# Patient Record
Sex: FEMALE | Race: WHITE | NOT HISPANIC OR LATINO | Employment: FULL TIME | URBAN - NONMETROPOLITAN AREA
[De-identification: names, ages, dates, MRNs, and addresses within clinical notes are randomized per-mention and may not be internally consistent; named-entity substitution may affect disease eponyms.]

---
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
## 2019-11-21 ENCOUNTER — APPOINTMENT (OUTPATIENT)
Dept: URGENT CARE | Age: 19
End: 2019-11-21

## 2019-11-21 ENCOUNTER — APPOINTMENT (OUTPATIENT)
Dept: LAB | Age: 19
End: 2019-11-21

## 2019-11-21 ENCOUNTER — TRANSCRIBE ORDERS (OUTPATIENT)
Dept: URGENT CARE | Age: 19
End: 2019-11-21

## 2019-11-21 DIAGNOSIS — Z02.1 PRE-EMPLOYMENT EXAMINATION: ICD-10-CM

## 2019-11-21 DIAGNOSIS — Z02.1 PRE-EMPLOYMENT EXAMINATION: Primary | ICD-10-CM

## 2019-11-21 LAB — RUBV IGG SERPL IA-ACNC: >175 IU/ML

## 2019-11-21 PROCEDURE — 86762 RUBELLA ANTIBODY: CPT

## 2019-11-21 PROCEDURE — 86765 RUBEOLA ANTIBODY: CPT

## 2019-11-21 PROCEDURE — 86480 TB TEST CELL IMMUN MEASURE: CPT

## 2019-11-21 PROCEDURE — 86787 VARICELLA-ZOSTER ANTIBODY: CPT

## 2019-11-21 PROCEDURE — 36415 COLL VENOUS BLD VENIPUNCTURE: CPT

## 2019-11-21 PROCEDURE — 86735 MUMPS ANTIBODY: CPT

## 2019-11-22 LAB
GAMMA INTERFERON BACKGROUND BLD IA-ACNC: 0.02 IU/ML
M TB IFN-G BLD-IMP: NEGATIVE
M TB IFN-G CD4+ BCKGRND COR BLD-ACNC: 0 IU/ML
M TB IFN-G CD4+ BCKGRND COR BLD-ACNC: 0 IU/ML
MITOGEN IGNF BCKGRD COR BLD-ACNC: >10 IU/ML

## 2019-11-25 LAB — VZV IGG SER IA-ACNC: NORMAL

## 2019-11-26 LAB
MEV IGG SER QL: NORMAL
MUV IGG SER QL: NORMAL

## 2020-02-18 ENCOUNTER — TELEPHONE (OUTPATIENT)
Dept: OTHER | Facility: OTHER | Age: 20
End: 2020-02-18

## 2020-03-04 ENCOUNTER — OFFICE VISIT (OUTPATIENT)
Dept: URGENT CARE | Facility: CLINIC | Age: 20
End: 2020-03-04
Payer: COMMERCIAL

## 2020-03-04 VITALS
TEMPERATURE: 98.6 F | OXYGEN SATURATION: 97 % | RESPIRATION RATE: 18 BRPM | SYSTOLIC BLOOD PRESSURE: 110 MMHG | HEART RATE: 84 BPM | DIASTOLIC BLOOD PRESSURE: 64 MMHG

## 2020-03-04 DIAGNOSIS — A09 GASTROENTERITIS, INFECTIOUS: Primary | ICD-10-CM

## 2020-03-04 PROCEDURE — G0382 LEV 3 HOSP TYPE B ED VISIT: HCPCS | Performed by: PHYSICIAN ASSISTANT

## 2020-03-04 RX ORDER — ONDANSETRON 4 MG/1
4 TABLET, ORALLY DISINTEGRATING ORAL EVERY 6 HOURS PRN
Qty: 20 TABLET | Refills: 0 | Status: SHIPPED | OUTPATIENT
Start: 2020-03-04 | End: 2020-04-18 | Stop reason: ALTCHOICE

## 2020-03-04 NOTE — LETTER
March 4, 2020     Patient: Linda Edmondson   YOB: 2000   Date of Visit: 3/4/2020       To Whom It May Concern:    Above patient seen in office today for acute medical ailment  May  attempt return to work in next 1-3 days as tolerated       Sincerely,        Lin St PA-C    CC: No Recipients

## 2020-03-04 NOTE — PATIENT INSTRUCTIONS
1  Clear liquids for 12-24 hours     2  Then may advance to bland diet (ie  BRAT - bananas, applesauce, toast) as tolerated  3  Recommend avoiding any milk products, spicy, greasy foods and citrus products over the next 1-2 weeks  Advance diet as tolerated  4  Transmission precautions advised  5  If symptoms are not resolving over the next 2-3 days, seek further evaluation by your PCP  6  If you symptoms worsen and you are unable to keep any food or liquid down or develop significant abdominal pain, proceed to ER for further evaluation and treatment

## 2020-03-04 NOTE — PROGRESS NOTES
[facility]    NAME: Ladi Huang is a 22 y o  female  : 2000    MRN: [id number]  DATE: 2020  TIME: 1:53 PM    Assessment and Plan   Gastroenteritis, infectious [A09]  1  Gastroenteritis, infectious  ondansetron (ZOFRAN-ODT) 4 mg disintegrating tablet     Work note given  Patient Instructions   Patient Instructions   1  Clear liquids for 12-24 hours     2  Then may advance to bland diet (ie  BRAT - bananas, applesauce, toast) as tolerated  3  Recommend avoiding any milk products, spicy, greasy foods and citrus products over the next 1-2 weeks  Advance diet as tolerated  4  Transmission precautions advised  5  If symptoms are not resolving over the next 2-3 days, seek further evaluation by your PCP  6  If you symptoms worsen and you are unable to keep any food or liquid down or develop significant abdominal pain, proceed to ER for further evaluation and treatment  Chief Complaint     Chief Complaint   Patient presents with    Vomiting     Pt states since  fever, n/v/d and body aches  History of Present Illness   Ladi Huang presents to the clinic c/o  47-year-old female here with vomiting  Started feeling tired and achy on Saturday night  Then on  morning started with the vomiting, diarrhea and nausea with increased belching and some epigastric discomfort  She has also felt feverish  Last emesis was early yesterday  She has been able to keep some liquids and food in  She says she is very sensitive to holders and does have decreased appetite  T-max 100 2°  No recent travel  Works at another 1 of the [employer] offices  No recent antibiotics  Tried an over-the-counter anti nausea medicine without much relief  She was sent home from work on  and was off on Monday  She tried to go back in yesterday and remained most of the day but did lead a few minutes earlier than normal due to acute illness    She does work tomorrow and Friday and thinks that she can go back  Review of Systems   Review of Systems   Constitutional: Positive for activity change, appetite change, fatigue and fever  Respiratory: Negative  Cardiovascular: Negative  Gastrointestinal: Positive for diarrhea, nausea and vomiting  Negative for abdominal distention, abdominal pain, anal bleeding, blood in stool, constipation and rectal pain  Gas, belching  Musculoskeletal: Positive for myalgias  Skin: Negative for rash  Hematological: Negative  Current Medications     Long-Term Medications   Medication Sig Dispense Refill    ondansetron (ZOFRAN-ODT) 4 mg disintegrating tablet Take 1 tablet (4 mg total) by mouth every 6 (six) hours as needed for nausea or vomiting 20 tablet 0       Current Allergies     Allergies as of 03/04/2020 - Reviewed 03/04/2020   Allergen Reaction Noted    Benadryl [diphenhydramine] Hives 04/19/2016          The following portions of the patient's history were reviewed and updated as appropriate: allergies, current medications, past family history, past medical history, past social history, past surgical history and problem list   Past Medical History:   Diagnosis Date    Psychiatric disorder     depression     Past Surgical History:   Procedure Laterality Date    SKIN GRAFT      SKIN GRAFT      left elbow     No family history on file  Objective   /64   Pulse 84   Temp 98 6 °F (37 °C) (Tympanic Core)   Resp 18   LMP 03/01/2020   SpO2 97%   Patient's last menstrual period was 03/01/2020  Physical Exam     Physical Exam   Constitutional: She is oriented to person, place, and time  She appears well-developed and well-nourished  No distress  HENT:   Head: Normocephalic and atraumatic  Mouth/Throat: Oropharynx is clear and moist  No oropharyngeal exudate  Eyes: Pupils are equal, round, and reactive to light  Conjunctivae and EOM are normal  No scleral icterus     Neck: Normal range of motion  Neck supple  Cardiovascular: Normal rate, regular rhythm and normal heart sounds  Exam reveals no gallop and no friction rub  No murmur heard  Pulmonary/Chest: Effort normal and breath sounds normal  No stridor  No respiratory distress  She has no wheezes  She has no rales  Abdominal: Soft  She exhibits no distension and no mass  There is no tenderness  There is no guarding  Neurological: She is alert and oriented to person, place, and time  Skin: Skin is warm and dry  No rash noted  She is not diaphoretic  Good turgor   Psychiatric: She has a normal mood and affect  Nursing note and vitals reviewed

## 2020-03-09 ENCOUNTER — OFFICE VISIT (OUTPATIENT)
Dept: OBGYN CLINIC | Facility: MEDICAL CENTER | Age: 20
End: 2020-03-09
Payer: COMMERCIAL

## 2020-03-09 VITALS
HEIGHT: 66 IN | DIASTOLIC BLOOD PRESSURE: 70 MMHG | BODY MASS INDEX: 26.84 KG/M2 | SYSTOLIC BLOOD PRESSURE: 110 MMHG | WEIGHT: 167 LBS

## 2020-03-09 DIAGNOSIS — Z30.09 ENCOUNTER FOR COUNSELING REGARDING CONTRACEPTION: Primary | ICD-10-CM

## 2020-03-09 PROCEDURE — 99203 OFFICE O/P NEW LOW 30 MIN: CPT | Performed by: OBSTETRICS & GYNECOLOGY

## 2020-03-10 ENCOUNTER — TELEPHONE (OUTPATIENT)
Dept: OBGYN CLINIC | Facility: MEDICAL CENTER | Age: 20
End: 2020-03-10

## 2020-03-10 NOTE — TELEPHONE ENCOUNTER
Per Helga Sky at [employer] Information is active  Nexplanon insertion, removal and device will be covered at 100%  No precert required  No patient responsibility  Reference # [id number]  Pt aware and appt scheduled  ----- Message from Dotty Dinh MD sent at 3/9/2020 11:12 AM EDT -----  Regarding: schedule nexplanon  Please check patient's insurance and schedule with me for Nexplanon placement

## 2020-03-21 ENCOUNTER — AMB VIDEO VISIT (OUTPATIENT)
Dept: URGENT CARE | Facility: CLINIC | Age: 20
End: 2020-03-21

## 2020-03-21 ENCOUNTER — OFFICE VISIT (OUTPATIENT)
Dept: URGENT CARE | Facility: CLINIC | Age: 20
End: 2020-03-21
Payer: COMMERCIAL

## 2020-03-21 VITALS — OXYGEN SATURATION: 95 % | TEMPERATURE: 99.7 F | RESPIRATION RATE: 16 BRPM | HEART RATE: 70 BPM

## 2020-03-21 DIAGNOSIS — R05.9 COUGH: Primary | ICD-10-CM

## 2020-03-21 DIAGNOSIS — R50.81 FEVER IN OTHER DISEASES: Primary | ICD-10-CM

## 2020-03-21 PROCEDURE — G0382 LEV 3 HOSP TYPE B ED VISIT: HCPCS | Performed by: NURSE PRACTITIONER

## 2020-03-21 PROCEDURE — EVISIT: Performed by: PHYSICIAN ASSISTANT

## 2020-03-21 NOTE — PATIENT INSTRUCTIONS
[address]    Your healthcare provider and/or public health staff have evaluated you and have determined that you do not need to remain in the hospital at this time  At this time you can be isolated at home where you will be monitored by staff from your local or state health department  You should carefully follow the prevention and isolation steps below until a healthcare provider or local or state health department says that you can return to your normal activities  Stay home except to get medical care    People who are mildly ill with COVID-19 are able to isolate at home during their illness  You should restrict activities outside your home, except for getting medical care  Do not go to work, school, or public areas  Avoid using public transportation, ride-sharing, or taxis  Separate yourself from other people and animals in your home    People: As much as possible, you should stay in a specific room and away from other people in your home  Also, you should use a separate bathroom, if available  Animals: You should restrict contact with pets and other animals while you are sick with COVID-19, just like you would around other people  Although there have not been reports of pets or other animals becoming sick with COVID-19, it is still recommended that people sick with COVID-19 limit contact with animals until more information is known about the virus  When possible, have another member of your household care for your animals while you are sick  If you are sick with COVID-19, avoid contact with your pet, including petting, snuggling, being kissed or licked, and sharing food  If you must care for your pet or be around animals while you are sick, wash your hands before and after you interact with pets and wear a facemask  See COVID-19 and Animals for more information      Call ahead before visiting your doctor    If you have a medical appointment, call the healthcare provider and tell them that you have or may have COVID-19  This will help the healthcare providers office take steps to keep other people from getting infected or exposed  Wear a facemask    You should wear a facemask when you are around other people (e g , sharing a room or vehicle) or pets and before you enter a healthcare providers office  If you are not able to wear a facemask (for example, because it causes trouble breathing), then people who live with you should not stay in the same room with you, or they should wear a facemask if they enter your room  Cover your coughs and sneezes    Cover your mouth and nose with a tissue when you cough or sneeze  Throw used tissues in a lined trash can  Immediately wash your hands with soap and water for at least 20 seconds or, if soap and water are not available, clean your hands with an alcohol-based hand  that contains at least 60% alcohol  Clean your hands often    Wash your hands often with soap and water for at least 20 seconds, especially after blowing your nose, coughing, or sneezing; going to the bathroom; and before eating or preparing food  If soap and water are not readily available, use an alcohol-based hand  with at least 60% alcohol, covering all surfaces of your hands and rubbing them together until they feel dry  Soap and water are the best option if hands are visibly dirty  Avoid touching your eyes, nose, and mouth with unwashed hands  Avoid sharing personal household items    You should not share dishes, drinking glasses, cups, eating utensils, towels, or bedding with other people or pets in your home  After using these items, they should be washed thoroughly with soap and water  Clean all high-touch surfaces everyday    High touch surfaces include counters, tabletops, doorknobs, bathroom fixtures, toilets, phones, keyboards, tablets, and bedside tables  Also, clean any surfaces that may have blood, stool, or body fluids on them   Use a household cleaning spray or wipe, according to the label instructions  Labels contain instructions for safe and effective use of the cleaning product including precautions you should take when applying the product, such as wearing gloves and making sure you have good ventilation during use of the product  Monitor your symptoms    Seek prompt medical attention if your illness is worsening (e g , difficulty breathing)  Before seeking care, call your healthcare provider and tell them that you have, or are being evaluated for, COVID-19  Put on a facemask before you enter the facility  These steps will help the healthcare providers office to keep other people in the office or waiting room from getting infected or exposed  Ask your healthcare provider to call the local or state health department  Persons who are placed under active monitoring or facilitated self-monitoring should follow instructions provided by their local health department or occupational health professionals, as appropriate  If you have a medical emergency and need to call 911, notify the dispatch personnel that you have, or are being evaluated for COVID-19  If possible, put on a facemask before emergency medical services arrive  Discontinuing home isolation    Patients with confirmed COVID-19 should remain under home isolation precautions until the risk of secondary transmission to others is thought to be low  The decision to discontinue home isolation precautions should be made on a case-by-case basis, in consultation with healthcare providers and state and local health departments      Source: [employer]

## 2020-03-21 NOTE — CARE ANYWHERE EVISITS
Visit Summary for Whitney Cogan - Gender: Female - Date of Birth: [birth year]  Date: [id number] - Duration: 5 minutes  Patient: Whitney Cogan  Provider: Osmar Locke PA-C    Patient Contact Information  Address  [address] [place] [address]; [address] [place] [address]  [phone]    Visit Topics  cough + fever: other [Added By: Self - 2020-03-21]    Triage Questions   Have you had any international travel in the last 14 days? Answer [no]  Have you had any exposure to a known or expected Covid-19 patient in the last 14 days? Answer [yes]  Do you have any immune system compromise or chronic lung disease? Answer [no]  Do you have any vulnerable family members in the home (infant, pregnant, cancer, elderly)? Answer Albino Morales, 9 month old son]     Conversation Transcripts  [0A][0A] [Notification] You are connected with Osmar Locke PA-C, Urgent Care Specialist [0A][Notification] Whitney Cogan is located in [place]  [0A][Notification] Whitney Cogan has shared health history  Quincy Mayen  [0A]    Diagnosis    Procedures  Value: [id number] Code: CPT-4 UNLISTED E&M SERVICE    Medications Prescribed    No prescriptions ordered    Electronically signed by: Sunny Pierce(NPI [id number])

## 2020-03-21 NOTE — PROGRESS NOTES
Video Visit - Cruzito Artis 22 y o  female MRN: [id number]    REQUIRED DOCUMENTATION:       There were no vitals filed for this visit  1  This service was provided via [facility]  2  Provider located at [place] [address]  [address] [place] [address] [address] [facility]  [phone]  3  [facility] provider: Ana M Rod PA-C   4  Identify all parties in room with patient during [facility] visit:  [facility] office door was closed  5  After connecting through [employer], patient was identified by name and date of birth  Patient was then informed that this was a Telemedicine visit and that the exam was being conducted confidentially over secure lines  No one else was in the room  Patient acknowledged consent and understanding of privacy and security of the Telemedicine visit  I informed the patient that I have reviewed their record in Epic and presented the opportunity for them to ask any questions regarding the visit today  The patient agreed to participate  68-year-old female use the video visit system to complain of cough chest tightness shortness of breath fever 100 1 for last 2 days  She works in healthcare  No known exposures or travel  No nausea vomiting  She is taking Tylenol cold and flu over-the-counter  She denies a history of asthma or medical problems  She is not diabetic  No kidney disease  Physical Exam   Constitutional: She is oriented to person, place, and time  She appears well-developed and well-nourished  No distress  Pulmonary/Chest: Effort normal      Dry cough during the visit   Neurological: She is alert and oriented to person, place, and time  Diagnoses and all orders for this visit:    Cough      Patient Instructions    Due to her cough fever shortness of breath and recent exposure I recommend follow-up with the care now for evaluation and possible coated testing  I called and spoke with the provider in the care now setting    Because the patient is also an employee they will contact in [employer]  Follow up with PCP if not improved, if symptoms are worse, go to the ER

## 2020-03-21 NOTE — PROGRESS NOTES
[facility] Now        NAME: Maria Eugenia Phelps is a 22 y o  female  : 2000    MRN: [id number]  DATE: 2020  TIME: 1:32 PM    Assessment and Plan   Fever in other diseases [R50 81]  1  Fever in other diseases  MISC COVID-19 TEST- Office Collection     Covid/rp2 testing completed  Self quarantine instructions given until results back - further information to be provided at that time  Pt in agreement with plan of care    Patient Instructions     Follow up with PCP in 3-5 days  Proceed to  ER if symptoms worsen  Chief Complaint     Chief Complaint   Patient presents with    Cough         History of Present Illness   Maria Eugenia Phelps presents to the clinic c/o    Pt did evisit - was told to present to office for COVID/flu testing  H/o low grade temp, body aches, cough  Works in urgent care  Unknown any direct contact/exposures   Has a small child at home  Did have flu vaccine  Review of Systems   Review of Systems   All other systems reviewed and are negative  Current Medications     Long-Term Medications   Medication Sig Dispense Refill    ondansetron (ZOFRAN-ODT) 4 mg disintegrating tablet Take 1 tablet (4 mg total) by mouth every 6 (six) hours as needed for nausea or vomiting (Patient not taking: Reported on 3/9/2020) 20 tablet 0       Current Allergies     Allergies as of 2020 - Reviewed 2020   Allergen Reaction Noted    Benadryl [diphenhydramine] Hives 2016            The following portions of the patient's history were reviewed and updated as appropriate: allergies, current medications, past family history, past medical history, past social history, past surgical history and problem list     Objective   Pulse 70   Temp 99 7 °F (37 6 °C)   Resp 16   LMP 2020   SpO2 95%        Physical Exam     Physical Exam   Constitutional: She is oriented to person, place, and time  Vital signs are normal  She appears well-developed and well-nourished  She is active and cooperative  She appears ill  HENT:   Head: Normocephalic and atraumatic  Eyes: Conjunctivae and lids are normal    Cardiovascular: Normal rate, regular rhythm, S1 normal, S2 normal and normal heart sounds  Pulmonary/Chest: Effort normal and breath sounds normal    Neurological: She is alert and oriented to person, place, and time  Skin: Skin is warm and dry  Psychiatric: She has a normal mood and affect  Her speech is normal and behavior is normal  Judgment and thought content normal  Cognition and memory are normal    Nursing note and vitals reviewed

## 2020-03-21 NOTE — PATIENT INSTRUCTIONS
Due to her cough fever shortness of breath and recent exposure I recommend follow-up with the care now for evaluation and possible coated testing  I called and spoke with the provider in the care now setting  Because the patient is also an employee they will contact in [employer]

## 2020-03-22 ENCOUNTER — TELEPHONE (OUTPATIENT)
Dept: URGENT CARE | Facility: CLINIC | Age: 20
End: 2020-03-22

## 2020-03-22 ENCOUNTER — DOCUMENTATION (OUTPATIENT)
Dept: URGENT CARE | Facility: CLINIC | Age: 20
End: 2020-03-22

## 2020-03-22 DIAGNOSIS — R68.89 FLU-LIKE SYMPTOMS: Primary | ICD-10-CM

## 2020-03-22 PROCEDURE — 87631 RESP VIRUS 3-5 TARGETS: CPT | Performed by: NURSE PRACTITIONER

## 2020-03-22 NOTE — TELEPHONE ENCOUNTER
It came into providers inbox [facility] CR) that specimen order and resulting provider were changed after being received at the lab  Lab was notified, states the specimen was already sent so order cannot be modified but resulting provider have been changed back to [facility]

## 2020-03-22 NOTE — PROGRESS NOTES
Curbside flu test completed only as Covid collected yesterday had order changed by lab omitting the flu test   Lab unable to add flu test - collection repeated    Pt remains clinically stable with no signs of distress

## 2020-03-22 NOTE — TELEPHONE ENCOUNTER
Spoke to pt - feeling same as yesterday  Reviewed with pt at length instructions on self quarantine  Son has appt tomorrow - pt is aware not to go with him to visit  Also noted - COVID lab order entered as Covid 19 and Rp2 test   Lab clinician cancelled and reordered only covid and under a different ordering provider  See phone note to lab on further details  Pt to rtn to office today for collection of flu test  Instructions given for her to call when she arrives

## 2020-03-23 ENCOUNTER — TRANSCRIBE ORDERS (OUTPATIENT)
Dept: LAB | Facility: HOSPITAL | Age: 20
End: 2020-03-23

## 2020-03-23 LAB
FLUAV RNA NPH QL NAA+PROBE: NORMAL
FLUBV RNA NPH QL NAA+PROBE: NORMAL
RSV RNA NPH QL NAA+PROBE: NORMAL

## 2020-03-28 ENCOUNTER — TELEPHONE (OUTPATIENT)
Dept: URGENT CARE | Age: 20
End: 2020-03-28

## 2020-03-28 LAB — SARS-COV-2 RNA SPEC QL NAA+PROBE: NOT DETECTED

## 2020-03-28 NOTE — LETTER
March 28, 2020     Patient: Batsheva Triana   YOB: 2000   Date of Visit: 3/28/2020       To Whom it May Concern:    Viv Garcia was seen in my clinic on 3/28/2020  She may return to work on 03/28/2020  If you have any questions or concerns, please don't hesitate to call  Sincerely,          Irina Alaniz PA-C        CC: No Recipients

## 2020-03-28 NOTE — TELEPHONE ENCOUNTER
Spoke with patient about all negative corona virus test results  Patient understood  Denies any fevers or chills  Feeling much better  Told patient did not have to quarantine any further  Continue to practice social distancing good hand hygiene and self-monitoring    Follow-up with PCP as needed

## 2020-03-29 NOTE — PROGRESS NOTES
Order(s) created erroneously   Erroneous order ID: [id number]   Order moved by: David Lou   Order move date/time: 03/29/2020 10:09 AM   Source Patient: [id number]   Source Contact: 03/21/2020   Destination Patient: [id number]   Destination Contact: 03/21/2020

## 2020-04-23 ENCOUNTER — PROCEDURE VISIT (OUTPATIENT)
Dept: OBGYN CLINIC | Facility: MEDICAL CENTER | Age: 20
End: 2020-04-23
Payer: COMMERCIAL

## 2020-04-23 VITALS — WEIGHT: 163.7 LBS | BODY MASS INDEX: 26.42 KG/M2 | DIASTOLIC BLOOD PRESSURE: 70 MMHG | SYSTOLIC BLOOD PRESSURE: 120 MMHG

## 2020-04-23 DIAGNOSIS — Z30.017 INSERTION OF NEXPLANON: Primary | ICD-10-CM

## 2020-04-23 LAB — SL AMB POCT URINE HCG: NEGATIVE

## 2020-04-23 PROCEDURE — 81025 URINE PREGNANCY TEST: CPT | Performed by: OBSTETRICS & GYNECOLOGY

## 2020-04-23 PROCEDURE — 11981 INSERTION DRUG DLVR IMPLANT: CPT | Performed by: OBSTETRICS & GYNECOLOGY

## 2020-06-02 ENCOUNTER — TELEPHONE (OUTPATIENT)
Dept: OBGYN CLINIC | Facility: MEDICAL CENTER | Age: 20
End: 2020-06-02

## 2020-06-03 ENCOUNTER — TELEPHONE (OUTPATIENT)
Dept: OBGYN CLINIC | Facility: MEDICAL CENTER | Age: 20
End: 2020-06-03

## 2020-06-30 ENCOUNTER — OFFICE VISIT (OUTPATIENT)
Dept: OBGYN CLINIC | Facility: MEDICAL CENTER | Age: 20
End: 2020-06-30
Payer: COMMERCIAL

## 2020-06-30 VITALS
HEIGHT: 66 IN | DIASTOLIC BLOOD PRESSURE: 80 MMHG | BODY MASS INDEX: 25.23 KG/M2 | WEIGHT: 157 LBS | SYSTOLIC BLOOD PRESSURE: 120 MMHG

## 2020-06-30 DIAGNOSIS — Z97.5 NEXPLANON IN PLACE: ICD-10-CM

## 2020-06-30 DIAGNOSIS — M79.602 PAIN OF LEFT UPPER EXTREMITY: Primary | ICD-10-CM

## 2020-06-30 PROCEDURE — 3008F BODY MASS INDEX DOCD: CPT | Performed by: OBSTETRICS & GYNECOLOGY

## 2020-06-30 PROCEDURE — 99214 OFFICE O/P EST MOD 30 MIN: CPT | Performed by: OBSTETRICS & GYNECOLOGY

## 2020-06-30 PROCEDURE — 1036F TOBACCO NON-USER: CPT | Performed by: OBSTETRICS & GYNECOLOGY

## 2020-06-30 RX ORDER — LIDOCAINE AND PRILOCAINE 25; 25 MG/G; MG/G
CREAM TOPICAL AS NEEDED
Qty: 30 G | Refills: 0 | Status: SHIPPED | OUTPATIENT
Start: 2020-06-30 | End: 2021-01-04 | Stop reason: ALTCHOICE

## 2020-07-27 ENCOUNTER — TELEPHONE (OUTPATIENT)
Dept: FAMILY MEDICINE CLINIC | Facility: CLINIC | Age: 20
End: 2020-07-27

## 2020-10-28 ENCOUNTER — HOSPITAL ENCOUNTER (EMERGENCY)
Facility: HOSPITAL | Age: 20
Discharge: HOME | End: 2020-10-28
Attending: EMERGENCY MEDICINE | Admitting: EMERGENCY MEDICINE
Payer: COMMERCIAL

## 2020-10-28 VITALS
SYSTOLIC BLOOD PRESSURE: 142 MMHG | RESPIRATION RATE: 18 BRPM | WEIGHT: 157.63 LBS | DIASTOLIC BLOOD PRESSURE: 70 MMHG | BODY MASS INDEX: 25.44 KG/M2 | OXYGEN SATURATION: 100 % | TEMPERATURE: 98.5 F | HEART RATE: 84 BPM

## 2020-10-28 DIAGNOSIS — R07.89 CHEST WALL PAIN: ICD-10-CM

## 2020-10-28 DIAGNOSIS — K29.70 GASTRITIS: Primary | ICD-10-CM

## 2020-10-28 LAB
EXT PREG TEST URINE: NEGATIVE
EXT. CONTROL ED NAV: NORMAL

## 2020-10-28 PROCEDURE — 81025 URINE PREGNANCY TEST: CPT | Performed by: NURSE PRACTITIONER

## 2020-10-28 PROCEDURE — 93005 ELECTROCARDIOGRAM TRACING: CPT

## 2020-10-28 PROCEDURE — 99285 EMERGENCY DEPT VISIT HI MDM: CPT | Performed by: EMERGENCY MEDICINE

## 2020-10-28 PROCEDURE — 99285 EMERGENCY DEPT VISIT HI MDM: CPT

## 2020-10-28 RX ORDER — LIDOCAINE HYDROCHLORIDE 20 MG/ML
15 SOLUTION OROPHARYNGEAL ONCE
Status: COMPLETED | OUTPATIENT
Start: 2020-10-28 | End: 2020-10-28

## 2020-10-28 RX ORDER — FAMOTIDINE 20 MG/1
20 TABLET, FILM COATED ORAL ONCE
Status: COMPLETED | OUTPATIENT
Start: 2020-10-28 | End: 2020-10-28

## 2020-10-28 RX ORDER — MAGNESIUM HYDROXIDE/ALUMINUM HYDROXICE/SIMETHICONE 120; 1200; 1200 MG/30ML; MG/30ML; MG/30ML
30 SUSPENSION ORAL ONCE
Status: COMPLETED | OUTPATIENT
Start: 2020-10-28 | End: 2020-10-28

## 2020-10-28 RX ORDER — FAMOTIDINE 20 MG/1
20 TABLET, FILM COATED ORAL 2 TIMES DAILY
Qty: 60 TABLET | Refills: 0 | Status: SHIPPED | OUTPATIENT
Start: 2020-10-28 | End: 2022-01-14

## 2020-10-28 RX ADMIN — ALUMINUM HYDROXIDE, MAGNESIUM HYDROXIDE, AND SIMETHICONE 30 ML: 200; 200; 20 SUSPENSION ORAL at 20:20

## 2020-10-28 RX ADMIN — FAMOTIDINE 20 MG: 20 TABLET, FILM COATED ORAL at 21:06

## 2020-10-28 RX ADMIN — LIDOCAINE HYDROCHLORIDE 15 ML: 20 SOLUTION ORAL; TOPICAL at 20:20

## 2020-10-29 LAB
ATRIAL RATE: 67 BPM
P AXIS: 45 DEGREES
PR INTERVAL: 122 MS
QRS AXIS: 90 DEGREES
QRSD INTERVAL: 96 MS
QT INTERVAL: 392 MS
QTC INTERVAL: 414 MS
T WAVE AXIS: 62 DEGREES
VENTRICULAR RATE: 67 BPM

## 2020-10-29 PROCEDURE — 93010 ELECTROCARDIOGRAM REPORT: CPT | Performed by: INTERNAL MEDICINE

## 2021-01-04 ENCOUNTER — OFFICE VISIT (OUTPATIENT)
Dept: INTERNAL MEDICINE CLINIC | Facility: CLINIC | Age: 21
End: 2021-01-04
Payer: COMMERCIAL

## 2021-01-04 VITALS
DIASTOLIC BLOOD PRESSURE: 62 MMHG | WEIGHT: 156.4 LBS | SYSTOLIC BLOOD PRESSURE: 132 MMHG | BODY MASS INDEX: 25.13 KG/M2 | TEMPERATURE: 97.7 F | HEIGHT: 66 IN | RESPIRATION RATE: 18 BRPM | OXYGEN SATURATION: 99 % | HEART RATE: 92 BPM

## 2021-01-04 DIAGNOSIS — Z00.00 PHYSICAL EXAM, ANNUAL: Primary | ICD-10-CM

## 2021-01-04 PROCEDURE — 99385 PREV VISIT NEW AGE 18-39: CPT | Performed by: INTERNAL MEDICINE

## 2021-01-04 NOTE — PROGRESS NOTES
Assessment/Plan     Healthy female exam      1  Occasional acid reflux, okay to continue Pepcid as needed  Screening blood work was recommended given family history of diabetes  Advised to continue healthy  Eating habits and exercise  2  Patient Counseling:  --Nutrition: Stressed importance of moderation in sodium/caffeine intake, saturated fat and cholesterol, caloric balance, sufficient intake of fresh fruits, vegetables, fiber, calcium, iron, and 1 mg of folate supplement per day (for females capable of pregnancy)  --Exercise: Stressed the importance of regular exercise  --Immunizations reviewed and updated  Up-to-date on tetanus and influenza vaccination  Advised to check if she completed HPV  Her records indicate only 1 vaccine given  If she has not gotten 2 doses before the age of 11 then she should get total of 3 doses  --Metabolic screening was reviewed and updated  --Cancer screening was reviewed and updated    3  Discussed the patient's BMI with her  The BMI is above average; BMI management plan is completed  4  Follow up in one year  Terese Snowden is a 23 y o  female and is here for a comprehensive physical exam  The patient reports no problems  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    The following portions of the patient's history were reviewed and updated as appropriate: current medications, past medical history, past social history and past surgical history  Review of Systems  Review of Systems   Constitutional: Negative for fatigue, fever and unexpected weight change  HENT: Negative for ear pain, hearing loss and sore throat  Eyes: Negative for pain and discharge  Respiratory: Negative for cough, chest tightness and shortness of breath  Cardiovascular: Negative for chest pain and palpitations  Gastrointestinal: Negative for abdominal pain, blood in stool, constipation, diarrhea and nausea  Genitourinary: Negative for dysuria, frequency and hematuria  Musculoskeletal: Negative for arthralgias and joint swelling  Skin: Negative for rash  Allergic/Immunologic: Negative for immunocompromised state  Neurological: Negative for dizziness and headaches  Hematological: Negative for adenopathy  Psychiatric/Behavioral: Negative for confusion and sleep disturbance  /62 (BP Location: Right arm, Patient Position: Sitting, Cuff Size: Standard)   Pulse 92   Temp 97 7 °F (36 5 °C)   Resp 18   Ht 5' 6" (1 676 m)   Wt 70 9 kg (156 lb 6 4 oz)   SpO2 99%   BMI 25 24 kg/m²      Physical Exam  Vitals signs and nursing note reviewed  Constitutional:       Appearance: Normal appearance  She is well-developed  HENT:      Head: Normocephalic and atraumatic  Right Ear: Tympanic membrane normal       Left Ear: Tympanic membrane normal       Nose: Nose normal    Eyes:      General:         Right eye: No discharge  Left eye: No discharge  Conjunctiva/sclera: Conjunctivae normal       Pupils: Pupils are equal, round, and reactive to light  Neck:      Musculoskeletal: Normal range of motion and neck supple  Thyroid: No thyromegaly  Cardiovascular:      Rate and Rhythm: Normal rate and regular rhythm  Chest Wall: PMI is not displaced  Heart sounds: Normal heart sounds, S1 normal and S2 normal  No murmur  Pulmonary:      Effort: Pulmonary effort is normal  No accessory muscle usage or respiratory distress  Breath sounds: Normal breath sounds  No rhonchi or rales  Abdominal:      General: Bowel sounds are normal       Palpations: Abdomen is soft  There is no shifting dullness  Tenderness: There is no abdominal tenderness  There is no rebound  Musculoskeletal: Normal range of motion  General: No tenderness  Lymphadenopathy:      Cervical: No cervical adenopathy  Skin:     General: Skin is warm  Findings: No rash  Neurological:      Mental Status: She is alert     Psychiatric:         Speech: Speech normal          BMI Counseling: Body mass index is 25 24 kg/m²  The BMI is above normal  Nutrition recommendations include encouraging healthy choices of fruits and vegetables  Exercise recommendations include exercising 3-5 times per week

## 2021-01-07 ENCOUNTER — OFFICE VISIT (OUTPATIENT)
Dept: URGENT CARE | Facility: MEDICAL CENTER | Age: 21
End: 2021-01-07
Payer: COMMERCIAL

## 2021-01-07 VITALS
RESPIRATION RATE: 18 BRPM | BODY MASS INDEX: 23.49 KG/M2 | WEIGHT: 155 LBS | HEIGHT: 68 IN | HEART RATE: 74 BPM | SYSTOLIC BLOOD PRESSURE: 125 MMHG | TEMPERATURE: 98.9 F | DIASTOLIC BLOOD PRESSURE: 72 MMHG | OXYGEN SATURATION: 99 %

## 2021-01-07 DIAGNOSIS — S83.412A SPRAIN OF MEDIAL COLLATERAL LIGAMENT OF LEFT KNEE, INITIAL ENCOUNTER: Primary | ICD-10-CM

## 2021-01-07 PROCEDURE — G0382 LEV 3 HOSP TYPE B ED VISIT: HCPCS | Performed by: PHYSICIAN ASSISTANT

## 2021-01-07 RX ORDER — IBUPROFEN 600 MG/1
600 TABLET ORAL EVERY 6 HOURS PRN
Qty: 30 TABLET | Refills: 0 | Status: SHIPPED | OUTPATIENT
Start: 2021-01-07 | End: 2021-02-05 | Stop reason: ALTCHOICE

## 2021-01-07 NOTE — PROGRESS NOTES
[facility] Now        NAME: Ladi Huang is a 23 y o  female  : 2000    MRN: [id number]  DATE: 2021  TIME: 2:00 PM    /72   Pulse 74   Temp 98 9 °F (37 2 °C) (Tympanic)   Resp 18   Ht 5' 8" (1 727 m)   Wt 70 3 kg (155 lb)   LMP 2020 (Approximate)   SpO2 99%   BMI 23 57 kg/m²     Assessment and Plan   Sprain of medial collateral ligament of left knee, initial encounter [S83 412A]  1  Sprain of medial collateral ligament of left knee, initial encounter  XR knee 4+ vw left injury    Compression bandages    Ambulatory referral to Orthopedic Surgery    ibuprofen (MOTRIN) 600 mg tablet         Patient Instructions       Follow up with PCP in 3-5 days  Proceed to  ER if symptoms worsen  Chief Complaint     Chief Complaint   Patient presents with    Knee Pain     Pt c/o medial left knee pain for the past few days, pain started worsening last night  No injury reported  Pt had previous injury 4yrs ago  pt took Advil for symptoms  History of Present Illness       Pt with left knee pain yesterday onset while walking       Review of Systems   Review of Systems   Constitutional: Negative  HENT: Negative  Eyes: Negative  Respiratory: Negative  Cardiovascular: Negative  Gastrointestinal: Negative  Endocrine: Negative  Genitourinary: Negative  Musculoskeletal: Negative  Skin: Negative  Allergic/Immunologic: Negative  Neurological: Negative  Hematological: Negative  Psychiatric/Behavioral: Negative  All other systems reviewed and are negative          Current Medications       Current Outpatient Medications:     etonogestrel (NEXPLANON) subdermal implant, 68 mg by Subdermal route once, Disp: , Rfl:     famotidine (PEPCID) 20 mg tablet, Take 1 tablet (20 mg total) by mouth 2 (two) times a day, Disp: 60 tablet, Rfl: 0    ibuprofen (MOTRIN) 600 mg tablet, Take 1 tablet (600 mg total) by mouth every 6 (six) hours as needed for mild pain, Disp: 30 tablet, Rfl: 0    Current Allergies     Allergies as of 01/07/2021 - Reviewed 01/07/2021   Allergen Reaction Noted    Benadryl [diphenhydramine] Hives 04/19/2016            The following portions of the patient's history were reviewed and updated as appropriate: allergies, current medications, past family history, past medical history, past social history, past surgical history and problem list      Past Medical History:   Diagnosis Date    Psychiatric disorder     depression       Past Surgical History:   Procedure Laterality Date    SKIN GRAFT      SKIN GRAFT      left elbow       Family History   Problem Relation Age of Onset    Hypothyroidism Mother     Bipolar disorder Mother     Mental illness Mother     No Known Problems Father     Cancer Maternal Grandmother     Heart disease Maternal Grandmother     Breast cancer Maternal Grandmother     Lung cancer Maternal Grandfather          Medications have been verified  Objective   /72   Pulse 74   Temp 98 9 °F (37 2 °C) (Tympanic)   Resp 18   Ht 5' 8" (1 727 m)   Wt 70 3 kg (155 lb)   LMP 12/25/2020 (Approximate)   SpO2 99%   BMI 23 57 kg/m²        Physical Exam     Physical Exam  Vitals signs and nursing note reviewed  Constitutional:       Appearance: Normal appearance  She is normal weight  Comments: Pt declines crutches    HENT:      Head: Normocephalic and atraumatic  Right Ear: Tympanic membrane, ear canal and external ear normal       Left Ear: Tympanic membrane, ear canal and external ear normal       Nose: Nose normal       Mouth/Throat:      Mouth: Mucous membranes are moist       Pharynx: Oropharynx is clear  Eyes:      Extraocular Movements: Extraocular movements intact  Conjunctiva/sclera: Conjunctivae normal       Pupils: Pupils are equal, round, and reactive to light  Neck:      Musculoskeletal: Normal range of motion and neck supple     Cardiovascular:      Rate and Rhythm: Normal rate and regular rhythm  Pulses: Normal pulses  Heart sounds: Normal heart sounds  Pulmonary:      Effort: Pulmonary effort is normal       Breath sounds: Normal breath sounds  Abdominal:      General: Abdomen is flat  Bowel sounds are normal       Palpations: Abdomen is soft  Musculoskeletal: Normal range of motion  Comments: Left knee from  No crepitace no ballotment  No popliteal tenderness  no ant/post drawer minor valgus and varus tenderness no laxity no inverted horseshoe pain  Medial meniscal tenderness  Pain with wt bearing and with full ext of lower leg     Skin:     General: Skin is warm  Capillary Refill: Capillary refill takes less than 2 seconds  Neurological:      General: No focal deficit present  Mental Status: She is alert and oriented to person, place, and time     Psychiatric:         Mood and Affect: Mood normal

## 2021-01-07 NOTE — PATIENT INSTRUCTIONS
Knee Sprain   WHAT YOU NEED TO KNOW:   A knee sprain is a stretched or torn ligament in your knee  Ligaments support the knee and keep the joint and bones in the correct position  A knee sprain may involve one or more ligaments  DISCHARGE INSTRUCTIONS:   Return to the emergency department if:   · Any part of your leg feels cold, numb, or looks pale  Call your doctor if:   · You have new or increased swelling, bruising, or pain in your knee  · Your symptoms do not improve within 6 weeks, even with treatment  · You have questions or concerns about your condition or care  Medicines:   · NSAIDs , such as ibuprofen, help decrease swelling, pain, and fever  This medicine is available with or without a doctor's order  NSAIDs can cause stomach bleeding or kidney problems in certain people  If you take blood thinner medicine, always ask your healthcare provider if NSAIDs are safe for you  Always read the medicine label and follow directions  · Acetaminophen  decreases pain and fever  It is available without a doctor's order  Ask how much to take and how often to take it  Follow directions  Read the labels of all other medicines you are using to see if they also contain acetaminophen, or ask your doctor or pharmacist  Acetaminophen can cause liver damage if not taken correctly  Do not use more than 4 grams (4,000 milligrams) total of acetaminophen in one day  · Prescription pain medicine  may be given  Ask your healthcare provider how to take this medicine safely  Some prescription pain medicines contain acetaminophen  Do not take other medicines that contain acetaminophen without talking to your healthcare provider  Too much acetaminophen may cause liver damage  Prescription pain medicine may cause constipation  Ask your healthcare provider how to prevent or treat constipation  · Take your medicine as directed    Contact your healthcare provider if you think your medicine is not helping or if you have side effects  Tell him or her if you are allergic to any medicine  Keep a list of the medicines, vitamins, and herbs you take  Include the amounts, and when and why you take them  Bring the list or the pill bottles to follow-up visits  Carry your medicine list with you in case of an emergency  A support device  such as a splint or brace may be needed  These devices limit movement and protect the joint while it heals  You may be given crutches to use until you can stand on your injured leg without pain  Physical therapy  may be needed  A physical therapist teaches you exercises to help improve movement and strength, and to decrease pain  Manage a knee sprain:   · Rest  your knee and do not exercise  Do not walk on your injured leg if you are told to keep weight off your knee  Rest helps decrease swelling and allows the injury to heal  You can do gentle range of motion exercises as directed to prevent stiffness  · Apply ice  on your knee for 15 to 20 minutes every hour or as directed  Use an ice pack, or put crushed ice in a plastic bag  Cover the bag with a towel before you apply it  Ice helps prevent tissue damage and decreases swelling and pain  · Apply compression  to your knee as directed  You may need to wear an elastic bandage  This helps keep your injured knee from moving too much while it heals  It should be tight enough to give support but so tight that it causes your toes to feel numb or tingly  Take the bandage off and rewrap it at least 1 time each day  · Elevate your knee  above the level of your heart as often as you can  This will help decrease swelling and pain  Prop your leg on pillows or blankets to keep it elevated comfortably  Do not put pillows directly behind your knee  Prevent another knee sprain:  Exercise your legs to keep your muscles strong  Strong leg muscles help protect your knee and prevent strain   The following may also prevent a knee sprain:  · Slowly start your exercise or training program   Slowly increase the time, distance, and intensity of your exercise  Sudden increases in training may cause another knee sprain  · Wear protective braces and equipment as directed  Braces may prevent your knee from moving the wrong way and causing another sprain  Protective equipment may support your bones and ligaments to prevent injury  · Warm up and stretch before exercise  Warm up by walking or using an exercise bike before starting your regular exercise  Do gentle stretches after warming up  This helps to loosen your muscles and decrease stress on your knee  Cool down and stretch after you exercise  · Wear shoes that fit correctly and support your feet  Replace your running or exercise shoes before the padding or shock absorption is worn out  Ask your healthcare provider which exercise shoes are best for you  Ask if you should wear shoe inserts  Shoe inserts can help support your heels and arches or keep your foot lined up correctly in your shoes  Exercise on flat surfaces  Follow up with your doctor as directed:  Write down your questions so you remember to ask them during your visits  © Copyright [address] Information is for End User's use only and may not be sold, redistributed or otherwise used for commercial purposes  All illustrations and images included in CareNotes® are the copyrighted property of [employer] D [employer]  or [facility] [employer] Salmeron   The above information is an  only  It is not intended as medical advice for individual conditions or treatments  Talk to your doctor, nurse or pharmacist before following any medical regimen to see if it is safe and effective for you

## 2021-01-08 ENCOUNTER — IMMUNIZATIONS (OUTPATIENT)
Dept: FAMILY MEDICINE CLINIC | Facility: HOSPITAL | Age: 21
End: 2021-01-08

## 2021-01-08 DIAGNOSIS — Z23 ENCOUNTER FOR IMMUNIZATION: ICD-10-CM

## 2021-01-08 DIAGNOSIS — B34.9 VIRAL INFECTION: Primary | ICD-10-CM

## 2021-01-08 PROCEDURE — 0001A SARS-COV-2 / COVID-19 MRNA VACCINE (PFIZER-BIONTECH) 30 MCG: CPT

## 2021-01-08 PROCEDURE — 91300 SARS-COV-2 / COVID-19 MRNA VACCINE (PFIZER-BIONTECH) 30 MCG: CPT

## 2021-01-09 DIAGNOSIS — B34.9 VIRAL INFECTION: ICD-10-CM

## 2021-01-09 PROCEDURE — U0003 INFECTIOUS AGENT DETECTION BY NUCLEIC ACID (DNA OR RNA); SEVERE ACUTE RESPIRATORY SYNDROME CORONAVIRUS 2 (SARS-COV-2) (CORONAVIRUS DISEASE [COVID-19]), AMPLIFIED PROBE TECHNIQUE, MAKING USE OF HIGH THROUGHPUT TECHNOLOGIES AS DESCRIBED BY CMS-2020-01-R: HCPCS | Performed by: PHYSICIAN ASSISTANT

## 2021-01-12 LAB — SARS-COV-2 RNA SPEC QL NAA+PROBE: NOT DETECTED

## 2021-01-22 ENCOUNTER — TELEPHONE (OUTPATIENT)
Dept: OBGYN CLINIC | Facility: MEDICAL CENTER | Age: 21
End: 2021-01-22

## 2021-01-22 NOTE — TELEPHONE ENCOUNTER
----- Message from Bahman Huddleston sent at 1/22/2021 11:35 AM EST -----  I contacted pt's insurance  Effective 1/1/20 and active  Pt is covered for removal at 100%  No PA needed    [place] Ref# [id number]   ----- Message -----  From: Vonnie Wiseman  Sent: 1/20/2021  11:24 AM EST  To: Bahman Huddleston    Pt wants nexplanon removal appt scheduled for 2/1 please call for prior auth

## 2021-01-30 ENCOUNTER — IMMUNIZATIONS (OUTPATIENT)
Dept: FAMILY MEDICINE CLINIC | Facility: HOSPITAL | Age: 21
End: 2021-01-30

## 2021-01-30 DIAGNOSIS — Z23 ENCOUNTER FOR IMMUNIZATION: Primary | ICD-10-CM

## 2021-01-30 PROCEDURE — 0002A SARS-COV-2 / COVID-19 MRNA VACCINE (PFIZER-BIONTECH) 30 MCG: CPT

## 2021-01-30 PROCEDURE — 91300 SARS-COV-2 / COVID-19 MRNA VACCINE (PFIZER-BIONTECH) 30 MCG: CPT

## 2021-02-05 ENCOUNTER — PROCEDURE VISIT (OUTPATIENT)
Dept: OBGYN CLINIC | Facility: MEDICAL CENTER | Age: 21
End: 2021-02-05
Payer: COMMERCIAL

## 2021-02-05 VITALS
BODY MASS INDEX: 23.79 KG/M2 | HEIGHT: 68 IN | WEIGHT: 157 LBS | SYSTOLIC BLOOD PRESSURE: 112 MMHG | DIASTOLIC BLOOD PRESSURE: 72 MMHG

## 2021-02-05 DIAGNOSIS — Z30.011 ENCOUNTER FOR INITIAL PRESCRIPTION OF CONTRACEPTIVE PILLS: ICD-10-CM

## 2021-02-05 DIAGNOSIS — Z30.46 NEXPLANON REMOVAL: Primary | ICD-10-CM

## 2021-02-05 PROCEDURE — 11982 REMOVE DRUG IMPLANT DEVICE: CPT | Performed by: OBSTETRICS & GYNECOLOGY

## 2021-02-05 RX ORDER — NORETHINDRONE ACETATE AND ETHINYL ESTRADIOL 1MG-20(21)
1 KIT ORAL DAILY
Qty: 28 TABLET | Refills: 3 | Status: SHIPPED | OUTPATIENT
Start: 2021-02-05 | End: 2021-05-24

## 2021-02-05 NOTE — PROGRESS NOTES
Universal Protocol:  Consent: Verbal consent obtained  Written consent obtained  Risks and benefits: risks, benefits and alternatives were discussed  Consent given by: patient  Patient understanding: patient states understanding of the procedure being performed  Patient consent: the patient's understanding of the procedure matches consent given  Procedure consent: procedure consent matches procedure scheduled  Required items: required blood products, implants, devices, and special equipment available    Remove and insert drug implant    Date/Time: 2/5/2021 9:46 AM  Performed by: Dotty Dinh MD  Authorized by: Dotty Dinh MD     Indication:     Indication: Presence of non-biodegradable drug delivery implant      Indication comment:  Menstrual irregularity, wants to TTC soon  Pre-procedure:     Prepped with: povidone-iodine      Local anesthetic:  Lidocaine with epinephrine    The site was cleaned and prepped in a sterile fashion: yes    Procedure:     Procedure:  Removal    Small stab incision was made in arm: yes (device immediately visible at incision  grasped with hemostat and removed with gentle traction)      Left/right:  Left    Site was closed with steri-strips and pressure bandage applied: yes    Comments:      Patient requesting to start OCPs for now to regulate cycles prior to TTC  Used OCPs prior without difficulty, cannot remember brand  Advised on cyclic vs immediate start, but given frequent menses (q2wks) may still have BTB for first few cycles       F/u 3mo for yearly with pill check

## 2021-05-21 DIAGNOSIS — Z30.011 ENCOUNTER FOR INITIAL PRESCRIPTION OF CONTRACEPTIVE PILLS: ICD-10-CM

## 2021-05-24 RX ORDER — NORETHINDRONE ACETATE AND ETHINYL ESTRADIOL AND FERROUS FUMARATE 1MG-20(21)
KIT ORAL
Qty: 28 TABLET | Refills: 3 | Status: SHIPPED | OUTPATIENT
Start: 2021-05-24 | End: 2022-01-14

## 2021-08-18 ENCOUNTER — APPOINTMENT (OUTPATIENT)
Dept: LAB | Facility: CLINIC | Age: 21
End: 2021-08-18
Payer: COMMERCIAL

## 2021-08-18 DIAGNOSIS — Z00.8 HEALTH EXAMINATION IN POPULATION SURVEYS: ICD-10-CM

## 2021-08-18 LAB
ALBUMIN SERPL BCP-MCNC: 3.9 G/DL (ref 3.5–5)
ALP SERPL-CCNC: 44 U/L (ref 46–116)
ALT SERPL W P-5'-P-CCNC: 22 U/L (ref 12–78)
ANION GAP SERPL CALCULATED.3IONS-SCNC: 4 MMOL/L (ref 4–13)
AST SERPL W P-5'-P-CCNC: 14 U/L (ref 5–45)
BASOPHILS # BLD AUTO: 0.03 THOUSANDS/ΜL (ref 0–0.1)
BASOPHILS NFR BLD AUTO: 1 % (ref 0–1)
BILIRUB SERPL-MCNC: 0.57 MG/DL (ref 0.2–1)
BUN SERPL-MCNC: 11 MG/DL (ref 5–25)
CALCIUM SERPL-MCNC: 8.9 MG/DL (ref 8.3–10.1)
CHLORIDE SERPL-SCNC: 107 MMOL/L (ref 100–108)
CHOLEST SERPL-MCNC: 181 MG/DL (ref 50–200)
CO2 SERPL-SCNC: 26 MMOL/L (ref 21–32)
CREAT SERPL-MCNC: 0.65 MG/DL (ref 0.6–1.3)
EOSINOPHIL # BLD AUTO: 0.04 THOUSAND/ΜL (ref 0–0.61)
EOSINOPHIL NFR BLD AUTO: 1 % (ref 0–6)
ERYTHROCYTE [DISTWIDTH] IN BLOOD BY AUTOMATED COUNT: 12.6 % (ref 11.6–15.1)
EST. AVERAGE GLUCOSE BLD GHB EST-MCNC: 97 MG/DL
GFR SERPL CREATININE-BSD FRML MDRD: 126 ML/MIN/1.73SQ M
GLUCOSE P FAST SERPL-MCNC: 76 MG/DL (ref 65–99)
HBA1C MFR BLD: 5 %
HCT VFR BLD AUTO: 38.6 % (ref 34.8–46.1)
HDLC SERPL-MCNC: 67 MG/DL
HGB BLD-MCNC: 13 G/DL (ref 11.5–15.4)
IMM GRANULOCYTES # BLD AUTO: 0.01 THOUSAND/UL (ref 0–0.2)
IMM GRANULOCYTES NFR BLD AUTO: 0 % (ref 0–2)
LDLC SERPL CALC-MCNC: 103 MG/DL (ref 0–100)
LYMPHOCYTES # BLD AUTO: 1.81 THOUSANDS/ΜL (ref 0.6–4.47)
LYMPHOCYTES NFR BLD AUTO: 39 % (ref 14–44)
MCH RBC QN AUTO: 31 PG (ref 26.8–34.3)
MCHC RBC AUTO-ENTMCNC: 33.7 G/DL (ref 31.4–37.4)
MCV RBC AUTO: 92 FL (ref 82–98)
MONOCYTES # BLD AUTO: 0.37 THOUSAND/ΜL (ref 0.17–1.22)
MONOCYTES NFR BLD AUTO: 8 % (ref 4–12)
NEUTROPHILS # BLD AUTO: 2.44 THOUSANDS/ΜL (ref 1.85–7.62)
NEUTS SEG NFR BLD AUTO: 51 % (ref 43–75)
NRBC BLD AUTO-RTO: 0 /100 WBCS
PLATELET # BLD AUTO: 259 THOUSANDS/UL (ref 149–390)
PMV BLD AUTO: 10 FL (ref 8.9–12.7)
POTASSIUM SERPL-SCNC: 3.6 MMOL/L (ref 3.5–5.3)
PROT SERPL-MCNC: 7.4 G/DL (ref 6.4–8.2)
RBC # BLD AUTO: 4.19 MILLION/UL (ref 3.81–5.12)
SODIUM SERPL-SCNC: 137 MMOL/L (ref 136–145)
TRIGL SERPL-MCNC: 54 MG/DL
TSH SERPL DL<=0.05 MIU/L-ACNC: 1.17 UIU/ML (ref 0.36–3.74)
WBC # BLD AUTO: 4.7 THOUSAND/UL (ref 4.31–10.16)

## 2021-08-18 PROCEDURE — 85025 COMPLETE CBC W/AUTO DIFF WBC: CPT | Performed by: INTERNAL MEDICINE

## 2021-08-18 PROCEDURE — 83036 HEMOGLOBIN GLYCOSYLATED A1C: CPT

## 2021-08-18 PROCEDURE — 80053 COMPREHEN METABOLIC PANEL: CPT | Performed by: INTERNAL MEDICINE

## 2021-08-18 PROCEDURE — 84443 ASSAY THYROID STIM HORMONE: CPT | Performed by: INTERNAL MEDICINE

## 2021-08-18 PROCEDURE — 80061 LIPID PANEL: CPT | Performed by: INTERNAL MEDICINE

## 2021-08-18 PROCEDURE — 36415 COLL VENOUS BLD VENIPUNCTURE: CPT | Performed by: INTERNAL MEDICINE

## 2021-08-20 ENCOUNTER — TELEPHONE (OUTPATIENT)
Dept: INTERNAL MEDICINE CLINIC | Facility: CLINIC | Age: 21
End: 2021-08-20

## 2021-08-20 ENCOUNTER — OFFICE VISIT (OUTPATIENT)
Dept: URGENT CARE | Facility: CLINIC | Age: 21
End: 2021-08-20
Payer: COMMERCIAL

## 2021-08-20 VITALS
HEART RATE: 77 BPM | SYSTOLIC BLOOD PRESSURE: 108 MMHG | BODY MASS INDEX: 23.19 KG/M2 | TEMPERATURE: 98.2 F | WEIGHT: 153 LBS | RESPIRATION RATE: 16 BRPM | OXYGEN SATURATION: 99 % | DIASTOLIC BLOOD PRESSURE: 64 MMHG | HEIGHT: 68 IN

## 2021-08-20 DIAGNOSIS — J02.9 PHARYNGITIS, UNSPECIFIED ETIOLOGY: Primary | ICD-10-CM

## 2021-08-20 DIAGNOSIS — B34.9 ACUTE VIRAL SYNDROME: ICD-10-CM

## 2021-08-20 LAB — S PYO AG THROAT QL: NEGATIVE

## 2021-08-20 PROCEDURE — U0005 INFEC AGEN DETEC AMPLI PROBE: HCPCS | Performed by: PHYSICIAN ASSISTANT

## 2021-08-20 PROCEDURE — U0003 INFECTIOUS AGENT DETECTION BY NUCLEIC ACID (DNA OR RNA); SEVERE ACUTE RESPIRATORY SYNDROME CORONAVIRUS 2 (SARS-COV-2) (CORONAVIRUS DISEASE [COVID-19]), AMPLIFIED PROBE TECHNIQUE, MAKING USE OF HIGH THROUGHPUT TECHNOLOGIES AS DESCRIBED BY CMS-2020-01-R: HCPCS | Performed by: PHYSICIAN ASSISTANT

## 2021-08-20 PROCEDURE — G0381 LEV 2 HOSP TYPE B ED VISIT: HCPCS | Performed by: PHYSICIAN ASSISTANT

## 2021-08-20 PROCEDURE — 87880 STREP A ASSAY W/OPTIC: CPT | Performed by: PHYSICIAN ASSISTANT

## 2021-08-20 NOTE — PATIENT INSTRUCTIONS
COVID testing initiated  Results typically take 2-4 days to return, however may take up to 10 days with extenuating circumstances  Most results are now back by 1-2 days  The easy and quickest way to get your tests results back is to sign up for a   [employer] My Chart account  Directions are listed on 1st page of this after visit summary  Using your My Chart account will also be the easiest way to get documentation of your test results if so needed  If you are having symptoms, please self isolate until you get your test results back  If your results come back as COVID not detected (negative) and you are still feeling poorly, recommend follow-up with your primary care to see if repeat testing is indicated  If you are having significant shortness of breath, chest pain, profound weakness call 911 or proceed to emergency room for emergency evaluation  If your results are positive, please continue self isolation, read through all of the information listed below and contact your primary care provider as soon as possible for further evaluation / discussion  If you do not have a primary care provider, you may call the [address] [place] [address] for questions  [phone]  If COVID test was done for screening, please self quarantine until you get your results back to lessen the likelihood of possible infection between testing and obtaining results  If COVID was done because you had exposure and you are not having symptoms, it is recommended that you quarantine for 14 days after your last contact with COVID positive person  Even if your COVID test is negative, quarantine for 14 days is standard recommendation as symptoms could develop after your initial test   If you want to break quarantine before the 14 days, please contact your primary care provider to see other possible options               COVID-19 (Coronavirus Disease 2019)   WHAT YOU NEED TO KNOW:   What do I need to know about coronavirus disease 2019 (COVID-19)? COVID-19 is the disease caused by the novel (new) coronavirus first discovered in December 2019  Coronaviruses generally cause upper respiratory (nose, throat, and lung) infections, such as a cold  The new virus can also cause serious lower respiratory conditions, such as pneumonia or acute respiratory distress syndrome (ARDS)  Anyone can develop serious problems from the new virus, but your risk is higher if you are 65 or older  A weak immune system, diabetes, or a heart or lung condition can also increase your risk  What are the signs and symptoms of COVID-19? You may not develop any signs or symptoms  Signs and symptoms that do develop usually start about 5 days after infection but can take 2 to 14 days  Signs and symptoms range from mild to severe  You may feel like you have the flu or a bad cold  Information on COVID-19 is still being learned  Tell your healthcare provider if you think you were infected but develop signs or symptoms not listed below:  · A cough    · Shortness of breath or trouble breathing that may become severe    · A fever of at least 100 4°F, or 38°C (may be lower in adults 65 or older)    · Chills that might include shaking    · Muscle pain, body aches, or a headache    · A sore throat    · Suddenly not being able to taste or smell anything    · Feeling mentally and physically tired (fatigue)    · Congestion (stuffy head and nose), or a runny nose    · Diarrhea, nausea, or vomiting    How is COVID-19 diagnosed? If you think you have COVID-19, call your healthcare provider  In some areas, testing is only done if a person has severe symptoms or is hospitalized  Testing is done more widely in other places  Your provider will tell you what to do based on your symptoms and the rules in your area  In general, the following may be used:  · A viral test  shows if you have a current infection  Samples are taken from your nose and throat, usually with swabs  You may need to wait several days to get the test results  Your healthcare provider will tell you how to get your results  You will need to quarantine (stay physically away from others) until you get your results  If results show you have COVID-19, you will need to quarantine until you are well  Your provider or other health official may give you more directions  You will also need to prevent another infection until it is known if you can get COVID-19 again  · An antibody test  shows if you had a past infection  Blood samples are used for this test  Antibodies are made by your immune system to attack the virus that causes COVID-19  Antibodies will form 1 to 3 weeks after you are infected  It is not known if antibodies prevent a second infection, or for how long a person might be protected  If you have antibodies, you will still need to be careful around others until more is known  · CT scans or x-rays  may be used to check for signs of pneumonia  The 2019 coronavirus causes a specific kind of pneumonia, usually in both lungs  How is COVID-19 treated? No medicine or specific treatment is currently approved for COVID-19  The following may be used to manage your symptoms or treat the effects of COVID-19:  · Mild symptoms  may get better on their own  If you do not need to be treated in a hospital, you will be given instructions to use at home  Your condition will be closely monitored  You will need to watch for worsening symptoms and seek immediate care if needed  Talk to your healthcare provider about the following:    ? Relieve your symptoms  To soothe a sore throat, gargle with warm salt water, or use throat lozenges or a throat spray  Your healthcare provider may recommend a cough medicine  Drink more liquids to thin and loosen mucus and to prevent dehydration  Use decongestants or saline drops as directed for nasal congestion      ? NSAIDs or acetaminophen  can help lower a fever and relieve body aches or a headache  Follow directions  If not taken correctly, NSAIDs can cause kidney damage and acetaminophen can cause liver damage  · Severe or life-threatening symptoms  are treated in the hospital  You may need a combination of the following:    ? Medicines  may be given to reduce inflammation or to fight the virus  You may also need blood thinners to prevent or treat blood clots  If you have a deep vein thrombosis (DVT) or pulmonary embolism (PE), you may need to keep using blood thinners for 3 months  ? Extra oxygen  may be given if you have respiratory failure  This means your lungs cannot get enough oxygen into your blood and out to your organs  Extra oxygen can help prevent organ failure  ? A ventilator  may be used to help you breathe  ? Convalescent plasma (part of blood)  from a patient who has recovered from COVID-19 may be used  The plasma contains antibodies that can help your body fight the infection  Convalescent plasma is only given to patients who have severe signs and symptoms  How does the 2019 coronavirus spread? The virus spreads quickly and easily  You can become infected if you are in contact with a large amount of the virus, even for a short time  You can also become infected by being around a small amount of virus for a long time  The following are ways the virus is thought to spread, but more information may be coming:  · Droplets are the most common way all coronaviruses spread  The virus can travel in droplets that form when a person talks, coughs, or sneezes  Anyone who breathes in the droplets or gets them in his or her eyes can become infected with the virus  Close personal contact with an infected person is thought to be the main way the virus spreads  Close personal contact means you are within 6 feet (2 meters) of the person  · Person-to-person contact can spread the virus    For example, a person with the virus on his or her hands can spread it by shaking hands with someone  At this time, it does not appear that the virus can be passed to a baby during pregnancy or delivery  The baby can be infected after he or she is born through person-to-person contact  The virus also does not appear to spread in breast milk  If you are pregnant or breastfeeding, talk to your healthcare provider or obstetrician about any concerns you have  · The virus can stay on objects and surfaces  A person can get the virus on his or her hands by touching the object or surface  Infection happens if the person then touches his or her eyes or mouth with unwashed hands  It is not yet known how long the virus can stay on an object or surface  That is why it is important to clean all surfaces that are used regularly  · An infected animal may be able to infect a person who touches it  This may happen at live markets or on a farm  How can everyone lower the risk for COVID-19? The best way to prevent infection is to avoid anyone who is infected, but this can be hard to do  An infected person can spread the virus before signs or symptoms begin, or even if signs or symptoms never develop  The following can help lower the risk for infection:      · Wash your hands often throughout the day  Use soap and water  Rub your soapy hands together, lacing your fingers  Wash the front and back of each hand, and in between your fingers  Use the fingers of one hand to scrub under the fingernails of the other hand  Wash for at least 20 seconds  Rinse with warm, running water for several seconds  Then dry your hands with a clean towel or paper towel  Use hand  that contains alcohol if soap and water are not available  Do not touch your eyes, nose, or mouth without washing your hands first  Teach children how to wash their hands and use hand   · Cover a sneeze or cough  This prevents droplets from traveling from you to others   Turn your face away and cover your mouth and nose with a tissue  Throw the tissue away  Use the bend of your arm if a tissue is not available  Then wash your hands well with soap and water or use hand   Turn and cover your face if you are around someone who is sneezing or coughing  Teach children how to cover a cough or sneeze  · Follow worldwide, national, and local social distancing guidelines  Social distancing means people avoid close physical contact so the virus cannot spread from one person to another  Keep at least 6 feet (2 meters) between you and others  Also keep this distance from anyone who comes to your home, such as someone making a delivery  · Make a habit of not touching your face  It is not known how long the virus can stay on objects and surfaces  If you get the virus on your hands, you can transfer it to your eyes, nose, or mouth and become infected  You can also transfer it to objects, surfaces, or people  Be aware of what you touch when you go out  Examples include handrails and elevator buttons  Try not to touch anything with bare hands unless it is necessary  Wash your hands before you leave your home and when you return  · Clean and disinfect high-touch surfaces and objects often  Use a disinfecting solution or wipes  You can make a solution by diluting 4 teaspoons of bleach in 1 quart (4 cups) of water  Clean and disinfect even if you think no one living in or coming to your home is infected with the virus  You can wipe items with a disinfecting cloth before you bring them into your home  Wash your hands after you handle anything you bring into your home  · Make your immune system as healthy as possible  A weakened immune system makes you more vulnerable to the new coronavirus  No COVID-19 vaccine is available yet  Vaccines such as the flu and pneumonia vaccines can help your immune system  Your healthcare provider can tell you which vaccines to get, and when to get them  Keep your immune system as strong as possible   Do not smoke  Eat healthy foods, exercise regularly, and try to manage stress  Go to bed and wake up at the same times each day  How do I follow social distancing guidelines to help lower the risk for COVID-19? National and local social distancing rules vary  Rules may change over time as restrictions are lifted  Restrictions may return if an outbreak happens where you live  It is important to know and follow all current social distancing rules in your area  The following are general guidelines:  · Limit trips out of your home  You may be able to have food, medicines, and other supplies delivered  If possible, have delivered items left at your door or other area  Try not to have someone hand you an item  You will be so close to the person that the virus can spread between you  · Do not have close physical contact with anyone who does not live in your home  Do not shake hands with, hug, or kiss a person as a greeting  Stand or walk as far from others as possible  If you must use public transportation (such as a bus or subway), try to sit or stand away from others  You can stay safely connected with others through phone calls, e-mail messages, social media websites, and video chats  Check in on anyone who may be having a hard time socially distancing, or who lives alone  Ask administrators at nursing homes or long-term care facilities how you can safely communicate with someone living there  · Wear a cloth face covering around others who do not live in your home  Face coverings help prevent the virus from spreading to others in droplets  You can use a clear face covering if someone needs to read your lips  This is a cloth covering that has plastic over the mouth area so your lips can be seen  Do not use coverings that have breathing valves or vents  The virus can travel out of the valve or vent and be spread to others  Do not take your covering off to talk, cough, or sneeze   Do not use coverings on children younger than 2 years or on anyone who has breathing problems or cannot remove it  · Only allow medical or other necessary professionals into your home  Wear your face covering, and remind professionals to wear a face covering  Remind them to wash their hands when they arrive and before they leave  Do not  let anyone who does not live in your home in, even if the person is not sick  A person can pass the virus to others before symptoms of COVID-19 begin  Some people never even develop symptoms  Children commonly have mild symptoms or no symptoms  It may be hard to tell a child not to hug or kiss you  Explain that this is how he or she can help you stay healthy  · Do not go to someone else's home unless it is necessary  Do not go over to visit, even if the person is lonely  Only go if you need to help him or her  Make sure you both wear face coverings while you are there  · Avoid large gatherings and crowds  Gatherings or crowds of 10 or more individuals can cause the virus to spread  Examples of gatherings include parties, sporting events, [employer] services, and conferences  Crowds may form at beaches, love, and tourist attractions  Protect yourself by staying away from large gatherings and crowds  · Ask your healthcare provider for other ways to have appointments  You may be able to have appointments without having to go into the provider's office  Some providers offer phone, video, or other types of appointments  You may also be able to get prescriptions for a few months of your medicines at a time  · Stay safe if you must go out to work  You may have a job that can only be done outside your home  Keep physical distance between you and other workers as much as possible  Follow your employer's rules so everyone stays safe  What should I do if I have COVID-19 and am recovering at home? Healthcare providers will give you specific instructions to follow   The following are general guidelines to remind you how to keep others safe until you are well:  · Wash your hands often  Use soap and water as much as possible  You can use hand  that contains alcohol if soap and water are not available  Do not share towels with anyone  If you use paper towels, throw them away in a lined trash can kept in your room or area  Use a covered trash can, if possible  · Do not go out of your home unless it is necessary  You may have to go to your healthcare provider's office for check-ups or to get prescription refills  Do not arrive at the provider's office without an appointment  Providers have to make their offices safe for staff and other patients  · Do not have close physical contact with anyone unless it is necessary  Only have close physical contact with a person giving direct care, or a baby or child you must care for  Family members and friends should not visit you  If possible, stay in a separate area or room of your home if you live with others  No one should go into the area or room except to give you care  You can visit with others by phone, video chat, e-mail, or similar systems  It is important to stay connected with others in your life while you recover  · Wear a face covering while others are near you  This can help prevent droplets from spreading the virus when you talk, sneeze, or cough  Put the covering on before anyone comes into your room or area  Remind the person to cover his or her nose and mouth before going in to provide care for you  · Do not share items  Do not share dishes, towels, or other items with anyone  Items need to be washed after you use them  · Protect your baby  Wash your hands with soap and water often throughout the day  Wear a clean face covering while you breastfeed, or while you express or pump breast milk  If possible, ask someone who is well to care for your baby  You can put breast milk in bottles for the person to use, if needed   Talk to your healthcare provider if you have any questions or concerns about caring for or bonding with your baby  He or she will tell you when to bring your baby in for check-ups and vaccines  He or she will also tell you what to do if you think your baby was infected with the new virus  · Do not handle live animals  Until more is known, it is best not to touch, play with, or handle live animals  Some animals, including pets, have been infected with the new coronavirus  Do not handle or care for animals until you are well  Care includes feeding, petting, and cuddling your pet  Do not let your pet lick you or share your food  Ask someone who is not infected to take care of your pet, if possible  If you must care for a pet, wear a face covering  Wash your hands before and after you give care  · Follow directions from your healthcare provider for being around others after you recover  You will need to wait at least 10 days after symptoms first appeared  Then you will need to have no fever for 24 hours without fever medicine, and no other symptoms  A loss of taste or smell may continue for several months  It is considered okay to be around others if this is your only symptom  It is not known for sure if or for how long a recovered person can pass the virus to others  Your provider may give you instructions, such as continuing social distancing or wearing a face covering around others  How should I take care of someone who has COVID-19? If the person lives in another home, arrange for a time to give care  Remember to bring a few pairs of disposable gloves and a cloth face covering  The following are general guidelines to help you safely care for anyone who has COVID-19:  · Wash your hands often  Wash before and after you go into the person's home, area, or room  Throw paper towels away in a lined trash can that has a lid, if possible  · Do not allow others to go near the person    No one should come into the person's home unless it is necessary  If possible, the person should be in a separate area or room if he or she lives with others  Keep the room's door shut unless you need to go in or out  Have others call, video chat, or e-mail the person if he or she is feeling well enough  The person may feel lonely if he or she is kept separate for a long period of time  Safe communication can help him or her stay connected to family and friends  · Make sure the person's room has good air flow  You may be able to open the window if the weather allows  An air conditioner can also be turned on to help air move  · Contact the person before you go in to give care  Make sure the person is wearing a face covering  Remind him or her to wash his or her hands with soap and water  He or she can use hand  that contains alcohol if soap and water are not available  Put on a face covering before you go in to give care  · Wear gloves while you give care and clean  Clean items the person uses often  Clean countertops, cooking surfaces, and the fronts and insides of the microwave and refrigerator  Clean the shower, toilet, the area around the toilet, the sink, the area around the sink, and faucets  Gather used laundry or bedding  Wash and dry items on the warmest settings the fabric allows  Wash dishes and silverware in hot, soapy water or in a   · Anything you throw away needs to go into a lined trash can  When you need to empty the trash, close the open end of the lining and tie it closed  This helps prevent items the virus is on from spilling out of the trash  Remove your gloves and throw them away  Wash your hands  Where can I find more information? · Centers for Disease Control and Prevention  [address] , [address]  Phone: 8- 510 [phone]  Web Address: [employer] com     What should I do if I think I or someone I know may be infected?   Do the following to protect others:  · If emergency care is needed,  tell the  about the possible infection, or call ahead and tell the emergency department  · Call a healthcare provider  for instructions if symptoms are mild  Anyone who may be infected should not  arrive without calling first  The provider will need to protect staff members and other patients  · The person who may be infected needs to wear a face covering  while getting medical care  This will help lower the risk of infecting others  Coverings are not used for anyone who is younger than 2 years, has breathing problems, or cannot remove it  The provider can give you instructions for anyone who cannot wear a covering  Call your local emergency number (911 in the [address]) or go to local emergency department if:   · You have trouble breathing or shortness of breath at rest     · You have chest pain or pressure that lasts longer than 5 minutes  · You become confused or hard to wake  · Your lips or face are blue  · You have a fever of 104°F (40°C) or higher  When should I call my doctor? · You do not  have symptoms of COVID-19 but had close physical contact within 14 days with someone who tested positive  · You have questions or concerns about your condition or care  CARE AGREEMENT:   You have the right to help plan your care  Learn about your health condition and how it may be treated  Discuss treatment options with your healthcare providers to decide what care you want to receive  You always have the right to refuse treatment  The above information is an  only  It is not intended as medical advice for individual conditions or treatments  Talk to your doctor, nurse or pharmacist before following any medical regimen to see if it is safe and effective for you  © Copyright [address] Information is for End User's use only and may not be sold, redistributed or otherwise used for commercial purposes   All illustrations and images included in [facility] are the copyrighted property of [employer]  or [employer]

## 2021-08-20 NOTE — TELEPHONE ENCOUNTER
----- [facility] sent at 8/20/2021  2:07 PM EDT -----   Labs okay    Mildly elevated LDL

## 2021-08-20 NOTE — PROGRESS NOTES
[facility]    NAME: Tony Gonzalez is a 23 y o  female  : 2000    MRN: [id number]  DATE: 2021  TIME: 11:33 AM    Assessment and Plan   Pharyngitis, unspecified etiology [J02 9]  1  Pharyngitis, unspecified etiology  Novel Coronavirus (Covid-19),PCR Reading [facility] - Office Collection    POCT rapid strepA   2  Acute viral syndrome         Patient Instructions   Patient Instructions     COVID testing initiated  Results typically take 2-4 days to return, however may take up to 10 days with extenuating circumstances  Most results are now back by 1-2 days  The easy and quickest way to get your tests results back is to sign up for a [facility] My Chart account  Directions are listed on 1st page of this after visit summary  Using your My Chart account will also be the easiest way to get documentation of your test results if so needed  If you are having symptoms, please self isolate until you get your test results back  If your results come back as COVID not detected (negative) and you are still feeling poorly, recommend follow-up with your primary care to see if repeat testing is indicated  If you are having significant shortness of breath, chest pain, profound weakness call 911 or proceed to emergency room for emergency evaluation  If your results are positive, please continue self isolation, read through all of the information listed below and contact your primary care provider as soon as possible for further evaluation / discussion  If you do not have a primary care provider, you may call the [address] [place] [address] for questions  [phone]  If COVID test was done for screening, please self quarantine until you get your results back to lessen the likelihood of possible infection between testing and obtaining results        If COVID was done because you had exposure and you are not having symptoms, it is recommended that you quarantine for 14 days after your last contact with COVID positive person  Even if your COVID test is negative, quarantine for 14 days is standard recommendation as symptoms could develop after your initial test   If you want to break quarantine before the 14 days, please contact your primary care provider to see other possible options  COVID-19 (Coronavirus Disease 2019)   WHAT YOU NEED TO KNOW:   What do I need to know about coronavirus disease 2019 (COVID-19)? COVID-19 is the disease caused by the novel (new) coronavirus first discovered in December 2019  Coronaviruses generally cause upper respiratory (nose, throat, and lung) infections, such as a cold  The new virus can also cause serious lower respiratory conditions, such as pneumonia or acute respiratory distress syndrome (ARDS)  Anyone can develop serious problems from the new virus, but your risk is higher if you are 65 or older  A weak immune system, diabetes, or a heart or lung condition can also increase your risk  What are the signs and symptoms of COVID-19? You may not develop any signs or symptoms  Signs and symptoms that do develop usually start about 5 days after infection but can take 2 to 14 days  Signs and symptoms range from mild to severe  You may feel like you have the flu or a bad cold  Information on COVID-19 is still being learned  Tell your healthcare provider if you think you were infected but develop signs or symptoms not listed below:  · A cough    · Shortness of breath or trouble breathing that may become severe    · A fever of at least 100 4°F, or 38°C (may be lower in adults 65 or older)    · Chills that might include shaking    · Muscle pain, body aches, or a headache    · A sore throat    · Suddenly not being able to taste or smell anything    · Feeling mentally and physically tired (fatigue)    · Congestion (stuffy head and nose), or a runny nose    · Diarrhea, nausea, or vomiting    How is COVID-19 diagnosed?   If you think you have COVID-19, call your healthcare provider  In some areas, testing is only done if a person has severe symptoms or is hospitalized  Testing is done more widely in other places  Your provider will tell you what to do based on your symptoms and the rules in your area  In general, the following may be used:  · A viral test  shows if you have a current infection  Samples are taken from your nose and throat, usually with swabs  You may need to wait several days to get the test results  Your healthcare provider will tell you how to get your results  You will need to quarantine (stay physically away from others) until you get your results  If results show you have COVID-19, you will need to quarantine until you are well  Your provider or other health official may give you more directions  You will also need to prevent another infection until it is known if you can get COVID-19 again  · An antibody test  shows if you had a past infection  Blood samples are used for this test  Antibodies are made by your immune system to attack the virus that causes COVID-19  Antibodies will form 1 to 3 weeks after you are infected  It is not known if antibodies prevent a second infection, or for how long a person might be protected  If you have antibodies, you will still need to be careful around others until more is known  · CT scans or x-rays  may be used to check for signs of pneumonia  The 2019 coronavirus causes a specific kind of pneumonia, usually in both lungs  How is COVID-19 treated? No medicine or specific treatment is currently approved for COVID-19  The following may be used to manage your symptoms or treat the effects of COVID-19:  · Mild symptoms  may get better on their own  If you do not need to be treated in a hospital, you will be given instructions to use at home  Your condition will be closely monitored  You will need to watch for worsening symptoms and seek immediate care if needed   Talk to your healthcare provider about the following:    ? Relieve your symptoms  To soothe a sore throat, gargle with warm salt water, or use throat lozenges or a throat spray  Your healthcare provider may recommend a cough medicine  Drink more liquids to thin and loosen mucus and to prevent dehydration  Use decongestants or saline drops as directed for nasal congestion  ? NSAIDs or acetaminophen  can help lower a fever and relieve body aches or a headache  Follow directions  If not taken correctly, NSAIDs can cause kidney damage and acetaminophen can cause liver damage  · Severe or life-threatening symptoms  are treated in the hospital  You may need a combination of the following:    ? Medicines  may be given to reduce inflammation or to fight the virus  You may also need blood thinners to prevent or treat blood clots  If you have a deep vein thrombosis (DVT) or pulmonary embolism (PE), you may need to keep using blood thinners for 3 months  ? Extra oxygen  may be given if you have respiratory failure  This means your lungs cannot get enough oxygen into your blood and out to your organs  Extra oxygen can help prevent organ failure  ? A ventilator  may be used to help you breathe  ? Convalescent plasma (part of blood)  from a patient who has recovered from COVID-19 may be used  The plasma contains antibodies that can help your body fight the infection  Convalescent plasma is only given to patients who have severe signs and symptoms  How does the 2019 coronavirus spread? The virus spreads quickly and easily  You can become infected if you are in contact with a large amount of the virus, even for a short time  You can also become infected by being around a small amount of virus for a long time  The following are ways the virus is thought to spread, but more information may be coming:  · Droplets are the most common way all coronaviruses spread  The virus can travel in droplets that form when a person talks, coughs, or sneezes   Anyone who breathes in the droplets or gets them in his or her eyes can become infected with the virus  Close personal contact with an infected person is thought to be the main way the virus spreads  Close personal contact means you are within 6 feet (2 meters) of the person  · Person-to-person contact can spread the virus  For example, a person with the virus on his or her hands can spread it by shaking hands with someone  At this time, it does not appear that the virus can be passed to a baby during pregnancy or delivery  The baby can be infected after he or she is born through person-to-person contact  The virus also does not appear to spread in breast milk  If you are pregnant or breastfeeding, talk to your healthcare provider or obstetrician about any concerns you have  · The virus can stay on objects and surfaces  A person can get the virus on his or her hands by touching the object or surface  Infection happens if the person then touches his or her eyes or mouth with unwashed hands  It is not yet known how long the virus can stay on an object or surface  That is why it is important to clean all surfaces that are used regularly  · An infected animal may be able to infect a person who touches it  This may happen at live markets or on a farm  How can everyone lower the risk for COVID-19? The best way to prevent infection is to avoid anyone who is infected, but this can be hard to do  An infected person can spread the virus before signs or symptoms begin, or even if signs or symptoms never develop  The following can help lower the risk for infection:      · Wash your hands often throughout the day  Use soap and water  Rub your soapy hands together, lacing your fingers  Wash the front and back of each hand, and in between your fingers  Use the fingers of one hand to scrub under the fingernails of the other hand  Wash for at least 20 seconds  Rinse with warm, running water for several seconds   Then dry your hands with a clean towel or paper towel  Use hand  that contains alcohol if soap and water are not available  Do not touch your eyes, nose, or mouth without washing your hands first  Teach children how to wash their hands and use hand   · Cover a sneeze or cough  This prevents droplets from traveling from you to others  Turn your face away and cover your mouth and nose with a tissue  Throw the tissue away  Use the bend of your arm if a tissue is not available  Then wash your hands well with soap and water or use hand   Turn and cover your face if you are around someone who is sneezing or coughing  Teach children how to cover a cough or sneeze  · Follow worldwide, national, and local social distancing guidelines  Social distancing means people avoid close physical contact so the virus cannot spread from one person to another  Keep at least 6 feet (2 meters) between you and others  Also keep this distance from anyone who comes to your home, such as someone making a delivery  · Make a habit of not touching your face  It is not known how long the virus can stay on objects and surfaces  If you get the virus on your hands, you can transfer it to your eyes, nose, or mouth and become infected  You can also transfer it to objects, surfaces, or people  Be aware of what you touch when you go out  Examples include handrails and elevator buttons  Try not to touch anything with bare hands unless it is necessary  Wash your hands before you leave your home and when you return  · Clean and disinfect high-touch surfaces and objects often  Use a disinfecting solution or wipes  You can make a solution by diluting 4 teaspoons of bleach in 1 quart (4 cups) of water  Clean and disinfect even if you think no one living in or coming to your home is infected with the virus  You can wipe items with a disinfecting cloth before you bring them into your home   Wash your hands after you handle anything you bring into your home  · Make your immune system as healthy as possible  A weakened immune system makes you more vulnerable to the new coronavirus  No COVID-19 vaccine is available yet  Vaccines such as the flu and pneumonia vaccines can help your immune system  Your healthcare provider can tell you which vaccines to get, and when to get them  Keep your immune system as strong as possible  Do not smoke  Eat healthy foods, exercise regularly, and try to manage stress  Go to bed and wake up at the same times each day  How do I follow social distancing guidelines to help lower the risk for COVID-19? National and local social distancing rules vary  Rules may change over time as restrictions are lifted  Restrictions may return if an outbreak happens where you live  It is important to know and follow all current social distancing rules in your area  The following are general guidelines:  · Limit trips out of your home  You may be able to have food, medicines, and other supplies delivered  If possible, have delivered items left at your door or other area  Try not to have someone hand you an item  You will be so close to the person that the virus can spread between you  · Do not have close physical contact with anyone who does not live in your home  Do not shake hands with, hug, or kiss a person as a greeting  Stand or walk as far from others as possible  If you must use public transportation (such as a bus or subway), try to sit or stand away from others  You can stay safely connected with others through phone calls, e-mail messages, social media websites, and video chats  Check in on anyone who may be having a hard time socially distancing, or who lives alone  Ask administrators at nursing homes or long-term care facilities how you can safely communicate with someone living there  · Wear a cloth face covering around others who do not live in your home    Face coverings help prevent the virus from spreading to others in droplets  You can use a clear face covering if someone needs to read your lips  This is a cloth covering that has plastic over the mouth area so your lips can be seen  Do not use coverings that have breathing valves or vents  The virus can travel out of the valve or vent and be spread to others  Do not take your covering off to talk, cough, or sneeze  Do not use coverings on children younger than 2 years or on anyone who has breathing problems or cannot remove it  · Only allow medical or other necessary professionals into your home  Wear your face covering, and remind professionals to wear a face covering  Remind them to wash their hands when they arrive and before they leave  Do not  let anyone who does not live in your home in, even if the person is not sick  A person can pass the virus to others before symptoms of COVID-19 begin  Some people never even develop symptoms  Children commonly have mild symptoms or no symptoms  It may be hard to tell a child not to hug or kiss you  Explain that this is how he or she can help you stay healthy  · Do not go to someone else's home unless it is necessary  Do not go over to visit, even if the person is lonely  Only go if you need to help him or her  Make sure you both wear face coverings while you are there  · Avoid large gatherings and crowds  Gatherings or crowds of 10 or more individuals can cause the virus to spread  Examples of gatherings include parties, sporting events, Sabianist services, and conferences  Crowds may form at beaches, love, and tourist attractions  Protect yourself by staying away from large gatherings and crowds  · Ask your healthcare provider for other ways to have appointments  You may be able to have appointments without having to go into the provider's office  Some providers offer phone, video, or other types of appointments   You may also be able to get prescriptions for a few months of your medicines at a time  · Stay safe if you must go out to work  You may have a job that can only be done outside your home  Keep physical distance between you and other workers as much as possible  Follow your employer's rules so everyone stays safe  What should I do if I have COVID-19 and am recovering at home? Healthcare providers will give you specific instructions to follow  The following are general guidelines to remind you how to keep others safe until you are well:  · Wash your hands often  Use soap and water as much as possible  You can use hand  that contains alcohol if soap and water are not available  Do not share towels with anyone  If you use paper towels, throw them away in a lined trash can kept in your room or area  Use a covered trash can, if possible  · Do not go out of your home unless it is necessary  You may have to go to your healthcare provider's office for check-ups or to get prescription refills  Do not arrive at the provider's office without an appointment  Providers have to make their offices safe for staff and other patients  · Do not have close physical contact with anyone unless it is necessary  Only have close physical contact with a person giving direct care, or a baby or child you must care for  Family members and friends should not visit you  If possible, stay in a separate area or room of your home if you live with others  No one should go into the area or room except to give you care  You can visit with others by phone, video chat, e-mail, or similar systems  It is important to stay connected with others in your life while you recover  · Wear a face covering while others are near you  This can help prevent droplets from spreading the virus when you talk, sneeze, or cough  Put the covering on before anyone comes into your room or area  Remind the person to cover his or her nose and mouth before going in to provide care for you  · Do not share items    Do not share dishes, towels, or other items with anyone  Items need to be washed after you use them  · Protect your baby  Wash your hands with soap and water often throughout the day  Wear a clean face covering while you breastfeed, or while you express or pump breast milk  If possible, ask someone who is well to care for your baby  You can put breast milk in bottles for the person to use, if needed  Talk to your healthcare provider if you have any questions or concerns about caring for or bonding with your baby  He or she will tell you when to bring your baby in for check-ups and vaccines  He or she will also tell you what to do if you think your baby was infected with the new virus  · Do not handle live animals  Until more is known, it is best not to touch, play with, or handle live animals  Some animals, including pets, have been infected with the new coronavirus  Do not handle or care for animals until you are well  Care includes feeding, petting, and cuddling your pet  Do not let your pet lick you or share your food  Ask someone who is not infected to take care of your pet, if possible  If you must care for a pet, wear a face covering  Wash your hands before and after you give care  · Follow directions from your healthcare provider for being around others after you recover  You will need to wait at least 10 days after symptoms first appeared  Then you will need to have no fever for 24 hours without fever medicine, and no other symptoms  A loss of taste or smell may continue for several months  It is considered okay to be around others if this is your only symptom  It is not known for sure if or for how long a recovered person can pass the virus to others  Your provider may give you instructions, such as continuing social distancing or wearing a face covering around others  How should I take care of someone who has COVID-19? If the person lives in another home, arrange for a time to give care   Remember to bring a few pairs of disposable gloves and a cloth face covering  The following are general guidelines to help you safely care for anyone who has COVID-19:  · Wash your hands often  Wash before and after you go into the person's home, area, or room  Throw paper towels away in a lined trash can that has a lid, if possible  · Do not allow others to go near the person  No one should come into the person's home unless it is necessary  If possible, the person should be in a separate area or room if he or she lives with others  Keep the room's door shut unless you need to go in or out  Have others call, video chat, or e-mail the person if he or she is feeling well enough  The person may feel lonely if he or she is kept separate for a long period of time  Safe communication can help him or her stay connected to family and friends  · Make sure the person's room has good air flow  You may be able to open the window if the weather allows  An air conditioner can also be turned on to help air move  · Contact the person before you go in to give care  Make sure the person is wearing a face covering  Remind him or her to wash his or her hands with soap and water  He or she can use hand  that contains alcohol if soap and water are not available  Put on a face covering before you go in to give care  · Wear gloves while you give care and clean  Clean items the person uses often  Clean countertops, cooking surfaces, and the fronts and insides of the microwave and refrigerator  Clean the shower, toilet, the area around the toilet, the sink, the area around the sink, and faucets  Gather used laundry or bedding  Wash and dry items on the warmest settings the fabric allows  Wash dishes and silverware in hot, soapy water or in a   · Anything you throw away needs to go into a lined trash can  When you need to empty the trash, close the open end of the lining and tie it closed   This helps prevent items the virus is on from spilling out of the trash  Remove your gloves and throw them away  Wash your hands  Where can I find more information? · Centers for Disease Control and Prevention  [address] , [address]  Phone: 1- 084 - 500-9878  Web Address: [employer] com     What should I do if I think I or someone I know may be infected? Do the following to protect others:  · If emergency care is needed,  tell the  about the possible infection, or call ahead and tell the emergency department  · Call a healthcare provider  for instructions if symptoms are mild  Anyone who may be infected should not  arrive without calling first  The provider will need to protect staff members and other patients  · The person who may be infected needs to wear a face covering  while getting medical care  This will help lower the risk of infecting others  Coverings are not used for anyone who is younger than 2 years, has breathing problems, or cannot remove it  The provider can give you instructions for anyone who cannot wear a covering  Call your local emergency number (911 in the [address] [place] [address],[address]) or go to local emergency department if:   · You have trouble breathing or shortness of breath at rest     · You have chest pain or pressure that lasts longer than 5 minutes  · You become confused or hard to wake  · Your lips or face are blue  · You have a fever of 104°F (40°C) or higher  When should I call my doctor? · You do not  have symptoms of COVID-19 but had close physical contact within 14 days with someone who tested positive  · You have questions or concerns about your condition or care  CARE AGREEMENT:   You have the right to help plan your care  Learn about your health condition and how it may be treated  Discuss treatment options with your healthcare providers to decide what care you want to receive  You always have the right to refuse treatment   The above information is an  only  It is not intended as medical advice for individual conditions or treatments  Talk to your doctor, nurse or pharmacist before following any medical regimen to see if it is safe and effective for you  © Copyright [address] Information is for End User's use only and may not be sold, redistributed or otherwise used for commercial purposes  All illustrations and images included in CareNotes® are the copyrighted property of [employer]  or [address] [place] [address]      Chief Complaint     Chief Complaint   Patient presents with    Sore Throat     Pt c/o sore throat, post-nasal drip as of yesterday  Denies fever  Pt is vaccinated for COVID  History of Present Illness   Abhay Martin presents to the clinic c/o    49-year-old female comes in with sore throat that started yesterday  Also postnasal drip  Feels tired and achy  No fever or chills at this time  Had strep test earlier that was negative  Potential exposure to COVI D  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  Has been vaccinated  Sore Throat   Associated symptoms include trouble swallowing  Pertinent negatives include no congestion  Review of Systems   Review of Systems   Constitutional: Positive for activity change, appetite change and fatigue  Negative for chills, diaphoresis and fever  HENT: Positive for postnasal drip, sore throat and trouble swallowing  Negative for congestion and rhinorrhea  Eyes: Negative  Respiratory: Negative  Cardiovascular: Negative  Hematological: Negative for adenopathy         Current Medications     Long-Term Medications   Medication Sig Dispense Refill    famotidine (PEPCID) 20 mg tablet Take 1 tablet (20 mg total) by mouth 2 (two) times a day 60 tablet 0    Junel FE 1/20 1-20 MG-MCG per tablet TAKE 1 TABLET BY MOUTH EVERY DAY 28 tablet 3       Current Allergies     Allergies as of 08/20/2021 - Reviewed 08/20/2021   Allergen Reaction Noted    Benadryl [diphenhydramine] Hives 04/19/2016          The following portions of the patient's history were reviewed and updated as appropriate: allergies, current medications, past family history, past medical history, past social history, past surgical history and problem list   Past Medical History:   Diagnosis Date    Depression 2011    Psychiatric disorder     depression     Past Surgical History:   Procedure Laterality Date    SKIN GRAFT      SKIN GRAFT      left elbow     Family History   Problem Relation Age of Onset    Hypothyroidism Mother     Bipolar disorder Mother     Mental illness Mother     Thyroid disease Mother         Hypothyroidism    No Known Problems Father     Cancer Maternal Grandmother         Breast cancer    Heart disease Maternal Grandmother     Breast cancer Maternal Grandmother     Lung cancer Maternal Grandfather     Cancer Maternal Grandfather         Lung cancer       Objective   /64   Pulse 77   Temp 98 2 °F (36 8 °C) (Tympanic)   Resp 16   Ht 5' 8" (1 727 m)   Wt 69 4 kg (153 lb)   LMP 08/18/2021 (Exact Date)   SpO2 99%   BMI 23 26 kg/m²   Patient's last menstrual period was 08/18/2021 (exact date)  Physical Exam     Physical Exam  Vitals and nursing note reviewed  Constitutional:       General: She is not in acute distress  Appearance: Normal appearance  She is well-developed  She is ill-appearing  She is not toxic-appearing or diaphoretic  Comments: Model appearing   HENT:      Head: Normocephalic and atraumatic  Right Ear: Tympanic membrane, ear canal and external ear normal       Left Ear: Tympanic membrane, ear canal and external ear normal       Nose: No congestion or rhinorrhea  Mouth/Throat:      Mouth: Mucous membranes are moist       Pharynx: No oropharyngeal exudate, posterior oropharyngeal erythema or uvula swelling  Tonsils: No tonsillar exudate or tonsillar abscesses        Comments: Cobblestoning posterior pharynx   Eyes:      General: Right eye: No discharge  Left eye: No discharge  Extraocular Movements: Extraocular movements intact  Conjunctiva/sclera: Conjunctivae normal       Pupils: Pupils are equal, round, and reactive to light  Cardiovascular:      Rate and Rhythm: Normal rate and regular rhythm  Pulmonary:      Effort: Pulmonary effort is normal  No respiratory distress  Breath sounds: Normal breath sounds  No stridor  No wheezing, rhonchi or rales  Musculoskeletal:      Cervical back: Normal range of motion and neck supple  No rigidity or tenderness  No muscular tenderness  Lymphadenopathy:      Cervical: No cervical adenopathy  Skin:     General: Skin is warm and dry  Findings: No rash  Neurological:      Mental Status: She is alert and oriented to person, place, and time     Psychiatric:         Mood and Affect: Mood normal          Behavior: Behavior normal

## 2021-08-21 LAB — SARS-COV-2 RNA RESP QL NAA+PROBE: NEGATIVE

## 2021-11-03 ENCOUNTER — IMMUNIZATIONS (OUTPATIENT)
Dept: FAMILY MEDICINE CLINIC | Facility: MEDICAL CENTER | Age: 21
End: 2021-11-03

## 2021-11-03 DIAGNOSIS — Z23 ENCOUNTER FOR IMMUNIZATION: Primary | ICD-10-CM

## 2021-11-03 PROCEDURE — 91300 SARSCOV2 VAC 30MCG/0.3ML IM: CPT

## 2021-11-05 ENCOUNTER — OFFICE VISIT (OUTPATIENT)
Dept: INTERNAL MEDICINE CLINIC | Facility: CLINIC | Age: 21
End: 2021-11-05
Payer: COMMERCIAL

## 2021-11-05 VITALS
TEMPERATURE: 97.6 F | RESPIRATION RATE: 16 BRPM | BODY MASS INDEX: 22.43 KG/M2 | WEIGHT: 148 LBS | OXYGEN SATURATION: 100 % | HEIGHT: 68 IN | DIASTOLIC BLOOD PRESSURE: 80 MMHG | HEART RATE: 73 BPM | SYSTOLIC BLOOD PRESSURE: 112 MMHG

## 2021-11-05 DIAGNOSIS — D22.9 CHANGE IN MULTIPLE NEVI: Primary | ICD-10-CM

## 2021-11-05 PROCEDURE — 99214 OFFICE O/P EST MOD 30 MIN: CPT | Performed by: INTERNAL MEDICINE

## 2021-11-09 ENCOUNTER — TELEPHONE (OUTPATIENT)
Dept: OBGYN CLINIC | Facility: MEDICAL CENTER | Age: 21
End: 2021-11-09

## 2021-11-09 DIAGNOSIS — Z32.01 POSITIVE PREGNANCY TEST: Primary | ICD-10-CM

## 2021-11-10 ENCOUNTER — TELEPHONE (OUTPATIENT)
Dept: OBGYN CLINIC | Facility: MEDICAL CENTER | Age: 21
End: 2021-11-10

## 2021-11-23 ENCOUNTER — LAB (OUTPATIENT)
Dept: LAB | Facility: HOSPITAL | Age: 21
End: 2021-11-23
Attending: OBSTETRICS & GYNECOLOGY
Payer: COMMERCIAL

## 2021-11-23 DIAGNOSIS — Z32.01 POSITIVE PREGNANCY TEST: ICD-10-CM

## 2021-11-23 LAB
B-HCG SERPL-ACNC: ABNORMAL MIU/ML
PROGEST SERPL-MCNC: 15.1 NG/ML

## 2021-11-23 PROCEDURE — 84144 ASSAY OF PROGESTERONE: CPT

## 2021-11-23 PROCEDURE — 84702 CHORIONIC GONADOTROPIN TEST: CPT

## 2021-11-23 PROCEDURE — 36415 COLL VENOUS BLD VENIPUNCTURE: CPT

## 2021-11-29 DIAGNOSIS — Z34.91 FIRST TRIMESTER PREGNANCY: ICD-10-CM

## 2021-11-29 DIAGNOSIS — R42 DIZZINESS: Primary | ICD-10-CM

## 2021-11-30 ENCOUNTER — APPOINTMENT (OUTPATIENT)
Dept: LAB | Facility: HOSPITAL | Age: 21
End: 2021-11-30
Attending: OBSTETRICS & GYNECOLOGY
Payer: COMMERCIAL

## 2021-11-30 DIAGNOSIS — Z34.91 FIRST TRIMESTER PREGNANCY: ICD-10-CM

## 2021-11-30 DIAGNOSIS — R42 DIZZINESS: ICD-10-CM

## 2021-11-30 LAB
ABO GROUP BLD: NORMAL
ANION GAP SERPL CALCULATED.3IONS-SCNC: 13 MMOL/L (ref 4–13)
BASOPHILS # BLD AUTO: 0.04 THOUSANDS/ΜL (ref 0–0.1)
BASOPHILS NFR BLD AUTO: 0 % (ref 0–1)
BILIRUB UR QL STRIP: NEGATIVE
BLD GP AB SCN SERPL QL: NEGATIVE
BUN SERPL-MCNC: 5 MG/DL (ref 5–25)
CALCIUM SERPL-MCNC: 9.2 MG/DL (ref 8.3–10.1)
CHLORIDE SERPL-SCNC: 102 MMOL/L (ref 100–108)
CLARITY UR: CLEAR
CO2 SERPL-SCNC: 25 MMOL/L (ref 21–32)
COLOR UR: YELLOW
CREAT SERPL-MCNC: 0.59 MG/DL (ref 0.6–1.3)
EOSINOPHIL # BLD AUTO: 0.06 THOUSAND/ΜL (ref 0–0.61)
EOSINOPHIL NFR BLD AUTO: 1 % (ref 0–6)
ERYTHROCYTE [DISTWIDTH] IN BLOOD BY AUTOMATED COUNT: 13.1 % (ref 11.6–15.1)
FERRITIN SERPL-MCNC: 40 NG/ML (ref 8–388)
GFR SERPL CREATININE-BSD FRML MDRD: 130 ML/MIN/1.73SQ M
GLUCOSE SERPL-MCNC: 89 MG/DL (ref 65–140)
GLUCOSE UR STRIP-MCNC: NEGATIVE MG/DL
HBV SURFACE AG SER QL: NORMAL
HCT VFR BLD AUTO: 37.6 % (ref 34.8–46.1)
HGB BLD-MCNC: 12.5 G/DL (ref 11.5–15.4)
HGB UR QL STRIP.AUTO: NEGATIVE
IMM GRANULOCYTES # BLD AUTO: 0.03 THOUSAND/UL (ref 0–0.2)
IMM GRANULOCYTES NFR BLD AUTO: 0 % (ref 0–2)
KETONES UR STRIP-MCNC: NEGATIVE MG/DL
LEUKOCYTE ESTERASE UR QL STRIP: NEGATIVE
LYMPHOCYTES # BLD AUTO: 1.88 THOUSANDS/ΜL (ref 0.6–4.47)
LYMPHOCYTES NFR BLD AUTO: 21 % (ref 14–44)
MCH RBC QN AUTO: 30.7 PG (ref 26.8–34.3)
MCHC RBC AUTO-ENTMCNC: 33.2 G/DL (ref 31.4–37.4)
MCV RBC AUTO: 92 FL (ref 82–98)
MONOCYTES # BLD AUTO: 0.77 THOUSAND/ΜL (ref 0.17–1.22)
MONOCYTES NFR BLD AUTO: 9 % (ref 4–12)
NEUTROPHILS # BLD AUTO: 6.33 THOUSANDS/ΜL (ref 1.85–7.62)
NEUTS SEG NFR BLD AUTO: 69 % (ref 43–75)
NITRITE UR QL STRIP: NEGATIVE
NRBC BLD AUTO-RTO: 0 /100 WBCS
PH UR STRIP.AUTO: 6 [PH]
PLATELET # BLD AUTO: 292 THOUSANDS/UL (ref 149–390)
PMV BLD AUTO: 9.5 FL (ref 8.9–12.7)
POTASSIUM SERPL-SCNC: 3.3 MMOL/L (ref 3.5–5.3)
PROT UR STRIP-MCNC: NEGATIVE MG/DL
RBC # BLD AUTO: 4.07 MILLION/UL (ref 3.81–5.12)
RH BLD: POSITIVE
RUBV IGG SERPL IA-ACNC: 170.5 IU/ML
SODIUM SERPL-SCNC: 140 MMOL/L (ref 136–145)
SP GR UR STRIP.AUTO: 1.01 (ref 1–1.03)
SPECIMEN EXPIRATION DATE: NORMAL
UROBILINOGEN UR QL STRIP.AUTO: 0.2 E.U./DL
WBC # BLD AUTO: 9.11 THOUSAND/UL (ref 4.31–10.16)

## 2021-11-30 PROCEDURE — 87086 URINE CULTURE/COLONY COUNT: CPT

## 2021-11-30 PROCEDURE — 81003 URINALYSIS AUTO W/O SCOPE: CPT

## 2021-11-30 PROCEDURE — 82728 ASSAY OF FERRITIN: CPT

## 2021-11-30 PROCEDURE — 36415 COLL VENOUS BLD VENIPUNCTURE: CPT

## 2021-11-30 PROCEDURE — 80081 OBSTETRIC PANEL INC HIV TSTG: CPT

## 2021-11-30 PROCEDURE — 80048 BASIC METABOLIC PNL TOTAL CA: CPT

## 2021-12-01 LAB
BACTERIA UR CULT: NORMAL
HIV 1+2 AB+HIV1 P24 AG SERPL QL IA: NORMAL
RPR SER QL: NORMAL

## 2021-12-06 ENCOUNTER — TELEPHONE (OUTPATIENT)
Dept: OBGYN CLINIC | Facility: MEDICAL CENTER | Age: 21
End: 2021-12-06

## 2021-12-07 ENCOUNTER — OFFICE VISIT (OUTPATIENT)
Dept: URGENT CARE | Facility: CLINIC | Age: 21
End: 2021-12-07
Payer: COMMERCIAL

## 2021-12-07 VITALS — TEMPERATURE: 97.6 F | RESPIRATION RATE: 18 BRPM | OXYGEN SATURATION: 100 % | HEART RATE: 107 BPM

## 2021-12-07 DIAGNOSIS — J02.9 PHARYNGITIS, UNSPECIFIED ETIOLOGY: ICD-10-CM

## 2021-12-07 DIAGNOSIS — J06.9 UPPER RESPIRATORY TRACT INFECTION, UNSPECIFIED TYPE: Primary | ICD-10-CM

## 2021-12-07 PROCEDURE — G0382 LEV 3 HOSP TYPE B ED VISIT: HCPCS | Performed by: PHYSICIAN ASSISTANT

## 2021-12-07 PROCEDURE — 0241U HB NFCT DS VIR RESP RNA 4 TRGT: CPT | Performed by: PHYSICIAN ASSISTANT

## 2021-12-07 RX ORDER — AMOXICILLIN 500 MG/1
500 TABLET, FILM COATED ORAL 3 TIMES DAILY
Qty: 21 TABLET | Refills: 0 | Status: SHIPPED | OUTPATIENT
Start: 2021-12-07 | End: 2021-12-14

## 2021-12-08 LAB
FLUAV RNA RESP QL NAA+PROBE: NEGATIVE
FLUBV RNA RESP QL NAA+PROBE: NEGATIVE
RSV RNA RESP QL NAA+PROBE: NEGATIVE
SARS-COV-2 RNA RESP QL NAA+PROBE: NEGATIVE

## 2021-12-13 ENCOUNTER — TELEPHONE (OUTPATIENT)
Dept: OBGYN CLINIC | Facility: MEDICAL CENTER | Age: 21
End: 2021-12-13

## 2021-12-13 DIAGNOSIS — Z32.01 POSITIVE PREGNANCY TEST: Primary | ICD-10-CM

## 2021-12-14 ENCOUNTER — HOSPITAL ENCOUNTER (OUTPATIENT)
Dept: ULTRASOUND IMAGING | Facility: HOSPITAL | Age: 21
Discharge: HOME | End: 2021-12-14
Attending: STUDENT IN AN ORGANIZED HEALTH CARE EDUCATION/TRAINING PROGRAM
Payer: COMMERCIAL

## 2021-12-14 DIAGNOSIS — Z32.01 POSITIVE PREGNANCY TEST: ICD-10-CM

## 2021-12-14 PROCEDURE — 76801 OB US < 14 WKS SINGLE FETUS: CPT

## 2021-12-20 ENCOUNTER — PATIENT MESSAGE (OUTPATIENT)
Dept: INTERNAL MEDICINE CLINIC | Facility: CLINIC | Age: 21
End: 2021-12-20

## 2021-12-20 DIAGNOSIS — J06.9 URTI (ACUTE UPPER RESPIRATORY INFECTION): Primary | ICD-10-CM

## 2021-12-21 PROCEDURE — U0005 INFEC AGEN DETEC AMPLI PROBE: HCPCS | Performed by: INTERNAL MEDICINE

## 2021-12-21 PROCEDURE — U0003 INFECTIOUS AGENT DETECTION BY NUCLEIC ACID (DNA OR RNA); SEVERE ACUTE RESPIRATORY SYNDROME CORONAVIRUS 2 (SARS-COV-2) (CORONAVIRUS DISEASE [COVID-19]), AMPLIFIED PROBE TECHNIQUE, MAKING USE OF HIGH THROUGHPUT TECHNOLOGIES AS DESCRIBED BY CMS-2020-01-R: HCPCS | Performed by: INTERNAL MEDICINE

## 2021-12-23 ENCOUNTER — TELEMEDICINE (OUTPATIENT)
Dept: INTERNAL MEDICINE CLINIC | Facility: CLINIC | Age: 21
End: 2021-12-23
Payer: COMMERCIAL

## 2021-12-23 VITALS — TEMPERATURE: 100.5 F | OXYGEN SATURATION: 98 % | HEART RATE: 120 BPM

## 2021-12-23 DIAGNOSIS — U07.1 COVID-19: Primary | ICD-10-CM

## 2021-12-23 PROCEDURE — 99214 OFFICE O/P EST MOD 30 MIN: CPT | Performed by: INTERNAL MEDICINE

## 2022-01-04 ENCOUNTER — TELEMEDICINE (OUTPATIENT)
Dept: INTERNAL MEDICINE CLINIC | Facility: CLINIC | Age: 22
End: 2022-01-04
Payer: COMMERCIAL

## 2022-01-04 VITALS — OXYGEN SATURATION: 99 % | HEART RATE: 102 BPM | TEMPERATURE: 98.9 F

## 2022-01-04 DIAGNOSIS — U07.1 COVID-19: Primary | ICD-10-CM

## 2022-01-04 DIAGNOSIS — R50.9 FEVER, UNSPECIFIED FEVER CAUSE: ICD-10-CM

## 2022-01-04 PROCEDURE — 99214 OFFICE O/P EST MOD 30 MIN: CPT | Performed by: INTERNAL MEDICINE

## 2022-01-04 NOTE — PROGRESS NOTES
COVID-19 Outpatient Progress Note    Assessment/Plan:    The cause of her fever is unclear  Possibly residual from COVID infection? No urinary symptoms, GI symptoms, gyn symptoms, or new respiratory symptoms  Could potentially check urinalysis to look for occult infection if fevers persist  PE would be in the differential but low suspicion for this would not pursue workup at this time  Tension versus migraine type headaches  I advised her to continue with Tylenol for now and to let me know if she develops any new symptoms suggestive of infection  Get plenty of rest, hydration, she is taking a leave of absence from work which I think is a good idea  -continue follow-up with obstetrician this Friday as scheduled      Problem List Items Addressed This Visit     None      Visit Diagnoses     COVID-19    -  Primary    Fever, unspecified fever cause             Nic Lee is seen today for COVID follow-up visit  Was previously evaluated via telemedicine visit on 12/23  Onset of symptoms around 12/18 - congestion, sore throat, coughing, sneezing, fevers, myalgias    She improved from a COVID standpoint and yesterday had her 1st day back to work  Around 10:00 p m  She developed a bad headache  Described as a throbbing sensation  Worse with movement, some phono sensitivity  No photosensitivity, vomiting  She did have a low-grade fever of 100 4 on 2 occasions  At this point, her congestion and coughing are gone  She states she has had some mild shortness of breath since 4 weeks of her pregnancy but this has not been progressive  She does get occasional sharp chest pressure which occurs mostly in the setting of getting worked up  Her father in law unfortunately recently passed away from [facility]    She further denies dysuria, vaginal bleeding, spotting, abdominal pain, vision changes, muscle weakness, rash, ulcer, diarrhea    She has her 1st appointment with her obstetrician upcoming this Friday        COVID-19 Plan Encounter provider Sarath Chen DO    Provider located at [facility] [id number]    Recent Visits  No visits were found meeting these conditions  Showing recent visits within past 7 days and meeting all other requirements  Today's Visits  Date Type Provider Dept   01/04/22 Telemedicine Sarath Chen DO [facility] [place] [facility]""   Showing today's visits and meeting all other requirements  Future Appointments  No visits were found meeting these conditions  Showing future appointments within next 150 days and meeting all other requirements     COVID-19 HPI  Lab Results   Component Value Date    SARSCOV2 Positive (A) 12/21/2021    SARSCOV2 Not Detected 01/09/2021     Past Medical History:   Diagnosis Date    Depression 2011    Psychiatric disorder     depression     Past Surgical History:   Procedure Laterality Date    SKIN GRAFT      SKIN GRAFT      left elbow     Current Outpatient Medications   Medication Sig Dispense Refill    Prenatal Vit-Fe Fumarate-FA (PRENATAL VITAMIN PO)       BIOTIN PO  (Patient not taking: Reported on 12/7/2021 )      famotidine (PEPCID) 20 mg tablet Take 1 tablet (20 mg total) by mouth 2 (two) times a day (Patient not taking: Reported on 1/4/2022 ) 60 tablet 0    Junel FE 1/20 1-20 MG-MCG per tablet TAKE 1 TABLET BY MOUTH EVERY DAY (Patient not taking: Reported on 1/4/2022) 28 tablet 3     No current facility-administered medications for this visit  Allergies   Allergen Reactions    Benadryl [Diphenhydramine] Hives       Review of Systems  Objective:    Vitals:    01/04/22 0825   Pulse: 102   Temp: 98 9 °F (37 2 °C)   SpO2: 99%       Physical Exam    VIRTUAL VISIT DISCLAIMER    Yevgeniy Cooney verbally agrees to participate in [employer]   Pt is aware that [employer] could be limited without vital signs or the ability to perform a full hands-on physical Frank Bhatia understands she or the provider may request at any time to terminate the video visit and request the patient to seek care or treatment in person

## 2022-01-13 PROBLEM — O98.511 COVID-19 AFFECTING PREGNANCY IN FIRST TRIMESTER: Status: ACTIVE | Noted: 2022-01-13

## 2022-01-13 PROBLEM — U07.1 COVID-19 AFFECTING PREGNANCY IN FIRST TRIMESTER: Status: ACTIVE | Noted: 2022-01-13

## 2022-01-13 NOTE — PATIENT INSTRUCTIONS
Maternal fetal medicine - [phone]- call for visit         Pregnancy at 15 to [address]:   What changes are happening in my body? Now that you are in your second trimester, you have more energy  You may also feel hungrier than usual  You may start to experience other symptoms, such as heartburn or dizziness  You may be gaining about ½ to 1 pound a week, and your pregnancy is beginning to show  You may need to start wearing maternity clothes  How do I care for myself at this stage of my pregnancy? · Manage heartburn  by eating 4 or 5 small meals each day instead of large meals  Avoid spicy foods  Avoid eating right before bedtime  · Manage nausea and vomiting  Avoid fatty and spicy foods  Eat small meals throughout the day instead of large meals  Irene may help to decrease nausea  Ask your healthcare provider about other ways of decreasing nausea and vomiting  · Eat a variety of healthy foods  Healthy foods include fruits, vegetables, whole-grain breads, low-fat dairy foods, beans, lean meats, and fish  Drink liquids as directed  Ask how much liquid to drink each day and which liquids are best for you  Limit caffeine to less than 200 milligrams each day  Limit your intake of fish to 2 servings each week  Choose fish low in mercury such as canned light tuna, shrimp, salmon, cod, or tilapia  Do not  eat fish high in mercury such as swordfish, tilefish, licha mackerel, and shark  · Take prenatal vitamins as directed  Your need for certain vitamins and minerals, such as folic acid, increases during pregnancy  Prenatal vitamins provide some of the extra vitamins and minerals you need  Prenatal vitamins may also help to decrease the risk of certain birth defects  · Do not smoke  Smoking increases your risk of a miscarriage and other health problems during your pregnancy  Smoking can cause your baby to be born too early or weigh less at birth   Ask your healthcare provider for information if you need help quitting  · Do not drink alcohol  Alcohol passes from your body to your baby through the placenta  It can affect your baby's brain development and cause fetal alcohol syndrome (FAS)  FAS is a group of conditions that causes mental, behavior, and growth problems  · Talk to your healthcare provider before you take any medicines  Many medicines may harm your baby if you take them when you are pregnant  Do not take any medicines, vitamins, herbs, or supplements without first talking to your healthcare provider  Never use illegal or street drugs (such as marijuana or cocaine) while you are pregnant  What are some safety tips during pregnancy? · Avoid hot tubs and saunas  Do not use a hot tub or sauna while you are pregnant, especially during your first trimester  Hot tubs and saunas may raise your baby's temperature and increase the risk of birth defects  · Avoid toxoplasmosis  This is an infection caused by eating raw meat or being around infected cat feces  It can cause birth defects, miscarriages, and other problems  Wash your hands after you touch raw meat  Make sure any meat is well-cooked before you eat it  Avoid raw eggs and unpasteurized milk  Use gloves or ask someone else to clean your cat's litter box while you are pregnant  What changes are happening with my baby? By 18 weeks, your baby may be about 6 inches long from the top of the head to the rump (baby's bottom)  Your baby may weigh about 11 ounces  You may be able to feel your baby's movement at about 18 weeks or later  The first movements may not be that noticeable  They may feel like a fluttering sensation  Your baby also makes sucking movements and can hear certain sounds  What do I need to know about prenatal care? During the first 28 weeks of your pregnancy, you will see your healthcare provider once a month   Your healthcare provider will check your blood pressure and weight  You may also need any of the following:  · A urine test  may also be done to check for sugar and protein  These can be signs of gestational diabetes or infection  · A blood test  may be done to check for anemia (low iron level)  · Fundal height check  is a measurement of your uterus to check your baby's growth  This number is usually the same as the number of weeks that you have been pregnant  · An ultrasound  may be done to check your baby's development  Your healthcare provider may be able to tell you what your baby's gender is during the ultrasound  · Your baby's heart rate  will be checked  When should I seek immediate care? · You have pain or cramping in your abdomen or low back  · You have heavy vaginal bleeding or clotting  · You pass material that looks like tissue or large clots  Collect the material and bring it with you  When should I call my doctor or obstetrician? · You cannot keep food or drinks down, and you are losing weight  · You have light bleeding  · You have chills or a fever  · You have vaginal itching, burning, or pain  · You have yellow, green, white, or foul-smelling vaginal discharge  · You have pain or burning when you urinate, less urine than usual, or pink or bloody urine  · You have questions or concerns about your condition or care  CARE AGREEMENT:   You have the right to help plan your care  Learn about your health condition and how it may be treated  Discuss treatment options with your healthcare providers to decide what care you want to receive  You always have the right to refuse treatment  The above information is an  only  It is not intended as medical advice for individual conditions or treatments  Talk to your doctor, nurse or pharmacist before following any medical regimen to see if it is safe and effective for you    © Copyright [employer] 2021 Information is for End User's use only and may not be sold, redistributed or otherwise used for commercial purposes  All illustrations and images included in CareNotes® are the copyrighted property of A D A M , Inc  or [address]  Pregnancy at [address] to [address]:   What changes are happening in my body? You are now at the end of your first trimester and entering your second trimester  Morning sickness usually goes away by this time  You may have other symptoms such as fatigue, frequent urination, and headaches  You may have gained 2 to 4 pounds by now  How do I care for myself at this stage of my pregnancy? · Get plenty of rest   You may feel more tired than normal  You may need to take naps or go to bed earlier  · Manage nausea and vomiting  Avoid fatty and spicy foods  Eat small meals throughout the day instead of large meals  Irene may help to decrease nausea  Ask your healthcare provider about other ways of decreasing nausea and vomiting  · Eat a variety of healthy foods  Healthy foods include fruits, vegetables, whole-grain breads, low-fat dairy foods, beans, lean meats, and fish  Drink liquids as directed  Ask how much liquid to drink each day and which liquids are best for you  Limit caffeine to less than 200 milligrams each day  Limit your intake of fish to 2 servings each week  Choose fish low in mercury such as canned light tuna, shrimp, salmon, cod, or tilapia  Do not  eat fish high in mercury such as swordfish, tilefish, licha mackerel, and shark  · Take prenatal vitamins as directed  Your need for certain vitamins and minerals, such as folic acid, increases during pregnancy  Prenatal vitamins provide some of the extra vitamins and minerals you need  Prenatal vitamins may also help to decrease the risk of certain birth defects  · Do not smoke  Smoking increases your risk of a miscarriage and other health problems during your pregnancy   Smoking can cause your baby to be born too early or weigh less at birth  Ask your healthcare provider for information if you need help quitting  · Do not drink alcohol  Alcohol passes from your body to your baby through the placenta  It can affect your baby's brain development and cause fetal alcohol syndrome (FAS)  FAS is a group of conditions that causes mental, behavior, and growth problems  · Talk to your healthcare provider before you take any medicines  Many medicines may harm your baby if you take them when you are pregnant  Do not take any medicines, vitamins, herbs, or supplements without first talking to your healthcare provider  Never use illegal or street drugs (such as marijuana or cocaine) while you are pregnant  What are some safety tips during pregnancy? · Avoid hot tubs and saunas  Do not use a hot tub or sauna while you are pregnant, especially during your first trimester  Hot tubs and saunas may raise your baby's temperature and increase the risk of birth defects  · Avoid toxoplasmosis  This is an infection caused by eating raw meat or being around infected cat feces  It can cause birth defects, miscarriages, and other problems  Wash your hands after you touch raw meat  Make sure any meat is well-cooked before you eat it  Avoid raw eggs and unpasteurized milk  Use gloves or ask someone else to clean your cat's litter box while you are pregnant  What changes are happening with my baby? Your baby has fully formed fingernails and toenails  Your baby's heartbeat can now be heard  Ask your healthcare provider if you can listen to your baby's heartbeat  By week 14, your baby is over 4 inches long from the top of the head to the rump (baby's bottom)  Your baby weighs over 3 ounces  What do I need to know about prenatal care? Prenatal care is a series of visits with your healthcare provider throughout your pregnancy  During the first 28 weeks of your pregnancy, you will see your healthcare provider 1 time each month   Prenatal care can help prevent problems during pregnancy and childbirth  Your healthcare provider will check your blood pressure and weight  Your baby's heart rate will also be checked  You may also need the following at some visits:  · A pelvic exam  allows your healthcare provider to see your cervix (the bottom part of your uterus)  Your healthcare provider will use a speculum to open your vagina  He or she will check the size and shape of your uterus  · Blood tests  may be done to check for any of the following:    ? Gestational diabetes or anemia (low iron level)    ? Blood type or Rh factor, or certain birth defects    ? Immunity to certain diseases, such as chickenpox or rubella    ? An infection, such as a sexually transmitted infection, HIV, or hepatitis B    · Hepatitis B  may need to be prevented or treated  Hepatitis B is inflammation of the liver caused by the hepatitis B virus (HBV)  HBV can spread from a mother to her baby during delivery  You will be checked for HBV as early as possible in the first trimester of each pregnancy  You need the test even if you received the hepatitis B vaccine or were tested before  You may need to have an HBV infection treated before you give birth  · Urine tests  may also be done to check for sugar and protein  These can be signs of gestational diabetes or preeclampsia  Urine tests may also be done to check for signs of infection  · A fetal ultrasound  shows pictures of your baby inside your uterus  The pictures are used to check your baby's development, movement, and position  · Genetic disorder screening tests  may be offered to you  These tests check your baby's risk for genetic disorders such as Down syndrome  A screening test includes a blood test and ultrasound  When should I seek immediate care? · You have pain or cramping in your abdomen or low back  · You have heavy vaginal bleeding or clotting      · You pass material that looks like tissue or large clots  Collect the material and bring it with you  When should I call my doctor or obstetrician? · You cannot keep food or drinks down, and you are losing weight  · You have light bleeding  · You have chills or a fever  · You have vaginal itching, burning, or pain  · You have yellow, green, white, or foul-smelling vaginal discharge  · You have pain or burning when you urinate, less urine than usual, or pink or bloody urine  · You have questions or concerns about your condition or care  CARE AGREEMENT:   You have the right to help plan your care  Learn about your health condition and how it may be treated  Discuss treatment options with your healthcare providers to decide what care you want to receive  You always have the right to refuse treatment  The above information is an  only  It is not intended as medical advice for individual conditions or treatments  Talk to your doctor, nurse or pharmacist before following any medical regimen to see if it is safe and effective for you  © Copyright [employer] 2021 Information is for End User's use only and may not be sold, redistributed or otherwise used for commercial purposes  All illustrations and images included in CareNotes® are the copyrighted property of A D A M , Inc  or [employer]      Breastfeeding    Benefits of breastfeeding  o Are less likely to develop allergies  o Have increased protection against bacterial meningitis  o Have fewer middle ear infections  o Have fewer learning disabilities  o Have fewer behavioral difficulties  o Have higher IQ's  o Have lower rates of respiratory illness  o Have lower obesity  o Are less likely to develop juvenile diabetes and some childhood cancers  o Are less likely to die from Sudden Infant Death Syndrome  o A healthier baby means fewer visits to the doctor and the pharmacy  o Breastfeeding is environmentally friendly   There is nothing to throw away    o Breastfeeding is free; formula can cost over $1000 for the first year of life  o There is less vaginal bleeding immediately after delivery and a faster return to your pre-pregnant size  - Mothers who breastfeed a lifetime total of 2 years have:   A 40% decreased risk of breast cancer    A decreased risk of ovarian cancer   Lower rates of osteoporosis, diabetes and heart disease   Importance of exclusive breastfeeding  o By providing the ideal food for the healthy growth and development of infants, exclusive  infants receive only breast milk    o Exclusive breastfeeding for the first 6 months is very important to improve an infant's health and reduce childhood illness   Exclusive breastfeeding for the first 6 months  o The American Academy of Pediatrics recommends exclusive breastfeeding for the first six months and continued breastfeeding with supplementation of solids for the next 6 months   Early initiation of breastfeeding  o Women who feed their infants within the first hour of birth is referred to as Lucia Suazo initiation of breastfeeding and ensures that the  receives colostrum  o Colostrum is rich in antibodies and essential nutrients   Rooming-In  o May stabilize 's breathing and heart rate  o Reduce crying  o Improve ability for  to maintain temperature and blood sugar levels  o Early stimulation of baby's immune system  o Calming effects  o Easier and faster bonding   o Rooming-in promotes getting to know your    o Rooming-in makes breastfeeding easier  o Women who room-in with their newborns make more milk and produce a good milk supply earlier  o Becomes easier to recognize baby's feeding cues  o Infants have longer breastfeeding sessions  o Women who room-in with their newborns are more likely to exclusively breastfeed compared to women who are  from their newborns      Skin-to-Skin  o Skin to skin contact should happen regardless of a woman's feeding preference or the mode of delivery  o After the delivery of your baby, it's important that a healthy mother and her baby have uninterrupted skin-to-skin contact   o Skin to skin contact should occur immediately after birth for a least one-two hours and until after the first feeding for breastfeeding mothers  o Skin-to-skin contact means placing dried, unclothes newborns on their mother's bare chest, with warm blankets covering the baby's back  o All routine procedures such as maternal and  assessments can take place during skin-to-skin contact or can be delayed until after the sensitive period immediately after birth  1 Tapan Vidal would like to say Congratulations on your pregnancy! We are happy that you have chosen us to be your health care provider during your pregnancy  Your health, the health of your unborn child (children) and your family is important to us  Now, more than ever, your health will be most important to you  Your baby's growth and progress can be affected by how well you take care of yourself  It's a good idea to plan ahead  It is a known fact that women who receive care early in pregnancy and throughout their pregnancy have healthier babies  Visits will be scheduled for you throughout your pregnancy  It is your duty to keep your appointments and follow directions for your care  The number of visits will be decided by your doctor  Don't be afraid to ask questions  Talk with your nurses and providers about your plans for birth and any concerns you may have  Maternal Fetal Medicine (MFM) at [phone]   - 1 hour appointment, only 1 support person allowed, NO children     Blood work : Please complete prior to your H&P appointment  o You do NOT have to fast for any blood work unless instructed     - CBC, Blood type and antibody screen, Urinalysis, Random glucose level, HIV, Syphilis, Hepatitis B   History &Physical: H&P appointment   o Physical exam  o Pelvic exam: GC/CT testing  o If needed: Pap smear  - Routine prenatal   o Visits every 4 weeks until 28 weeks    Stay healthy during your pregnancy     Eat a variety of healthy foods  Healthy foods include fruits, vegetables, whole-grain breads, low-fat dairy foods, beans, lean meats, and fish  Drink liquids as directed  Ask how much liquid to drink each day and which liquids are best for you   Caffeine: It is not clear how caffeine affects pregnancy  Limit your intake of caffeine to avoid possible health problems  o Limit caffeine to less than 200 milligrams each day  - Caffeine may be found in coffee, tea, cola, sports drinks, and chocolate   Foods that contain mercury:  Mercury is naturally found in almost all types of fish and shellfish  Some types of fish absorb higher levels of mercury that can be harmful to an unborn baby  Eat only fish and shellfish that are low in mercury  o Limit your intake of fish to 2 servings each week  - Choose fish low in mercury such as canned light tuna, shrimp, crab, salmon, cod, or tilapia   Do not  eat fish high in mercury such as swordfish, tilefish, licha mackerel, and shark   - Each week, you may eat up to 12 ounces of fish or shellfish that have low levels of mercury  These include shrimp, canned light tuna, salmon, pollock, and catfish    - Eat only 6 ounces of albacore (white) tuna per week  Albacore tuna has more mercury than canned tuna   Take prenatal vitamins as directed  Your need for certain vitamins and minerals, such as folic acid, increases during pregnancy  Prenatal vitamins provide some of the extra vitamins and minerals you need  Prenatal vitamins may also help to decrease the risk of certain birth defects   Weight: Ask how much weight you should gain during your pregnancy  Too much or too little weight gain can be unhealthy for you and your baby      Exercise: Talk to your healthcare provider about exercise  Moderate exercise can help you stay fit  Your healthcare provider will help you plan an exercise program that is safe for you during pregnancy  o Drink plenty of fluids while exercising to stay hydrated  Be careful to avoid overheating  o AVOID exercises that can make you lose your balance    o Activities that can put your baby at risk i e  horseback riding, scuba diving, skiing or snowboarding  o After your first trimester, avoid exercises that require you to lay flat on your back  o AVOID exceeding a heart rate greater than 140 beats per minute  As long as you are able to hold a conversation while exercising your heart rate is likely acceptable   ALWAYS wear your seat belt   o The shoulder belt should lay across your chest (between your breasts) and away from your neck    o Secure the lap belt below your belly so that it fits snugly across your hips and pelvic bone   Perform Kegel exercise  o Imagine yourself stopping the flow of urine, hellen the muscles of your pelvic floor  Hold that contraction for 10 seconds and release  - They can be repeated 10-20 times per day   Do not smoke  If you smoke, it is never too late to quit  Smoking increases your risk of a miscarriage and other health problems during your pregnancy  Smoking can cause your baby to be born too early or weigh less at birth  Ask your healthcare provider for information if you need help quitting   Do not drink alcohol  Alcohol passes from your body to your baby through the placenta  It can affect your baby's brain development and cause fetal alcohol syndrome (FAS)  FAS is a group of conditions that causes mental, behavior, and growth problems  o Alcohol:  Do not drink alcohol during pregnancy  Alcohol can increase your risk of a miscarriage (losing your baby)   Never use illegal or street drugs (such as marijuana or cocaine) while you are pregnant  Safety tips:   Avoid hot tubs and saunas    Do not use a hot tub or sauna while you are pregnant, especially during your first trimester  Hot tubs and saunas may raise your baby's temperature and increase the risk of birth defects   Avoid toxoplasmosis  This is an infection caused by eating raw meat or being around infected cat feces  It can cause birth defects, miscarriages, and other problems  Wash your hands after you touch raw meat  Make sure any meat is well-cooked before you eat it  Avoid raw eggs and unpasteurized milk  Use gloves or ask someone else to clean your cat's litter box while you are pregnant   Ask your healthcare provider about travel  The most comfortable time to travel is during the second trimester  Ask your healthcare provider if you can travel after 36 weeks  You may not be able to travel in an airplane after 36 weeks  He may also recommend that you avoid long road trips  Pregnancy Diet  WHAT YOU NEED TO KNOW:   What is a healthy diet during pregnancy? A healthy diet during pregnancy is a meal plan that provides the amount of calories and nutrients you need during pregnancy  Your body needs extra calories and nutrients to support your growing baby  You need to gain the right amount of weight for a healthy baby and pregnancy  Babies born at a healthy weight have a lower risk of certain health problems at birth and later in life  A healthy diet may help you avoid gaining too much weight  Too much weight gain may cause problems for you during your pregnancy and delivery   What should I AVOID while I am pregnant?   o Raw and undercooked foods: You should not eat undercooked or raw meat, poultry, eggs, fish, or shellfish (shrimp, crab, lobster)  Cook leftover foods and ready-to-eat foods such as hot dogs until they are steaming hot    o Unpasteurized food:  Unpasteurized foods are foods that have not gone through the heating process (pasteurization) that destroys bacteria   You should not drink milk, juice, or cheese that has not been pasteurized  This includes Brie, feta, Camembert, blue, and [place] cheeses   Which foods can I eat while I am pregnant? Eat a variety of foods from each of the food groups listed below  Your dietitian will tell you how many servings you should have from each food group each day to get enough calories  The amount of calories you need depends on your daily activity, your weight before pregnancy, and current weight gain  Healthcare providers divide pregnancy into 3 periods of time called trimesters  In the first trimester, you usually do not need extra calories  In the second and third trimesters, most women should eat about 300 extra calories each day   What vitamin and mineral supplements may I need? Your healthcare provider will tell you if you need a supplement and the type you should take  Talk to your healthcare provider before you take any other kind of supplement, including herbal (natural) supplements  o Prenatal vitamins:  Eat a variety of healthy foods, even if you take a prenatal vitamin  If you forget to take your vitamin, do not take double the amount the next day    o Folic acid:  You need at least 189 mcg of folic acid each day before you get pregnant  Folic acid helps to form your baby's brain and spinal cord in early pregnancy  During pregnancy, your daily need for folic acid increases to about 600 mcg  Get folic acid each day by eating citrus fruits and juices, green leafy vegetables, liver, or dried beans  Folic acid is also added to foods such as breakfast cereals, bread products, flour, and pasta    o Iron:  Iron is a mineral the body needs to make hemoglobin, which is a part of red blood cells  Hemoglobin helps your blood carry oxygen from the lungs to the rest of your body  Foods that are good sources of iron are meat, poultry, fish, beans, spinach, and fortified cereals and breads   Your body will absorb iron better from non-meat sources if you have a source of vitamin C at the same time  Drink tea and coffee separately from iron-fortified foods and iron supplements  You need about 30 mg of iron each day during pregnancy  o Calcium and vitamin D:  Women who do not eat dairy products may need a calcium and vitamin D supplement  Talk to your healthcare provider about calcium supplements if you do not regularly eat good sources of calcium  The amount of calcium you need is about 1,300 mg if you are between 15and 25years old and 1,000 mg if you are 23to 48years old   What other healthy guidelines should I follow? o Vegetarians and vegans: If you are a vegetarian or vegan, get enough protein, vitamin B12, and iron during your pregnancy  Some non-meat sources of these nutrients are fortified cereals, nut butters, soy products (tofu and soymilk), nuts, grains, and legumes  These nutrients are also found in eggs and milk products  o Cravings: You may have cravings for certain foods during your pregnancy  Foods that are high in calories, fat, and sugar should not replace healthy food choices  Some women have cravings for unusual substances such as paz, dirt, laundry starch, ice, and chalk  This condition is called pica  These may lead to health problems such as anemia and cause other health problems  Nausea and Vomiting in Pregnancy  Nausea and vomiting in pregnancy  can happen any time of day  These symptoms usually start before the 9th week of pregnancy, and end by the 14th week (second trimester)  Some women can have nausea and vomiting for a longer time  These symptoms can affect some women throughout the entire pregnancy  Nausea and vomiting do not harm your baby  These symptoms can make it hard for you to do your daily activities   Seek care immediately if:   o You have signs of dehydration   Examples are dark yellow urine, dry mouth and lips, dry  skin, fast heartbeat, and urinating less than usual   o You have severe abdominal pain   o You feel too weak or dizzy to stand up   o You see blood in your vomit or bowel movements  o Contact your healthcare provider if:   o You vomit more than 4 times in 1 day   o You have not been able to keep liquids down for more than 1 day   o You lose more than 2 pounds   o You have a fever   o Your nausea and vomiting continue longer than 14 weeks    o You have questions or concerns about your condition or care  Treatment  for nausea and vomiting in pregnancy is usually not needed  Talk to your healthcare provider if your symptoms do not decrease with the changes suggested below  You may need vitamin B6 and medicine if these changes do not help, or your symptoms become severe  Nutrition changes you can make to manage nausea and vomiting:    Eat small meals throughout the day instead of 3 large meals  You may be more likely to have nausea and vomiting when your stomach is empty  Eat foods that are low in fat and high in protein  Examples are lean meat, beans, turkey, and chicken without the skin  Eat a small snack, such as crackers, dry cereal, or a small sandwich before you go to bed   Eat some crackers or dry toast before you get out of bed in the morning  Get out of bed slowly  Sudden movements could cause you to get dizzy and nauseated   Eat bland foods when you feel nauseated  Examples of bland foods include dry toast, dry cereal, plain pasta, white rice, and bread  Other bland foods include saltine crackers, bananas, gelatin, and pretzels  Avoid spicy, greasy, and fried foods  Avoid any other foods that make you feel nauseated   Drink liquids that contain ginger  Drink ginger ale made with real ginger or ginger tea made with fresh grated ginger  Ginger capsules or ginger candies may also help to decrease nausea and vomiting   Drink liquids between meals instead of with meals  Wait at least 30 minutes after you eat to drink liquids  Drink small amounts of liquids often throughout the day to prevent dehydration   Ask how much liquid you should drink each day   Other changes you can make to manage nausea and vomiting:    Avoid smells that bother you  Strong odors may cause nausea and vomiting to start, or make it worse  Take a short walk, turn on a fan, or try to sleep with the window open to get fresh air  When you are cooking, open windows to get rid of smells that may cause nausea   Do not brush your teeth right after you eat  if it makes you nauseated   Rest when you need to  Start activity slowly and work up to your usual routine as you start to feel better   Talk to your healthcare provider about your prenatal vitamins  Prenatal vitamins can cause nausea for some women  Try taking your prenatal vitamin at night or with a snack  If this change does not help, your healthcare provider may recommend a different type of vitamin   Do not use any medicines, vitamins, or supplements to manage your symptoms without asking your healthcare provider  Many medicines can harm an unborn baby   Light to moderate exercise  may help to decrease your symptoms  It may also help you to sleep better at night  Ask your healthcare provider about the best exercise plan for you  Breastfeeding    Benefits of breastfeeding  o Are less likely to develop allergies  o Have increased protection against bacterial meningitis  o Have fewer middle ear infections  o Have fewer learning disabilities  o Have fewer behavioral difficulties  o Have higher IQ's  o Have lower rates of respiratory illness  o Have lower obesity  o Are less likely to develop juvenile diabetes and some childhood cancers  o Are less likely to die from Sudden Infant Death Syndrome  o A healthier baby means fewer visits to the doctor and the pharmacy  o Breastfeeding is environmentally friendly  There is nothing to throw away    o Breastfeeding is free; formula can cost over $1000 for the first year of life     o There is less vaginal bleeding immediately after delivery and a faster return to your pre-pregnant size  - Mothers who breastfeed a lifetime total of 2 years have:   A 40% decreased risk of breast cancer    A decreased risk of ovarian cancer   Lower rates of osteoporosis, diabetes and heart disease   Importance of exclusive breastfeeding  o By providing the ideal food for the healthy growth and development of infants, exclusive  infants receive only breast milk    o Exclusive breastfeeding for the first 6 months is very important to improve an infant's health and reduce childhood illness   Exclusive breastfeeding for the first 6 months  o The American Academy of Pediatrics recommends exclusive breastfeeding for the first six months and continued breastfeeding with supplementation of solids for the next 6 months   Early initiation of breastfeeding  o Women who feed their infants within the first hour of birth is referred to as Nikolai Colby initiation of breastfeeding and ensures that the  receives colostrum  o Colostrum is rich in antibodies and essential nutrients   Rooming-In  o May stabilize 's breathing and heart rate  o Reduce crying  o Improve ability for  to maintain temperature and blood sugar levels  o Early stimulation of baby's immune system  o Calming effects  o Easier and faster bonding   o Rooming-in promotes getting to know your    o Rooming-in makes breastfeeding easier  o Women who room-in with their newborns make more milk and produce a good milk supply earlier  o Becomes easier to recognize baby's feeding cues  o Infants have longer breastfeeding sessions  o Women who room-in with their newborns are more likely to exclusively breastfeed compared to women who are  from their newborns   Skin-to-Skin  o Skin to skin contact should happen regardless of a woman's feeding preference or the mode of delivery    o After the delivery of your baby, it's important that a healthy mother and her baby have uninterrupted skin-to-skin contact   o Skin to skin contact should occur immediately after birth for a least one-two hours and until after the first feeding for breastfeeding mothers  o Skin-to-skin contact means placing dried, unclothes newborns on their mother's bare chest, with warm blankets covering the baby's back  o All routine procedures such as maternal and  assessments can take place during skin-to-skin contact or can be delayed until after the sensitive period immediately after birth  We are proud to offer extensive educational and support services to encourage mothers to breastfeed  This service offers information, education, and support for women who want to breast feed  Mothers can leave a message or breastfeeding question on the line anytime of the day  Nurses will respond within 72 hours  The Breast Feeding Answer Line is [phone] ([phone])  Please DO NOT leave urgent or emergent messages on this message line  Please DO contact your physician DIRECTLY if you have any urgent questions or concerns  In the event of a suspected emergency medical condition please CALL 911 or [address]      Attaching your baby at the Breast (English): [email or web address]    Attaching your baby at the Breast (Faroese):  [email or web address]        Coronavirus (COVID-19) pregnancy FAQs: Medical experts answer your questions  From [employer] cy  [email or web address]  bc (courtesy of [facility])  As of 3/18/20  By Ant Leigh 37  Medically reviewed by Society for Maternal-Fetal Medicine       We asked parents-to-be to send us their most pressing questions about coronavirus (COVID-19)   Among them: Is it still safe to deliver in a hospital? What if my ob-gyn has the virus? We sent those questions to the top docs at the Society for Maternal-Fetal Medicine  Here are their answers  The coronavirus (COVID-19) pandemic has been declared a national emergency in the [facility] by [employer]  Moms-to-be like you are concerned about everything from getting medicines to managing disruptions at work  But above and beyond any worry about lifestyle changes is a focus on your health and the impact of COVID-19 on your pregnancy  We asked obstetrics doctors who handle the most complicated pregnancy issues to answer your questions about the coronavirus  Here are their responses, provided by Dr Chucky Lozoya and her colleagues at the Society for [place] [employer]  Am I at more risk for COVID-19 if I'm pregnant? We don't know  Pregnancy does change your immune system in ways that might make you more susceptible to viral respiratory infections like COVID-19  If you become infected, you might also be at higher risk for more severe illness compared to the general population  Although this does not appear to be the case with COVID-19, based on limited data so far, a higher risk has been true for pregnant women with other coronavirus infections (SARS-CoV and MERS-CoV) and other viral respiratory infections, such as flu  It's important to take preventive actions to avoid infection, such as washing your hands often and avoiding people who are sick  How might coronavirus affect my pregnancy? Again, we don't know  Women with other coronavirus infections (such as SARS-CoV) did not have miscarriage or stillbirth at higher rates than the general population  We know that having other respiratory viral infections during pregnancy, such as flu, has been associated with problems like low birth weight and  birth  Also, having a high fever early in pregnancy may increase the risk of certain birth defects      Could I transmit coronavirus to my baby during pregnancy or delivery? We don't know whether you could transmit COVID-19 to your baby before or during delivery  However, among the few case studies of infants born to mothers with COVID-23 published in peer-reviewed literature, none of the infants tested positive for the virus  Also, there was no virus detected in samples of amniotic fluid or breast milk  There have been a few reports of newborns as young as a few days old with infection, suggesting that a mother can transmit the infection to her infant through close contact after delivery  There have been no reports of mother-to-baby transmission for other coronaviruses (MERS-CoV and SARS-CoV), although only limited information is available  Is it safe for me to deliver at a hospital where there have been COVID-19 cases? Yes  We know that COVID-19 is a very scary virus  The good news is that hospitals are taking great precautions to keep patients and healthcare providers safe  When a patient is even suspected to have COVID-19, they are placed in a negative pressure room  (Think of these rooms as vacuums that suck and filter the air so it's safe for the other people in the hospital)  This makes it possible for you to deliver at the hospital without putting you or your baby at risk  Hospitals are also implementing stricter visiting policies to keep patients safe  It's worth calling your hospital to check if there are any new regulations to be aware of  What plans should I make now in case the hospital system is overwhelmed when it's time for me to deliver? 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 What if I work in a school, the travel [address], or some other high-risk setting?   Healthcare facilities should take care to limit the exposure of pregnant employees to patients with confirmed or suspected COVID-19, just as they would with other infectious cases  If you continue working, be sure to follow the CDC's risk assessment and infection control guidelines  If you work in a school, travel [employer], or other high-risk setting, talk with your employer about what it's doing to protect employees and minimize infection risks  Wash your hands often  What if my OB gets COVID-19? If your doctor or midwife tests positive for COVID-19, they will need to be quarantined until they recover and are no longer at risk of transmitting the virus  In this case, you'll be assigned to another OB in your doctor's practice (or you may choose another practitioner yourself)  Ask your new OB or your doctor's office if you should self-quarantine or be tested for the virus  (It will depend on when you last saw your provider and when that person tested positive )    Should we hold off on trying to conceive because of COVID-19? At this time, there's no reason to hold off on trying to get pregnant, but the data we have is really limited  For example, we don't think the virus causes birth defects or increases your risk of miscarriage  But we don't know whether you could transmit COVID-19 to your baby before or during delivery  We also don't know if the virus lives in semen or can be sexually transmitted  We have a babymoon scheduled in the next few months - should we cancel? If you're planning to travel internationally or on a cruise, you should strongly consider canceling  At this time, the virus has reached more than 140 countries, and there are travel bans to [place], most of [place], and [place]  Places where large numbers of people gather are at highest risk, especially airports and cruise ships    If you're planning travel in the [place] , note that any travel setting increases your risk of exposure, and there may be travel bans even in [facility] by the time you're scheduled to go  Even if you're state is not blocked, you'll definitely want to avoid traveling to communities where the virus is spreading  To find out where these places are, check The [place] Times map based on CDC data  For the most current advice to help you avoid exposure, check the CDC's COVID-19 travel page  Will the hospital separate me from my  and keep the baby in quarantine? If you test positive for COVID-19 or have been exposed but have no symptoms, the CDC, [employer] of Obstetricians and Gynecologists, and the Society for [employer] all recommend that you be  from your baby to decrease the risk of transmission to the baby  This would last until you are no longer at risk of transmitting the virus  If you do not have COVID-19 and have not been exposed to the virus, the hospital will not separate you from your   My hospital is restricting visitors and only allowing one support person  If my support person leaves after the delivery, will they be allowed to come back? Every hospital has different policies  Contact your hospital or labor and delivery unit a week or so before delivery to get the most up-to-date restrictions  In general, if your support person needs to leave, they would be allowed back unless they knew they were exposed to COVID-19 after leaving your company  [facility] understands that the coronavirus (COVID-19) pandemic is an evolving story and that your questions will change over time  We'll continue asking moms and dads in our Community what they want to know, and we'll get the answers from experts to keep them - and you - informed and supported  My mom was planning to fly here to help me care for my new baby after delivery  Should I tell her not to come? Yes   If your mom is over 61 or has any serious chronic medical conditions (such as heart disease, lung disease, or diabetes), she is at higher risk of serious illness from COVID-19 and should avoid air travel  And remember that any travel setting increases a person's risk of exposure  So, it may be risky to have her around the baby after she has been traveling  For the most current advice on traveling, check the [facility]'s COVID-19 travel page  [facility] understands that the coronavirus pandemic is an evolving story and that your questions will change over time  We'll continue asking moms and dads in our Community what they want to know, and we'll get the answers from experts to keep them - and you - informed and supported  Warning signs during pregnancy  [phone]  Answering service during non-business hours     1  Vaginal bleeding  2  Sharp abdominal pain that does not go away  3  Fever (more than 100 4 and is not relieved by Tylenol)  4  Persistent vomiting lasting greater than 24 hours  5  Chest pain   6  Pain or burning when you urinate  7  Severe headache that doesn't resolve with Tylenol  8  Blurred vision or seeing spots in your vision  9  Sudden swelling of your face or hands  10  Redness, swelling or pain in a leg  11  A sudden weight gain in just a few days  12  Decrease in your baby's movement (after 28 weeks or the 6th month of pregnancy)  13  A loss of watery fluid from your vagina - can be a gush, a trickle or continuous wetness  14  After 20 weeks of pregnancy, rhythmic cramping (greater than 4 per hour) or menstrual like low/pelvic pain  15  You have cravings for substances such as paz, dirt, laundry starch, or ice  Medications and Pregnancy  The following list of over-the-counter medications is usually considered safe to take during pregnancy  Take care to not double up on products containing acetaminophen (Tylenol)     Colds/Sore Throat   Robitussin DM - Plain (guaifenesin)   Saline nasal spray   Warm salt water gargle   Cepacol throat lozenges or mouthwash (cetylpyridinium)   Sucrets (hexylresoricinol)  Allergy   AVOID the D - or DECONGESTANT   Claritin (loratadine)   Zrytec (cetirizine)   Allerga (fexofenadine)  Headaches / Aches and Pains   Tylenol (acetaminophen)  Do NOT exceed more than 3000 mg of Tylenol in a 24-hour period  Heartburn   Mylanta (aluminum hydroxide/simethicone, magnesium hydroxide)   Maalaox (aluminum magnesium hydroxide, magnesium hydroxide)   Tums (calcium carbonate)   Riopan (magaldrate)  Constipation   Colace (docusate sodium)   Surfak (docusate sodium)   MiraLAX   Glycerin suppositories   Fleets enema (sodium phosphate & sodium biphosphate)  Nausea/Vomiting   Vitamin B6 (pyridoxine) - May take 50 mg at bedtime, 25 mg in the morning, 25 mg in the afternoon   Unisom (doxylamine) - May use for nausea/vomiting - (cut a 25 mg tablet in half)  May cause drowsiness  Sleep   Benadryl (diphenhydramine) - Take 1-2 tablets as needed at bedtime   Unisom (doxylamine) 25 mg tablet - As needed at bedtime   Melatonin 5 mg tablet - As needed at bedtime    Generally the generic form of medicine is usually lower priced than the brand name form of the medicine  Talk to your healthcare provider before you take any medicines  Many medicines may harm your baby if you take them when you are pregnant  Do not take any medicines, vitamins, herbs, or supplements without first talking to your healthcare provider

## 2022-01-13 NOTE — PROGRESS NOTES
OB INTAKE INTERVIEW  Pt presents for OB intake  [id number]  OB History    Para Term  AB Living   4 1 1   2 1   SAB IAB Ectopic Multiple Live Births   2       1      # Outcome Date GA Lbr Karri/2nd Weight Sex Delivery Anes PTL Lv   4 Current            3 Term 19 40w4d  4080 g (8 lb 15 9 oz) M Vag-Spont EPI  GEETHA   2 SAB            1 SAB              Hx of  delivery prior to 36 weeks 6 days:  No   If yes, place a referral for cervical surveillance at 16 weeks  Last Menstrual Period:    Patient's last menstrual period was 10/11/2021  Ultrasound date: 21  9 weeks 0 days     Estimated Date of Delivery: None noted  22  by LMP        Last pap smear: due for pap smear     Current Issues:  Constipation :   Yes, using colace with improvement   Headaches :   Yes, using tylenol with good improvement   Cramping:  No  Spotting :   No  PICA cravings :  No  FOB Involved:   Yes  Planned pregnancy:  Yes    I have these concerns about this prenatal patient:     - COVID 19 infection in pregnancy ( 19)   - history of depression; history of PP depression saw therapist, tried medication & online support group  - history of 2 first trimester SABs      Interview education   [facility] Pregnancy Essentials reviewed and discussed    Baby and [address] Handout   [facility] MFM Handout   Discussed genetic testing   Prenatal lab work: Scripts printed and given to pt   Influenza vaccine- had    COVID vaccine and booster - had    Discussed Tdap vaccine      Items provided electronically via AVS      Immunizations:   Immunization History   Administered Date(s) Administered    COVID-19 PFIZER VACCINE 0 3 ML IM 2021, 2021, 2021    HPV Quadrivalent 2013    Hep B, adult 10/25/2018, 12/10/2018, 2019    INFLUENZA 2018, 2018, 2019, 2019, 2020, 10/05/2021    MMR 2019    Rabies 2017, 2017, 2017, 01/31/2017    Rabies, Intramuscular 01/17/2017, 01/20/2017, 01/24/2017, 01/31/2017    Tdap 11/22/2016, 01/29/2019     Depression Screening Follow-up Plan: Patient's depression screening was negative with an [place] score of  11  Continue regular follow-up with their psychologist/therapist/psychiatrist who is managing their mental health condition(s)   encouraged to call office with worsening symptoms, thoughts of self harm and harm to others    Nurse/Family Partnership- referral placed:  No   If yes, place referral for nurse family partnership       Tobacco Cessation Counseling: non-smoker      Infection Screening: Does the pt have a hx of MRSA? No  If yes- please follow MRSA protocol and obtain a nasal swab for MRSA culture  The patient was oriented to our practice and all questions were answered  Interviewed by: TARIQ Rios 01/14/22    Virtual Regular Visit    Verification of patient location:    Patient is located in the following state in which I hold an active license [place]      Assessment/Plan:    Problem List Items Addressed This Visit        Unprioritized    COVID-19 affecting pregnancy in first trimester - Primary      Other Visit Diagnoses     14 weeks gestation of pregnancy                   Reason for visit is   Chief Complaint   Patient presents with    Routine Prenatal Visit     OB Intake     Virtual Regular Visit        Encounter provider TARIQ Rios    Provider located at [address] [place] [address]  [facility] [address]  [address]  [address] [address]      Recent Visits  No visits were found meeting these conditions    Showing recent visits within past 7 days and meeting all other requirements  Today's Visits  Date Type Provider Dept   01/14/22 [address], [address] [facility] today's visits and meeting all other requirements  Future Appointments  No visits were found meeting these conditions  Showing future appointments within next 150 days and meeting all other requirements       The patient was identified by name and date of birth  Nancy Mejia was informed that this is a telemedicine visit and that the visit is being conducted through [address] [place] [address] [facility] [employer] and patient was informed that this is a secure, HIPAA-compliant platform  She agrees to proceed     My office door was closed  Other methods to assure confidentiality were taken  No one else was in the room  She acknowledged consent and understanding of privacy and security of the video platform  The patient has agreed to participate and understands they can discontinue the visit at any time  Patient is aware this is a billable service  James Jaquez is a 19 y o  female    HPI  OB intake     Past Medical History:   Diagnosis Date    Depression 2011    Psychiatric disorder     depression       Past Surgical History:   Procedure Laterality Date    SKIN GRAFT Left     left elbow    SKIN GRAFT      left elbow       Current Outpatient Medications   Medication Sig Dispense Refill    Prenatal Vit-Fe Fumarate-FA (PRENATAL VITAMIN PO)        No current facility-administered medications for this visit  Allergies   Allergen Reactions    Benadryl [Diphenhydramine] Hives       Review of Systems   Constitutional: Negative for chills, fatigue and fever  Respiratory: Negative  Cardiovascular: Negative  Genitourinary: Positive for menstrual problem  Video Exam    Vitals:    01/14/22 1357   Height: 5' 6 5" (1 689 m)       Physical Exam  Constitutional:       General: She is not in acute distress  Appearance: Normal appearance  She is not ill-appearing  Neurological:      Mental Status: She is alert and oriented to person, place, and time     Psychiatric:         Mood and Affect: Mood normal          Behavior: Behavior normal           I spent 18 minutes directly with the patient during this visit    VIRTUAL VISIT DISCLAIMER      Madhugabriela Theodore verbally agrees to participate in [employer]  Pt is aware that [employer] could be limited without vital signs or the ability to perform a full hands-on physical Nikky Wiggins understands she or the provider may request at any time to terminate the video visit and request the patient to seek care or treatment in person

## 2022-01-14 ENCOUNTER — TELEMEDICINE (OUTPATIENT)
Dept: OBGYN CLINIC | Facility: MEDICAL CENTER | Age: 22
End: 2022-01-14

## 2022-01-14 ENCOUNTER — OFFICE VISIT (OUTPATIENT)
Dept: INTERNAL MEDICINE CLINIC | Facility: CLINIC | Age: 22
End: 2022-01-14
Payer: COMMERCIAL

## 2022-01-14 VITALS
BODY MASS INDEX: 24.55 KG/M2 | WEIGHT: 156.4 LBS | SYSTOLIC BLOOD PRESSURE: 118 MMHG | HEIGHT: 67 IN | DIASTOLIC BLOOD PRESSURE: 80 MMHG | OXYGEN SATURATION: 100 % | TEMPERATURE: 97.6 F | HEART RATE: 119 BPM

## 2022-01-14 VITALS — HEIGHT: 67 IN | BODY MASS INDEX: 24.96 KG/M2 | WEIGHT: 159 LBS

## 2022-01-14 DIAGNOSIS — O98.511 COVID-19 AFFECTING PREGNANCY IN FIRST TRIMESTER: ICD-10-CM

## 2022-01-14 DIAGNOSIS — U07.1 COVID-19 AFFECTING PREGNANCY IN FIRST TRIMESTER: ICD-10-CM

## 2022-01-14 DIAGNOSIS — U07.1 COVID-19 AFFECTING PREGNANCY IN FIRST TRIMESTER: Primary | ICD-10-CM

## 2022-01-14 DIAGNOSIS — Z3A.14 14 WEEKS GESTATION OF PREGNANCY: Primary | ICD-10-CM

## 2022-01-14 DIAGNOSIS — Z00.00 PHYSICAL EXAM, ANNUAL: ICD-10-CM

## 2022-01-14 DIAGNOSIS — O98.511 COVID-19 AFFECTING PREGNANCY IN FIRST TRIMESTER: Primary | ICD-10-CM

## 2022-01-14 DIAGNOSIS — Z3A.14 14 WEEKS GESTATION OF PREGNANCY: ICD-10-CM

## 2022-01-14 DIAGNOSIS — D22.9 CHANGE IN MULTIPLE NEVI: ICD-10-CM

## 2022-01-14 PROBLEM — G47.00 INSOMNIA: Status: ACTIVE | Noted: 2017-07-25

## 2022-01-14 PROBLEM — F31.9 BIPOLAR DEPRESSION (HCC): Status: ACTIVE | Noted: 2019-05-29

## 2022-01-14 PROBLEM — F32.A DEPRESSIVE DISORDER: Status: ACTIVE | Noted: 2017-07-25

## 2022-01-14 PROCEDURE — 99395 PREV VISIT EST AGE 18-39: CPT | Performed by: INTERNAL MEDICINE

## 2022-01-14 PROCEDURE — PNV: Performed by: NURSE PRACTITIONER

## 2022-01-14 NOTE — PROGRESS NOTES
INTERNAL MEDICINE OFFICE VISIT  [facility] Internal Medicine- [place]    NAME: Noemi Melton  AGE: 19 y o  SEX: female    DATE OF ENCOUNTER: 1/14/2022    Assessment and Plan     1  14 weeks gestation of pregnancy  -she is almost 14 weeks pregnant at this point  She had initial telemedicine visit with Obstetrics and has a follow-up appointment at the end of the month  -Routine pregnancy care and lab work per Obstetrics  Will check TSH  History of hypothyroidism in her mother    - TSH, 3rd generation with Free T4 reflex; Future    2  COVID-19 affecting pregnancy in first trimester  -recent COVID diagnosis in December  Thankfully, symptoms are improved  She still has occasional headache and shortness of breath    3  Change in multiple nevi  -was previously addressed at appointment on 11/05  Has not followed up with Dermatology yet due to pregnancy, recent bout of COVID  -Can follow up with derm when able    4  Physical exam, annual            No orders of the defined types were placed in this encounter  Chief Complaint     Chief Complaint   Patient presents with    Annual Exam       History of Present Illness     [facility] comes in today for annual physical   No major concerns today  She is almost 14 weeks pregnant at this point    Prior symptoms from [place] are essentially resolved    The following portions of the patient's history were reviewed and updated as appropriate: allergies, current medications, past family history, past medical history, past social history, past surgical history and problem list     Review of Systems     10 point ROS negative except per HPI    Active Problem List     Patient Active Problem List   Diagnosis    COVID-19 affecting pregnancy in first trimester    Bipolar depression ([facility] [address] )    Depressive disorder    Insomnia       Objective     /80 (BP Location: Left arm, Patient Position: Sitting)   Pulse (!) 119   Temp 97 6 °F (36 4 °C)   Ht 5' 6 5" (1 689 m)   Wt 70 9 kg (156 lb 6 4 oz)   LMP 10/11/2021   SpO2 100%   BMI 24 87 kg/m²     Physical Exam  Constitutional:       Appearance: Normal appearance  She is not ill-appearing  HENT:      Head: Normocephalic and atraumatic  Eyes:      General: No scleral icterus  Right eye: No discharge  Left eye: No discharge  Cardiovascular:      Rate and Rhythm: Normal rate and regular rhythm  Heart sounds: No murmur heard  No friction rub  Pulmonary:      Effort: Pulmonary effort is normal       Breath sounds: Normal breath sounds  No wheezing or rales  Abdominal:      General: Abdomen is flat  There is no distension  Palpations: Abdomen is soft  Tenderness: There is no abdominal tenderness  Musculoskeletal:         General: No swelling or tenderness  Skin:     General: Skin is warm and dry  Findings: No erythema  Neurological:      Mental Status: She is alert  Mental status is at baseline  Motor: No weakness  Psychiatric:         Mood and Affect: Mood normal          Behavior: Behavior normal          Pertinent Laboratory/Diagnostic Studies:  US OB < 14 weeks with transvaginal    Result Date: 12/14/2021  Impression: Single live intrauterine gestation at 9 weeks 0 days (range +/- 6)  LUANNE of 7/19/2022   Workstation performed: [id number]       Images and diagnostics reviewed     Current Medications     Current Outpatient Medications:     Prenatal Vit-Fe Fumarate-FA (PRENATAL VITAMIN PO), , Disp: , Rfl:     Health Maintenance     Health Maintenance   Topic Date Due    HPV Vaccine (2 - 2-dose series) 07/01/2013    Chlamydia Screening  Never done    Cervical Cancer Screening  Never done    Annual Physical  01/04/2022    BMI: Adult  01/14/2023    DTaP,Tdap,and Td Vaccines (3 - Td or Tdap) 01/29/2029    HIV Screening  Completed    Hepatitis C Screening  Completed    Influenza Vaccine  Completed    COVID-19 Vaccine  Completed    Pneumococcal Vaccine: Pediatrics (0 to 5 Years) and At-Risk Patients (6 to 59 Years)  Aged Out    HIB Vaccine  Aged Out    Hepatitis B Vaccine  Aged Out    IPV Vaccine  Aged Out    Hepatitis A Vaccine  Aged Out    Meningococcal ACWY Vaccine  Aged Out     Immunization History   Administered Date(s) Administered    COVID-19 PFIZER VACCINE 0 3 ML IM 01/08/2021, 01/30/2021, 11/03/2021    HPV Quadrivalent 01/01/2013    Hep B, adult 10/25/2018, 12/10/2018, 04/19/2019    INFLUENZA 09/20/2018, 09/20/2018, 11/18/2019, 11/18/2019, 11/11/2020, 10/05/2021    MMR 08/26/2019    Rabies 01/17/2017, 01/20/2017, 01/24/2017, 01/31/2017    Rabies, Intramuscular 01/17/2017, 01/20/2017, 01/24/2017, 01/31/2017    Tdap 11/22/2016, 01/29/2019       LING Leonard    [facility] Internal Medicine - [place]  [address], [facility] [address]  [place], [address] [facility]  Office: [phone]

## 2022-01-14 NOTE — PROGRESS NOTES
ADULT ANNUAL [address] [place] [address],  [address] INTERNAL MEDICINE [place]    NAME: Ant Whaely  AGE: 19 y o  SEX: female  : 2000     DATE: 2022     Assessment and Plan:     Problem List Items Addressed This Visit     None          Immunizations and preventive care screenings were discussed with patient today  Appropriate education was printed on patient's after visit summary  Counseling:  · {Annual Physical; Counselin}         No follow-ups on file  Chief Complaint:     Chief Complaint   Patient presents with    Annual Exam      History of Present Illness:     Adult Annual Physical   Patient here for a comprehensive physical exam  The patient reports {problems:79228}  Diet and Physical Activity  · Diet/Nutrition: {annual physical; diet:}  · Exercise: {annual physical; exercise:2102}  Depression Screening  PHQ-2/9 Depression Screening         General Health  · Sleep: {annual physical; sleep:2102}  · Hearing: {annual physical; hearin}  · Vision: {annual physical; vision:}  · Dental: {annual physical; dental:}  /GYN Health  · Last menstrual period: ***  · Contraceptive method: {contraceptive options:}  · History of STDs?: {YES/NO:}       Review of Systems:     Review of Systems   Past Medical History:     Past Medical History:   Diagnosis Date    Depression 2011    History of transfusion     2014    Psychiatric disorder     depression    Varicella     vaccine      Past Surgical History:     Past Surgical History:   Procedure Laterality Date    SKIN GRAFT Left     left elbow    SKIN GRAFT      left elbow      Social History:     Social History     Socioeconomic History    Marital status: /Civil Union     Spouse name: Not on file    Number of children: Not on file    Years of education: Not on file    Highest education level: Not on file   Occupational History    Not on file   Tobacco Use    Smoking status: Never Smoker    Smokeless tobacco: Never Used   Vaping Use    Vaping Use: Never used   Substance and Sexual Activity    Alcohol use: Yes     Alcohol/week: 2 0 standard drinks     Types: 2 Glasses of wine per week    Drug use: No    Sexual activity: Yes     Partners: Male   Other Topics Concern    Not on file   Social History Narrative    Not on file     Social Determinants of Health     Financial Resource Strain: Not on file   Food Insecurity: Not on file   Transportation Needs: Not on file   Physical Activity: Not on file   Stress: Not on file   Social Connections: Not on file   Intimate Partner Violence: Not on file   Housing Stability: Not on file      Family History:     Family History   Problem Relation Age of Onset    Hypothyroidism Mother     Bipolar disorder Mother     Mental illness Mother     Thyroid disease Mother         Hypothyroidism    No Known Problems Father     Cancer Maternal Grandmother         Breast cancer    Heart disease Maternal Grandmother     Breast cancer Maternal Grandmother     Lung cancer Maternal Grandfather     Cancer Maternal Grandfather         Lung cancer    Emphysema Maternal Grandfather     Asperger's syndrome Brother     Addiction problem Brother     No Known Problems Son     No Known Problems Paternal Grandmother     No Known Problems Paternal Grandfather     Depression Half-Sister     No Known Problems Brother     Colon cancer Neg Hx     Ovarian cancer Neg Hx       Current Medications:     Current Outpatient Medications   Medication Sig Dispense Refill    Prenatal Vit-Fe Fumarate-FA (PRENATAL VITAMIN PO)        No current facility-administered medications for this visit  Allergies:      Allergies   Allergen Reactions    Benadryl [Diphenhydramine] Hives      Physical Exam:     Ht 5' 6 5" (1 689 m)   LMP 10/11/2021   BMI 25 28 kg/m²     Physical Exam     Richard Hugo Post   [facility] INTERNAL MEDICINE [place]

## 2022-01-26 NOTE — PATIENT INSTRUCTIONS
Thank you for visiting [facility]  You may be invited to complete a survey about you visit  Your responses will help us improve care we provide  A 10 means the care you received at your visit met your expectations  If you are unable to give a 10 please list reasons so we can work on improving your patient experience  First off, a study released 3/25/21 demonstrating positive vaccine-generated antibodies present in umbilical cord blood and breastmilk of vaccinated pregnant women  This is great news  The research group evaluated 131 reproductive-age women (84 pregnant, 31 lactating, and 16 non-pregnant) and evaluated several antibody types, measuring them in blood and breastmilk, at several time points (baseline, second vaccine dose, 2-6 weeks post second vaccine, and at delivery)  They also evaluated the cord blood of 10 babies  They compared these to women who had antibodies after coronavirus infection  Antibody levels were equivalent when pregnant and lactating women were compared to non-pregnant women, suggesting a good immune response in pregnancy and lactation  Antibody levels were higher after vaccination than after infection  Vaccine-generated antibodies were present in all cord blood and breastmilk samples  The link to the abstract can be found here:  [email or web address] [employer]/    Other resources are here:    1  The V-safe program   [employer]  V-safe is a smartphone-based tool that uses text messaging and web surveys to provide personalized health check-ins after you receive a COVID-19 vaccine  As of 4/5/21, a total of [id number],960 pregnant women have enrolled  2   The v-safe COVID-19 Vaccine Pregnancy Registry  [employer] au    This registry is for v-safe participants who report vaccination in the periconception period or during pregnancy  The registry activities are in addition to the v-safe after vaccination health check-ins that participants receive via text message  Pregnant participants in the registry will be contacted to answer questions about their pregnancy and medical history  Participants will also be asked for permission to contact their healthcare provider(s)  3   As an FYVIMAL, [employer]) is currently running a randomized placebo-controlled trial of pregnant women: https://clinicaltrials gov/ct2/show/CMH05451746        4   This study (COVID-PRICE): https://clinicaltrials gov/ct2/show/AXL38318233 is being conducted nearby ([facility])  It involves collection of maternal blood, cord blood, and breastmilk  We [facility] are not a study site, but if you are interested in participating, I can assist with connecting you with study staff  5   This study (C-VIPER) NotebookPreviews it is available though doesn't appear to have yet started recruiting  These are just studies listed through clinicaltrials gov  There are likely other studies available that may not be nationally registered  Lastly, here is a special video from two prominent [facility] physicians all about the vaccine in pregnancy: https://vimeo  [id number]        COVID-19 and Pregnancy   AMBULATORY CARE:   What you need to know about coronavirus disease 2019 (COVID-19) and pregnancy:  Pregnancy increases your risk for severe COVID-19 illness  COVID-19 can also lead to  delivery of your baby  Most babies who become infected with the new virus do not develop serious effects, but some do  It is important for you and your baby to stay safe during pregnancy and delivery  Signs and symptoms of COVID-19 in newborns: The following signs and symptoms may be from COVID-19, but they are also common in newborns   Your 's healthcare provider may recommend testing to confirm or rule out COVID-19  Your  may need a second test if the first is negative  · Fever    · Not moving arms or legs much, or being too sleepy to feed    · A runny nose or cough    · Fast breathing, or trouble breathing    · Vomiting, diarrhea, or not feeding well    If you think you, your baby, or someone in your home may be infected:  Do the following to protect others:  · If emergency care is needed,  tell the  about the possible infection, or call ahead and tell the emergency department  · Call a healthcare provider  for instructions if symptoms are mild  Anyone who may be infected should not  arrive without calling first  The provider will need to protect staff members and other patients  · The person who may be infected needs to wear a face covering  while getting medical care  This will help lower the risk of infecting others  Coverings are not used for anyone who is younger than 2 years, has breathing problems, or cannot remove it  The provider can give you instructions for anyone who cannot wear a covering  Call your local emergency number (911 in the [address] [place] [address],[address]) or go to the emergency department if:   · You have trouble breathing or shortness of breath at rest     · You have chest pain or pressure that lasts longer than 5 minutes  · You become confused or hard to wake  · Your lips or face are blue  · You have a fever of 104°F (40°C) or higher  Call your doctor if:   · You have signs or symptoms of COVID-19  Try to call within 24 hours of when you start to feel sick  · You do not  have symptoms of COVID-19 but had close physical contact within 14 days with someone who tested positive  · You have questions or concerns about your condition or care  How the 2019 coronavirus spreads: The virus spreads quickly and easily  The virus can be passed starting 2 days before symptoms begin or before a positive test if symptoms never begin   The following are ways the virus is thought to spread, but more information may be coming:  · Droplets are the main way all coronaviruses spread  The virus travels in droplets that form when a person talks, coughs, or sneezes  The droplets can also float in the air for minutes or hours  Infection happens when you breathe in the droplets or get them in your eyes or nose  Close personal contact with an infected person increases your risk for infection  This means being within 6 feet (2 meters) of the person for at least 15 minutes over 24 hours  · Person-to-person contact can spread the virus  For example, a person with the virus on his or her hands can spread it by shaking hands with someone  · The virus can stay on objects and surfaces for a short time  You may become infected by touching the object or surface and then touching your eyes or mouth  · An infected animal may be able to infect a person who touches it  This may happen at live markets or on a farm  Protect yourself and your baby while you are pregnant: If you have COVID-19 during your pregnancy, healthcare providers will monitor you and your baby closely  Work with your healthcare provider or obstetrician  If you do not have either, experts recommend you contact a local [facility] health center or health department  The best way to prevent infection is to avoid anyone who is infected, but this can be hard to do  An infected person can spread the virus before signs or symptoms develop, or even if signs or symptoms never develop  The following can help keep you and your baby safe:     · Wash your hands throughout the day  Use soap and water  Rub your soapy hands together, lacing your fingers  Wash the front and back of each hand, and in between your fingers  Use the fingers of one hand to scrub under the fingernails of the other hand  Wash for at least 20 seconds  Rinse with warm, running water for several seconds   Dry your hands with a clean towel or paper towel  Use hand  that contains alcohol if soap and water are not available  If you must go out, wash your hands before you leave your home and when you get home  Wash your hands after you put items away  Be careful about what you touch while you are out  · Protect yourself from sneezes and coughs  Turn your face away and cover your mouth and nose if you are around someone who is sneezing or coughing  This helps protect you from the person's droplets  Cover your mouth and nose with a tissue when you need to sneeze or cough  Use the bend of your arm if you do not have a tissue  Throw the tissue away  Then wash your hands or use hand   · Make a habit of not touching your face  If you get the virus on your hands, you can transfer it to your eyes, nose, or mouth and become infected  · Follow worldwide, national, and local social distancing guidelines  Social distancing means staying far enough away physically from others that the virus cannot spread from one person to another  If you must go out, avoid crowds and large gatherings  Gatherings or crowds of 10 or more individuals can cause the virus to spread  Avoid places such as love, beaches, sporting events, and tourist attractions  For events such as parties, holiday meals, Gnosticist services, and conferences, attend virtually (on a computer), if possible  · Wear a face covering (mask) around anyone who does not live in your home  A covering helps protect the person wearing it from being infected or passing the virus to others  Do not  wear a plastic face shield instead of a covering  You can use both together for extra protection  Use a disposable non-medical mask, or make a cloth covering with at least 2 layers  You can also create layers by putting a cloth covering over a disposable non-medical mask  Cover your mouth and your nose  Securely fasten it under your chin and on the sides of your face   A face covering is not a substitute for other safety measures  Continue social distancing and washing your hands often  Do not put a face shield or covering on your   These increase the risk for sudden infant death syndrome (SIDS)  · Stay at least 6 feet (2 meters) away from anyone who does not live in your home  Keep this distance every time you go out of your home and are around another person  Do not shake hands with, hug, or kiss a person as a greeting  Stand or walk as far from others as possible, especially around anyone who is sneezing or coughing  If you must use public transportation (such as a bus or subway), try to sit or stand away from others  Do not go to someone else's home unless it is necessary  Do not go over to visit, even if you are lonely, or the person is  Only go if you need to help him or her  · Stay safe if you must go out to work  Keep physical distance between you and other workers as much as possible  Follow your employer's rules so everyone stays safe  · Clean and disinfect high-touch surfaces and objects in your home often  Use disinfecting wipes or make a solution of 4 teaspoons of bleach in 1 quart (4 cups) of water  Clean surfaces and objects in the room where your baby will be sleeping, especially right before you give birth  Wash your hands after you clean and disinfect  Be careful with cleaning products  Read the labels to make sure they are safe to use during pregnancy  Open windows to make sure you have good ventilation  What you can do to have a healthy pregnancy during the COVID-19 outbreak:       · Keep all prenatal and  appointments  You may be able to have certain prenatal appointments without having to go into the provider's office  Some providers offer phone, video, or other types of appointments  You may also be able to get prescriptions for a few months at a time  This will help lower the number of trips you need to make to the pharmacy for refills   If you do need to go into your provider's office, take precautions  Put a face covering on before you go into the office  Do not stand or sit within 6 feet (2 meters) of anyone in the waiting room, if possible  Do not stand or sit near anyone who is not wearing a face covering  · Get recommended vaccines  Your healthcare provider can tell you if you need vaccines not listed below, and when to get them  ? COVID-19 vaccines are recommended for use during pregnancy  Talk to your obstetrician or doctor about the risks and benefits of getting a COVID-19 vaccine during pregnancy  The current vaccines are given as a shot in 1 or 2 doses  A third dose is recommended for adults with a weakened immune system who get a 2-dose vaccine  The third dose is given at least 28 days after the second  Even after you get the vaccine, continue wearing a face covering, handwashing, and social distancing  These are still the best ways to prevent infection  ? Get the influenza (flu) vaccine  Try to get the vaccine as soon as recommended, usually starting in September or October  ? Get the Tdap vaccine  The Tdap vaccine protects you from tetanus, diphtheria, and pertussis  If possible, get the vaccine during weeks 27 to 36 of your pregnancy  You should get a dose of Tdap with each pregnancy  · Take prenatal vitamins as directed  Your prenatal vitamins should contain folic acid  You need about 600 micrograms (mcg) of folic acid each day during pregnancy  Folic acid helps to form your baby's brain and spinal cord in early pregnancy  · Eat a variety of healthy foods  Healthy foods are important, even if you take a prenatal vitamin  Healthy foods contain nutrients that help keep your immune system strong  Examples of healthy foods include vegetables, fruits, whole-grain breads and cereals, lean meats and poultry, fish, low-fat dairy products, and cooked beans  Do not have raw, undercooked, or unpasteurized food or drinks  Unpasteurized foods are foods that have not gone through the heating process (pasteurization) that destroys bacteria  Your healthcare provider or a dietitian can help you create healthy meal plans  · Talk to your healthcare provider about exercise  Moderate exercise can help keep your immune system strong  Your healthcare provider can help you plan an exercise program that is safe for you during pregnancy  You may need to exercise at home if you cannot exercise outdoors, such as walking in a park  If you want to do pregnancy yoga or other group activities, be safe  Stay at least 6 feet (2 meters) away from others in the class, and the instructor  Wash your hands before you leave the building  Follow the facility's instructions for preventing infections  · Try to lower your stress  You may be feeling more stressed than usual because of the COVID-19 outbreak  You may also feel stress from not being able to share your pregnancy with others  For example, you may not be able to have someone with you during prenatal visits or ultrasounds  Talk to your healthcare providers about ways to manage stress during this time  Pick 1 or 2 times a day to watch the news  Constant news watching about COVID-19 can increase your stress levels  Set a sleep schedule to go to bed and wake up at the same times each day  · Do not smoke cigarettes, drink alcohol, or use drugs  Nicotine and other chemicals in cigarettes and cigars can harm your baby and your health  Alcohol can increase your risk for a miscarriage  Your baby may also be born too small or have other health problems  Certain drugs can be passed to your baby before he or she is born  Some can be passed through breast milk  It is best to quit cigarettes, alcohol, and drugs before you become pregnant and not start again after your baby is born  Ask your healthcare provider for information if you currently use any of these and need help to quit      Protect your  during delivery and while you are in the hospital:  It is not known for sure if an unborn baby can be infected with the virus that causes COVID-19  Some newborns have tested positive for the virus  The newborns may have been infected before, during, or after birth  The greatest risk is for a  to be in close contact with an infected person  Your baby may be tested for the virus soon after being born if you have COVID-23  He or she may be tested again before you leave the hospital  This depends on whether your baby has any signs or symptoms of COVID-19  You will be able to make choices for you and your baby during your hospital stay  Talk to healthcare providers about the following:  · Ask about temporary separation if you have COVID-19  Temporary separation means your  is moved to a different room from you  You will be able to make the decision if you want to do this  Separation will help lower your 's risk for being infected  You will still be able to give your  breast milk  You may need to pump the milk from your breasts  Someone who does not have COVID-19 will then feed the pumped milk to your   You may instead choose to have your baby brought to you when you want to breastfeed  Take precautions to keep your baby safe  Wash your hands and the skin around your nipples before you hold your baby  Wear a face covering while you breastfeed  · Be careful if you have COVID-19 and do not choose temporary separation  Healthcare providers will keep your  at least 6 feet (2 meters) away from you as much as possible  Your  may be placed in an incubator  The incubator will help protect your  from infection  Always wash your hands and put on a face covering when you hold, touch, or have close contact with your   · Ask about visitors  The facility may not be allowing any visitors to newborns during this time   If you are allowed visitors, you may need to limit how many you can have at a time  Do not allow anyone who has known or suspected COVID-19 to visit  Even without signs or symptoms, the person can infect your  or others in the room  All visitors need to wash their hands and put on clean face coverings before entering your room  The face covering needs to stay on during the whole visit  Do not let anyone take the face covering down to make faces at your baby, talk, sneeze, or cough  Do not let anyone kiss you or your baby  Protect your  at home:   · You can choose to continue temporary separation if you have COVID-19  You can do this if an adult who does not have COVID-19 can care for your   Your healthcare provider can give you instructions on how to do this safely at home  Only have close contact with your  when needed  Remember to wash your hands and put on a clean face covering first  You may need to continue pumping your breast milk  A healthy adult can feed the pumped breast milk to your   You may instead choose to have your baby brought to you when you want to breastfeed  Take precautions to keep your baby safe  Wash your hands and the skin around your nipples before you hold your baby  You will also need to wear a face covering while you breastfeed  · Use face coverings safely  Everyone who has COVID-19 needs to wear a clean face covering while being within 6 feet (2 meters) of your   This includes other children in your home who are 2 years or older  Do not put a face covering or plastic face shield on your   Any covering increases your 's risk for sudden infant death syndrome (SIDS)  Do not use coverings on children younger than 2 years or on anyone who has breathing problems or cannot remove it  · Be careful about visitors  Continue precautions you used in the hospital  Do not allow anyone who has known or suspected COVID-19 to come over to see your    Have visitors put on clean face coverings before they enter your home  Have them wash their hands as soon as they come in  The face covering needs to stay on during the whole visit  · Keep all checkup appointments  You may be able to have some appointments by phone or video meeting  Other appointments will need to be in person, such as for vaccines  Vaccines are normally given to babies at certain ages  Until COVID-19 is under control, your 's provider will give you a vaccine schedule  It is important for your  to get all recommended vaccines  What you need to know about breastfeeding:  Breastfeeding for the first 6 months decreases your baby's risk for respiratory (lung) infections, allergies, asthma, and stomach problems  Breast milk also helps your baby develop a strong immune system  Breast milk is considered safe, even if you have COVID-19  Experts currently believe the virus that causes COVID-19 does not spread in breast milk  Do the following to help protect your baby:  · Wash your hands before every breastfeeding or pumping session  Even if you do not have COVID-19, you can transfer the virus from your hands to your baby or the pump  Use soap and water to wash your hands whenever possible  Use hand  that contains alcohol if soap and water are not available  · Clean and sanitize your breast pump after each use  Follow the 's directions for cleaning and sanitizing the pump  It is important not to use it until it is clean and sanitized  · If you have COVID-19:      ? Wear a face covering while you breastfeed or pump  This will help prevent you from passing the virus through droplets when you talk, cough, sneeze, or laugh  The virus can stay on surfaces such as a breast pump for hours to days  ? Have someone who is not infected bottle feed your baby, if possible  Have the person wash his or her hands with soap and water before each feeding   The person can feed your  pumped breast milk or formula  Follow up with your doctor or obstetrician as directed:  Write down your questions so you remember to ask them during your visits  For more information:   · Centers for Disease Control and Prevention  [address] , [address]  Phone: 3- 920 - [phone]  Web Address: [employer]    © [facility] 2021 Information is for End User's use only and may not be sold, redistributed or otherwise used for commercial purposes  All illustrations and images included in CareNotes® are the copyrighted property of A D A M , Inc  or [facility] [employer]  The above information is an  only  It is not intended as medical advice for individual conditions or treatments  Talk to your doctor, nurse or pharmacist before following any medical regimen to see if it is safe and effective for you  Medications and Pregnancy  The following list of over-the-counter medications is usually considered safe to take during pregnancy  Take care to not double up on products containing acetaminophen (Tylenol)  Colds/Sore Throat   Robitussin DM - Plain (guaifenesin)   Saline nasal spray   Warm salt water gargle   Cepacol throat lozenges or mouthwash (cetylpyridinium)   Sucrets (hexylresoricinol)  Allergy   AVOID the D - or DECONGESTANT   Claritin (loratadine)   Zrytec (cetirizine)   Allerga (fexofenadine)  Headaches / Aches and Pains   Tylenol (acetaminophen)  Do NOT exceed more than 3000 mg of Tylenol in a 24-hour period    Heartburn   Mylanta (aluminum hydroxide/simethicone, magnesium hydroxide)   Maalaox (aluminum magnesium hydroxide, magnesium hydroxide)   Tums (calcium carbonate)   Riopan (magaldrate)  Constipation   Colace (docusate sodium)   Surfak (docusate sodium)   MiraLAX   Glycerin suppositories   Fleets enema (sodium phosphate & sodium biphosphate)  Nausea/Vomiting   Vitamin B6 (pyridoxine) - May take 50 mg at bedtime, 25 mg in the morning, 25 mg in the afternoon   Unisom (doxylamine) - May use for nausea/vomiting - (cut a 25 mg tablet in half)  May cause drowsiness  Sleep   Benadryl (diphenhydramine) - Take 1-2 tablets as needed at bedtime   Unisom (doxylamine) 25 mg tablet - As needed at bedtime   Melatonin 5 mg tablet - As needed at bedtime    Generally the generic form of medicine is usually lower priced than the brand name form of the medicine  Talk to your healthcare provider before you take any medicines  Many medicines may harm your baby if you take them when you are pregnant  Do not take any medicines, vitamins, herbs, or supplements without first talking to your healthcare provider  Pregnancy at 21 [address] 60:   What changes are happening with my body? Now that you are in your second trimester, you have more energy  You may also be feeling hungrier than usual  You may be gaining about ½ to 1 pound a week, and your pregnancy is beginning to show  You may need to start wearing maternity clothes  As your baby gets larger, you may have other symptoms  These may include body aches or stretch marks on your abdomen, breasts, thighs, or buttocks  How do I care for myself at this stage of my pregnancy? · Eat a variety of healthy foods  Healthy foods include fruits, vegetables, whole-grain breads, low-fat dairy foods, beans, lean meats, and fish  Drink liquids as directed  Ask how much liquid to drink each day and which liquids are best for you  Limit caffeine to less than 200 milligrams each day  Limit your intake of fish to 2 servings each week  Choose fish low in mercury such as canned light tuna, shrimp, salmon, cod, or tilapia  Do not  eat fish high in mercury such as swordfish, tilefish, licha mackerel, and shark  · Take prenatal vitamins as directed  Your need for certain vitamins and minerals, such as folic acid, increases during pregnancy   Prenatal vitamins provide some of the extra vitamins and minerals you need  Prenatal vitamins may also help to decrease the risk of certain birth defects  · Talk to your healthcare provider about exercise  Moderate exercise can help you stay fit  Your healthcare provider will help you plan an exercise program that is safe for you during pregnancy  · Do not smoke  Smoking increases your risk of a miscarriage and other health problems during your pregnancy  Smoking can cause your baby to be born too early or weigh less at birth  Ask your healthcare provider for information if you need help quitting  · Do not drink alcohol  Alcohol passes from your body to your baby through the placenta  It can affect your baby's brain development and cause fetal alcohol syndrome (FAS)  FAS is a group of conditions that causes mental, behavior, and growth problems  · Talk to your healthcare provider before you take any medicines  Many medicines may harm your baby if you take them when you are pregnant  Do not take any medicines, vitamins, herbs, or supplements without first talking to your healthcare provider  Never use illegal or street drugs (such as marijuana or cocaine) while you are pregnant  What are some safety tips during pregnancy? · Avoid hot tubs and saunas  Do not use a hot tub or sauna while you are pregnant, especially during your first trimester  Hot tubs and saunas may raise your baby's temperature and increase the risk of birth defects  · Avoid toxoplasmosis  This is an infection caused by eating raw meat or being around infected cat feces  It can cause birth defects, miscarriages, and other problems  Wash your hands after you touch raw meat  Make sure any meat is well-cooked before you eat it  Avoid raw eggs and unpasteurized milk  Use gloves or ask someone else to clean your cat's litter box while you are pregnant  What changes are happening with my baby?   By 22 weeks, your baby is about 8 inches long from the top of the head to the rump (baby's bottom)  Your baby also weighs about 1 pound  Your baby is becoming much more active  You may be able to feel the baby move inside you now  The first movements may not be that noticeable  They may feel like a fluttering sensation  As time goes on, your baby's movements will become stronger and more noticeable  What do I need to know about prenatal care? During the first 28 weeks of your pregnancy, you will see your healthcare provider once a month  Your healthcare provider will check your blood pressure and weight  You may also need the following:  · A urine test  may also be done to check for sugar and protein  These can be signs of gestational diabetes or infection  Protein in your urine may also be a sign of preeclampsia  Preeclampsia is a condition that can develop during week 20 or later of your pregnancy  It causes high blood pressure, and it can cause problems with your kidneys and other organs  · Fundal height  is a measurement of your uterus to check your baby's growth  This number is usually the same as the number of weeks that you have been pregnant  · A fetal ultrasound  shows pictures of your baby inside your uterus  It shows your baby's development  The movement and position of your baby can also be seen  Your healthcare provider may be able to tell you what your baby's gender is during the ultrasound  · Your baby's heart rate  will be checked  When should I seek immediate care? · You develop a severe headache that does not go away  · You have new or increased vision changes, such as blurred or spotted vision  · You have new or increased swelling in your face or hands  · You have vaginal spotting or bleeding  · Your water broke or you feel warm water gushing or trickling from your vagina  When should I call my doctor or obstetrician? · You have abdominal cramps, pressure, or tightening      · You have a change in vaginal discharge  · You cannot keep food or drinks down, and you are losing weight  · You have chills or a fever  · You have vaginal itching, burning, or pain  · You have yellow, green, white, or foul-smelling vaginal discharge  · You have pain or burning when you urinate, less urine than usual, or pink or bloody urine  · You have questions or concerns about your condition or care  CARE AGREEMENT:   You have the right to help plan your care  Learn about your health condition and how it may be treated  Discuss treatment options with your healthcare providers to decide what care you want to receive  You always have the right to refuse treatment  The above information is an  only  It is not intended as medical advice for individual conditions or treatments  Talk to your doctor, nurse or pharmacist before following any medical regimen to see if it is safe and effective for you  © Copyright [employer] 2021 Information is for End User's use only and may not be sold, redistributed or otherwise used for commercial purposes  All illustrations and images included in CareNotes® are the copyrighted property of [employer]  or [address] [place] [address]  Pregnancy at 15 to 18 [address] [place] [address]:   What changes are happening in my body? Now that you are in your second trimester, you have more energy  You may also feel hungrier than usual  You may start to experience other symptoms, such as heartburn or dizziness  You may be gaining about ½ to 1 pound a week, and your pregnancy is beginning to show  You may need to start wearing maternity clothes  How do I care for myself at this stage of my pregnancy? · Manage heartburn  by eating 4 or 5 small meals each day instead of large meals  Avoid spicy foods  Avoid eating right before bedtime  · Manage nausea and vomiting  Avoid fatty and spicy foods  Eat small meals throughout the day instead of large meals   Irene may help to decrease nausea  Ask your healthcare provider about other ways of decreasing nausea and vomiting  · Eat a variety of healthy foods  Healthy foods include fruits, vegetables, whole-grain breads, low-fat dairy foods, beans, lean meats, and fish  Drink liquids as directed  Ask how much liquid to drink each day and which liquids are best for you  Limit caffeine to less than 200 milligrams each day  Limit your intake of fish to 2 servings each week  Choose fish low in mercury such as canned light tuna, shrimp, salmon, cod, or tilapia  Do not  eat fish high in mercury such as swordfish, tilefish, licha mackerel, and shark  · Take prenatal vitamins as directed  Your need for certain vitamins and minerals, such as folic acid, increases during pregnancy  Prenatal vitamins provide some of the extra vitamins and minerals you need  Prenatal vitamins may also help to decrease the risk of certain birth defects  · Do not smoke  Smoking increases your risk of a miscarriage and other health problems during your pregnancy  Smoking can cause your baby to be born too early or weigh less at birth  Ask your healthcare provider for information if you need help quitting  · Do not drink alcohol  Alcohol passes from your body to your baby through the placenta  It can affect your baby's brain development and cause fetal alcohol syndrome (FAS)  FAS is a group of conditions that causes mental, behavior, and growth problems  · Talk to your healthcare provider before you take any medicines  Many medicines may harm your baby if you take them when you are pregnant  Do not take any medicines, vitamins, herbs, or supplements without first talking to your healthcare provider  Never use illegal or street drugs (such as marijuana or cocaine) while you are pregnant  What are some safety tips during pregnancy? · Avoid hot tubs and saunas    Do not use a hot tub or sauna while you are pregnant, especially during your first trimester  Hot tubs and saunas may raise your baby's temperature and increase the risk of birth defects  · Avoid toxoplasmosis  This is an infection caused by eating raw meat or being around infected cat feces  It can cause birth defects, miscarriages, and other problems  Wash your hands after you touch raw meat  Make sure any meat is well-cooked before you eat it  Avoid raw eggs and unpasteurized milk  Use gloves or ask someone else to clean your cat's litter box while you are pregnant  What changes are happening with my baby? By 18 weeks, your baby may be about 6 inches long from the top of the head to the rump (baby's bottom)  Your baby may weigh about 11 ounces  You may be able to feel your baby's movement at about 18 weeks or later  The first movements may not be that noticeable  They may feel like a fluttering sensation  Your baby also makes sucking movements and can hear certain sounds  What do I need to know about prenatal care? During the first 28 weeks of your pregnancy, you will see your healthcare provider once a month  Your healthcare provider will check your blood pressure and weight  You may also need any of the following:  · A urine test  may also be done to check for sugar and protein  These can be signs of gestational diabetes or infection  · A blood test  may be done to check for anemia (low iron level)  · Fundal height check  is a measurement of your uterus to check your baby's growth  This number is usually the same as the number of weeks that you have been pregnant  · An ultrasound  may be done to check your baby's development  Your healthcare provider may be able to tell you what your baby's gender is during the ultrasound  · Your baby's heart rate  will be checked  When should I seek immediate care? · You have pain or cramping in your abdomen or low back  · You have heavy vaginal bleeding or clotting      · You pass material that looks like tissue or large clots  Collect the material and bring it with you  When should I call my doctor or obstetrician? · You cannot keep food or drinks down, and you are losing weight  · You have light bleeding  · You have chills or a fever  · You have vaginal itching, burning, or pain  · You have yellow, green, white, or foul-smelling vaginal discharge  · You have pain or burning when you urinate, less urine than usual, or pink or bloody urine  · You have questions or concerns about your condition or care  CARE AGREEMENT:   You have the right to help plan your care  Learn about your health condition and how it may be treated  Discuss treatment options with your healthcare providers to decide what care you want to receive  You always have the right to refuse treatment  The above information is an  only  It is not intended as medical advice for individual conditions or treatments  Talk to your doctor, nurse or pharmacist before following any medical regimen to see if it is safe and effective for you  © Copyright [employer] 2021 Information is for End User's use only and may not be sold, redistributed or otherwise used for commercial purposes   All illustrations and images included in CareNotes® are the copyrighted property of A [employer] A [employer] , Inc  or [address] [place] [address] [employer]

## 2022-01-26 NOTE — PROGRESS NOTES
Prenatal H&P     Denies loss of fluid, vaginal discharge, vaginal bleeding  and abdominal pain  Was seen in office last week with complaints of abdominal pain  Patient states she is not having any pain today  Not feeling fetal movement  Tolerating PNV well  Prenatal panel reviewed WNL  Plans NIPT for genetic screening order was not found in chart  Patient encouraged to call  Center  Planned pregnancy        OB history:     G1- 1/10/16 4 week SAB     G2- 10/20/17 10 week SAB ; after abdominal trauma     G3- 19 term  male 8lb 31XK; denies complications      G4- current same paternity     Dating:     LMP - 10/11/21 LUANNE 22     US on 21 @  9w LUANNE 22  Working LUANNE 22     Surgical history:     Skin graft Left elbow/ leg (donor)      Medical History:     Insomnia, depression  and COIVD  (21)    Social history:     Alcohol/ tobacco/ illicit drug -denies x3     Denies history of STD/STI- chlamydia in HS      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, family and friends supportive     PCP/ Dental- last PE 22/ last cleaning 2019     SBIRT-screen positive at intake    She denies a hx of recent cough, chills, fever,  STD/STI, denies a personal history  of TB and Zika virus or close contacts with persons with TB or COVID -19  She denies a family history of inheritable conditions such as physical or intellectual disabilities, birth defects, blood disorders, heart or neural tube defects  She has never had MRSA  She denies recent travel or travel planned in the near future  Patient Plans   - Feeding breastfeeding  - Contraception OCPs  - [facility]ware office delivers at [facility]  If < 32 weeks will recommend evaluation at [address] [place] [address] St:  - Continue prenatal vitamin daily  - Genetic screening-would like NIPT encouraged to call  center    Reviewed option for MS AFP patient declines at today's visit  - [address]  [employer] level 2 US 3/4/22   - COVID 19 In early pregnancy 21  -history of depression encouraged to call office with worsening symptoms, thoughts of self-harm or harm to others  -  Weight gain in pregnancy- Who BMI recommend 25-35 lb   Healthy diet and daily exercise reviewed and encouraged  - Unit regimen reviewed visit every 4 weeks until 28 weeks, every 2 weeks until 36 weeks and then weekly until delivery  - Pap smear collected  Gonorrhea/chlamydia collected encouraged to complete TSH ordered by PCP    - Vaccines in Pregnancy      - TDAP vaccine after 27 gestational weeks     - Reviewed with patient  Pregnancy with COVID infection is considered  high risk and can have poor outcome including  delivery, more severe infection  therefore  pregnant patients are recommended to get  COVID-19 vaccination  Written information provided  Had COVID vaccine and booster  Also had COVID infection 2021     - influenza October- March had vaccine  -  Common discomforts of pregnancy and precautions reviewed  Signs and symptoms to report reviewed  Encouraged social distancing, good hand hygiene, masking, avoiding crowds and written information provided about COVID-19    RTO 4 weeks

## 2022-01-27 ENCOUNTER — ROUTINE PRENATAL (OUTPATIENT)
Dept: OBGYN CLINIC | Facility: MEDICAL CENTER | Age: 22
End: 2022-01-27

## 2022-01-27 ENCOUNTER — NURSE TRIAGE (OUTPATIENT)
Dept: OTHER | Facility: OTHER | Age: 22
End: 2022-01-27

## 2022-01-27 VITALS — WEIGHT: 156 LBS | SYSTOLIC BLOOD PRESSURE: 100 MMHG | BODY MASS INDEX: 24.8 KG/M2 | DIASTOLIC BLOOD PRESSURE: 58 MMHG

## 2022-01-27 DIAGNOSIS — F32.A DEPRESSION DURING PREGNANCY IN SECOND TRIMESTER: ICD-10-CM

## 2022-01-27 DIAGNOSIS — R10.9 ABDOMINAL PAIN DURING PREGNANCY IN SECOND TRIMESTER: Primary | ICD-10-CM

## 2022-01-27 DIAGNOSIS — Z3A.15 15 WEEKS GESTATION OF PREGNANCY: ICD-10-CM

## 2022-01-27 DIAGNOSIS — O26.892 ABDOMINAL PAIN DURING PREGNANCY IN SECOND TRIMESTER: Primary | ICD-10-CM

## 2022-01-27 DIAGNOSIS — O99.342 DEPRESSION DURING PREGNANCY IN SECOND TRIMESTER: ICD-10-CM

## 2022-01-27 PROCEDURE — PNV: Performed by: NURSE PRACTITIONER

## 2022-01-27 NOTE — PATIENT INSTRUCTIONS
Thank you for visiting [facility]  You may be invited to complete a survey about you visit  Your responses will help us improve care we provide  A 10 means the care you received at your visit met your expectations  If you are unable to give a 10 please list reasons so we can work on improving your patient experience  COVID-19 and Pregnancy   AMBULATORY CARE:   What you need to know about coronavirus disease 2019 (COVID-19) and pregnancy:  Pregnancy increases your risk for severe COVID-19 illness  COVID-19 can also lead to  delivery of your baby  Most babies who become infected with the new virus do not develop serious effects, but some do  It is important for you and your baby to stay safe during pregnancy and delivery  Signs and symptoms of COVID-19 in newborns: The following signs and symptoms may be from COVID-19, but they are also common in newborns  Your 's healthcare provider may recommend testing to confirm or rule out COVID-19  Your  may need a second test if the first is negative  · Fever    · Not moving arms or legs much, or being too sleepy to feed    · A runny nose or cough    · Fast breathing, or trouble breathing    · Vomiting, diarrhea, or not feeding well    If you think you, your baby, or someone in your home may be infected:  Do the following to protect others:  · If emergency care is needed,  tell the  about the possible infection, or call ahead and tell the emergency department  · Call a healthcare provider  for instructions if symptoms are mild  Anyone who may be infected should not  arrive without calling first  The provider will need to protect staff members and other patients  · The person who may be infected needs to wear a face covering  while getting medical care  This will help lower the risk of infecting others  Coverings are not used for anyone who is younger than 2 years, has breathing problems, or cannot remove it   The provider can give you instructions for anyone who cannot wear a covering  Call your local emergency number (911 in the [address]) or go to the emergency department if:   · You have trouble breathing or shortness of breath at rest     · You have chest pain or pressure that lasts longer than 5 minutes  · You become confused or hard to wake  · Your lips or face are blue  · You have a fever of 104°F (40°C) or higher  Call your doctor if:   · You have signs or symptoms of COVID-19  Try to call within 24 hours of when you start to feel sick  · You do not  have symptoms of COVID-19 but had close physical contact within 14 days with someone who tested positive  · You have questions or concerns about your condition or care  How the 2019 coronavirus spreads: The virus spreads quickly and easily  The virus can be passed starting 2 days before symptoms begin or before a positive test if symptoms never begin  The following are ways the virus is thought to spread, but more information may be coming:  · Droplets are the main way all coronaviruses spread  The virus travels in droplets that form when a person talks, coughs, or sneezes  The droplets can also float in the air for minutes or hours  Infection happens when you breathe in the droplets or get them in your eyes or nose  Close personal contact with an infected person increases your risk for infection  This means being within 6 feet (2 meters) of the person for at least 15 minutes over 24 hours  · Person-to-person contact can spread the virus  For example, a person with the virus on his or her hands can spread it by shaking hands with someone  · The virus can stay on objects and surfaces for a short time  You may become infected by touching the object or surface and then touching your eyes or mouth  · An infected animal may be able to infect a person who touches it  This may happen at live markets or on a farm      Protect yourself and your baby while you are pregnant: If you have COVID-19 during your pregnancy, healthcare providers will monitor you and your baby closely  Work with your healthcare provider or obstetrician  If you do not have either, experts recommend you contact a local [facility] health center or health department  The best way to prevent infection is to avoid anyone who is infected, but this can be hard to do  An infected person can spread the virus before signs or symptoms develop, or even if signs or symptoms never develop  The following can help keep you and your baby safe:     · Wash your hands throughout the day  Use soap and water  Rub your soapy hands together, lacing your fingers  Wash the front and back of each hand, and in between your fingers  Use the fingers of one hand to scrub under the fingernails of the other hand  Wash for at least 20 seconds  Rinse with warm, running water for several seconds  Dry your hands with a clean towel or paper towel  Use hand  that contains alcohol if soap and water are not available  If you must go out, wash your hands before you leave your home and when you get home  Wash your hands after you put items away  Be careful about what you touch while you are out  · Protect yourself from sneezes and coughs  Turn your face away and cover your mouth and nose if you are around someone who is sneezing or coughing  This helps protect you from the person's droplets  Cover your mouth and nose with a tissue when you need to sneeze or cough  Use the bend of your arm if you do not have a tissue  Throw the tissue away  Then wash your hands or use hand   · Make a habit of not touching your face  If you get the virus on your hands, you can transfer it to your eyes, nose, or mouth and become infected  · Follow worldwide, national, and local social distancing guidelines    Social distancing means staying far enough away physically from others that the virus cannot spread from one person to another  If you must go out, avoid crowds and large gatherings  Gatherings or crowds of 10 or more individuals can cause the virus to spread  Avoid places such as love, beaches, sporting events, and tourist attractions  For events such as parties, holiday meals, Muslim services, and conferences, attend virtually (on a computer), if possible  · Wear a face covering (mask) around anyone who does not live in your home  A covering helps protect the person wearing it from being infected or passing the virus to others  Do not  wear a plastic face shield instead of a covering  You can use both together for extra protection  Use a disposable non-medical mask, or make a cloth covering with at least 2 layers  You can also create layers by putting a cloth covering over a disposable non-medical mask  Cover your mouth and your nose  Securely fasten it under your chin and on the sides of your face  A face covering is not a substitute for other safety measures  Continue social distancing and washing your hands often  Do not put a face shield or covering on your   These increase the risk for sudden infant death syndrome (SIDS)  · Stay at least 6 feet (2 meters) away from anyone who does not live in your home  Keep this distance every time you go out of your home and are around another person  Do not shake hands with, hug, or kiss a person as a greeting  Stand or walk as far from others as possible, especially around anyone who is sneezing or coughing  If you must use public transportation (such as a bus or subway), try to sit or stand away from others  Do not go to someone else's home unless it is necessary  Do not go over to visit, even if you are lonely, or the person is  Only go if you need to help him or her  · Stay safe if you must go out to work  Keep physical distance between you and other workers as much as possible  Follow your employer's rules so everyone stays safe      · Clean and disinfect high-touch surfaces and objects in your home often  Use disinfecting wipes or make a solution of 4 teaspoons of bleach in 1 quart (4 cups) of water  Clean surfaces and objects in the room where your baby will be sleeping, especially right before you give birth  Wash your hands after you clean and disinfect  Be careful with cleaning products  Read the labels to make sure they are safe to use during pregnancy  Open windows to make sure you have good ventilation  What you can do to have a healthy pregnancy during the COVID-19 outbreak:       · Keep all prenatal and  appointments  You may be able to have certain prenatal appointments without having to go into the provider's office  Some providers offer phone, video, or other types of appointments  You may also be able to get prescriptions for a few months at a time  This will help lower the number of trips you need to make to the pharmacy for refills  If you do need to go into your provider's office, take precautions  Put a face covering on before you go into the office  Do not stand or sit within 6 feet (2 meters) of anyone in the waiting room, if possible  Do not stand or sit near anyone who is not wearing a face covering  · Get recommended vaccines  Your healthcare provider can tell you if you need vaccines not listed below, and when to get them  ? COVID-19 vaccines are recommended for use during pregnancy  Talk to your obstetrician or doctor about the risks and benefits of getting a COVID-19 vaccine during pregnancy  The current vaccines are given as a shot in 1 or 2 doses  A third dose is recommended for adults with a weakened immune system who get a 2-dose vaccine  The third dose is given at least 28 days after the second  Even after you get the vaccine, continue wearing a face covering, handwashing, and social distancing  These are still the best ways to prevent infection  ? Get the influenza (flu) vaccine    Try to get the vaccine as soon as recommended, usually starting in September or October  ? Get the Tdap vaccine  The Tdap vaccine protects you from tetanus, diphtheria, and pertussis  If possible, get the vaccine during weeks 27 to 36 of your pregnancy  You should get a dose of Tdap with each pregnancy  · Take prenatal vitamins as directed  Your prenatal vitamins should contain folic acid  You need about 600 micrograms (mcg) of folic acid each day during pregnancy  Folic acid helps to form your baby's brain and spinal cord in early pregnancy  · Eat a variety of healthy foods  Healthy foods are important, even if you take a prenatal vitamin  Healthy foods contain nutrients that help keep your immune system strong  Examples of healthy foods include vegetables, fruits, whole-grain breads and cereals, lean meats and poultry, fish, low-fat dairy products, and cooked beans  Do not have raw, undercooked, or unpasteurized food or drinks  Unpasteurized foods are foods that have not gone through the heating process (pasteurization) that destroys bacteria  Your healthcare provider or a dietitian can help you create healthy meal plans  · Talk to your healthcare provider about exercise  Moderate exercise can help keep your immune system strong  Your healthcare provider can help you plan an exercise program that is safe for you during pregnancy  You may need to exercise at home if you cannot exercise outdoors, such as walking in a park  If you want to do pregnancy yoga or other group activities, be safe  Stay at least 6 feet (2 meters) away from others in the class, and the instructor  Wash your hands before you leave the building  Follow the facility's instructions for preventing infections  · Try to lower your stress  You may be feeling more stressed than usual because of the COVID-19 outbreak  You may also feel stress from not being able to share your pregnancy with others   For example, you may not be able to have someone with you during prenatal visits or ultrasounds  Talk to your healthcare providers about ways to manage stress during this time  Pick 1 or 2 times a day to watch the news  Constant news watching about COVID-19 can increase your stress levels  Set a sleep schedule to go to bed and wake up at the same times each day  · Do not smoke cigarettes, drink alcohol, or use drugs  Nicotine and other chemicals in cigarettes and cigars can harm your baby and your health  Alcohol can increase your risk for a miscarriage  Your baby may also be born too small or have other health problems  Certain drugs can be passed to your baby before he or she is born  Some can be passed through breast milk  It is best to quit cigarettes, alcohol, and drugs before you become pregnant and not start again after your baby is born  Ask your healthcare provider for information if you currently use any of these and need help to quit  Protect your  during delivery and while you are in the hospital:  It is not known for sure if an unborn baby can be infected with the virus that causes COVID-19  Some newborns have tested positive for the virus  The newborns may have been infected before, during, or after birth  The greatest risk is for a  to be in close contact with an infected person  Your baby may be tested for the virus soon after being born if you have COVID-23  He or she may be tested again before you leave the hospital  This depends on whether your baby has any signs or symptoms of COVID-19  You will be able to make choices for you and your baby during your hospital stay  Talk to healthcare providers about the following:  · Ask about temporary separation if you have COVID-19  Temporary separation means your  is moved to a different room from you  You will be able to make the decision if you want to do this  Separation will help lower your 's risk for being infected   You will still be able to give your  breast milk  You may need to pump the milk from your breasts  Someone who does not have COVID-19 will then feed the pumped milk to your   You may instead choose to have your baby brought to you when you want to breastfeed  Take precautions to keep your baby safe  Wash your hands and the skin around your nipples before you hold your baby  Wear a face covering while you breastfeed  · Be careful if you have COVID-19 and do not choose temporary separation  Healthcare providers will keep your  at least 6 feet (2 meters) away from you as much as possible  Your  may be placed in an incubator  The incubator will help protect your  from infection  Always wash your hands and put on a face covering when you hold, touch, or have close contact with your   · Ask about visitors  The facility may not be allowing any visitors to newborns during this time  If you are allowed visitors, you may need to limit how many you can have at a time  Do not allow anyone who has known or suspected COVID-19 to visit  Even without signs or symptoms, the person can infect your  or others in the room  All visitors need to wash their hands and put on clean face coverings before entering your room  The face covering needs to stay on during the whole visit  Do not let anyone take the face covering down to make faces at your baby, talk, sneeze, or cough  Do not let anyone kiss you or your baby  Protect your  at home:   · You can choose to continue temporary separation if you have COVID-19  You can do this if an adult who does not have COVID-19 can care for your   Your healthcare provider can give you instructions on how to do this safely at home  Only have close contact with your  when needed  Remember to wash your hands and put on a clean face covering first  You may need to continue pumping your breast milk   A healthy adult can feed the pumped breast milk to your   You may instead choose to have your baby brought to you when you want to breastfeed  Take precautions to keep your baby safe  Wash your hands and the skin around your nipples before you hold your baby  You will also need to wear a face covering while you breastfeed  · Use face coverings safely  Everyone who has COVID-19 needs to wear a clean face covering while being within 6 feet (2 meters) of your   This includes other children in your home who are 2 years or older  Do not put a face covering or plastic face shield on your   Any covering increases your 's risk for sudden infant death syndrome (SIDS)  Do not use coverings on children younger than 2 years or on anyone who has breathing problems or cannot remove it  · Be careful about visitors  Continue precautions you used in the hospital  Do not allow anyone who has known or suspected COVID-19 to come over to see your   Have visitors put on clean face coverings before they enter your home  Have them wash their hands as soon as they come in  The face covering needs to stay on during the whole visit  · Keep all checkup appointments  You may be able to have some appointments by phone or video meeting  Other appointments will need to be in person, such as for vaccines  Vaccines are normally given to babies at certain ages  Until COVID-19 is under control, your 's provider will give you a vaccine schedule  It is important for your  to get all recommended vaccines  What you need to know about breastfeeding:  Breastfeeding for the first 6 months decreases your baby's risk for respiratory (lung) infections, allergies, asthma, and stomach problems  Breast milk also helps your baby develop a strong immune system  Breast milk is considered safe, even if you have COVID-19  Experts currently believe the virus that causes COVID-19 does not spread in breast milk   Do the following to help protect your baby:  · Wash your hands before every breastfeeding or pumping session  Even if you do not have COVID-19, you can transfer the virus from your hands to your baby or the pump  Use soap and water to wash your hands whenever possible  Use hand  that contains alcohol if soap and water are not available  · Clean and sanitize your breast pump after each use  Follow the 's directions for cleaning and sanitizing the pump  It is important not to use it until it is clean and sanitized  · If you have COVID-19:      ? Wear a face covering while you breastfeed or pump  This will help prevent you from passing the virus through droplets when you talk, cough, sneeze, or laugh  The virus can stay on surfaces such as a breast pump for hours to days  ? Have someone who is not infected bottle feed your baby, if possible  Have the person wash his or her hands with soap and water before each feeding  The person can feed your  pumped breast milk or formula  Follow up with your doctor or obstetrician as directed:  Write down your questions so you remember to ask them during your visits  For more information:   · Centers for Disease Control and Prevention  [address] , [address]  Phone: 5- 703 - 817-5413  Web Address: [employer]    © [address] [place] [address]  Information is for End User's use only and may not be sold, redistributed or otherwise used for commercial purposes  All illustrations and images included in CareNotes® are the copyrighted property of [employer] D A M [employer] Inc  or [address] [place] [address]  The above information is an  only  It is not intended as medical advice for individual conditions or treatments  Talk to your doctor, nurse or pharmacist before following any medical regimen to see if it is safe and effective for you  Pregnancy at 15 to 18 Weeks   [address]:   What changes are happening in my body?   Now that you are in your second trimester, you have more energy  You may also feel hungrier than usual  You may start to experience other symptoms, such as heartburn or dizziness  You may be gaining about ½ to 1 pound a week, and your pregnancy is beginning to show  You may need to start wearing maternity clothes  How do I care for myself at this stage of my pregnancy? · Manage heartburn  by eating 4 or 5 small meals each day instead of large meals  Avoid spicy foods  Avoid eating right before bedtime  · Manage nausea and vomiting  Avoid fatty and spicy foods  Eat small meals throughout the day instead of large meals  Irene may help to decrease nausea  Ask your healthcare provider about other ways of decreasing nausea and vomiting  · Eat a variety of healthy foods  Healthy foods include fruits, vegetables, whole-grain breads, low-fat dairy foods, beans, lean meats, and fish  Drink liquids as directed  Ask how much liquid to drink each day and which liquids are best for you  Limit caffeine to less than 200 milligrams each day  Limit your intake of fish to 2 servings each week  Choose fish low in mercury such as canned light tuna, shrimp, salmon, cod, or tilapia  Do not  eat fish high in mercury such as swordfish, tilefish, licha mackerel, and shark  · Take prenatal vitamins as directed  Your need for certain vitamins and minerals, such as folic acid, increases during pregnancy  Prenatal vitamins provide some of the extra vitamins and minerals you need  Prenatal vitamins may also help to decrease the risk of certain birth defects  · Do not smoke  Smoking increases your risk of a miscarriage and other health problems during your pregnancy  Smoking can cause your baby to be born too early or weigh less at birth  Ask your healthcare provider for information if you need help quitting  · Do not drink alcohol  Alcohol passes from your body to your baby through the placenta   It can affect your baby's brain development and cause fetal alcohol syndrome (FAS)  FAS is a group of conditions that causes mental, behavior, and growth problems  · Talk to your healthcare provider before you take any medicines  Many medicines may harm your baby if you take them when you are pregnant  Do not take any medicines, vitamins, herbs, or supplements without first talking to your healthcare provider  Never use illegal or street drugs (such as marijuana or cocaine) while you are pregnant  What are some safety tips during pregnancy? · Avoid hot tubs and saunas  Do not use a hot tub or sauna while you are pregnant, especially during your first trimester  Hot tubs and saunas may raise your baby's temperature and increase the risk of birth defects  · Avoid toxoplasmosis  This is an infection caused by eating raw meat or being around infected cat feces  It can cause birth defects, miscarriages, and other problems  Wash your hands after you touch raw meat  Make sure any meat is well-cooked before you eat it  Avoid raw eggs and unpasteurized milk  Use gloves or ask someone else to clean your cat's litter box while you are pregnant  What changes are happening with my baby? By 18 weeks, your baby may be about 6 inches long from the top of the head to the rump (baby's bottom)  Your baby may weigh about 11 ounces  You may be able to feel your baby's movement at about 18 weeks or later  The first movements may not be that noticeable  They may feel like a fluttering sensation  Your baby also makes sucking movements and can hear certain sounds  What do I need to know about prenatal care? During the first 28 weeks of your pregnancy, you will see your healthcare provider once a month  Your healthcare provider will check your blood pressure and weight  You may also need any of the following:  · A urine test  may also be done to check for sugar and protein  These can be signs of gestational diabetes or infection      · A blood test  may be done to check for anemia (low iron level)  · Fundal height check  is a measurement of your uterus to check your baby's growth  This number is usually the same as the number of weeks that you have been pregnant  · An ultrasound  may be done to check your baby's development  Your healthcare provider may be able to tell you what your baby's gender is during the ultrasound  · Your baby's heart rate  will be checked  When should I seek immediate care? · You have pain or cramping in your abdomen or low back  · You have heavy vaginal bleeding or clotting  · You pass material that looks like tissue or large clots  Collect the material and bring it with you  When should I call my doctor or obstetrician? · You cannot keep food or drinks down, and you are losing weight  · You have light bleeding  · You have chills or a fever  · You have vaginal itching, burning, or pain  · You have yellow, green, white, or foul-smelling vaginal discharge  · You have pain or burning when you urinate, less urine than usual, or pink or bloody urine  · You have questions or concerns about your condition or care  CARE AGREEMENT:   You have the right to help plan your care  Learn about your health condition and how it may be treated  Discuss treatment options with your healthcare providers to decide what care you want to receive  You always have the right to refuse treatment  The above information is an  only  It is not intended as medical advice for individual conditions or treatments  Talk to your doctor, nurse or pharmacist before following any medical regimen to see if it is safe and effective for you  © Copyright [employer] 2021 Information is for End User's use only and may not be sold, redistributed or otherwise used for commercial purposes   All illustrations and images included in CareNotes® are the copyrighted property of A D A M , Inc  or [employer]

## 2022-01-27 NOTE — LETTER
January 27, 2022     Patient: Jacquelyne Epley   YOB: 2000   Date of Visit: 1/27/2022       To Whom it May Concern:    Efren Garcia is under my professional care  She was seen in my office on 1/27/2022  She may return to work on  1/28/22  If you have any questions or concerns, please don't hesitate to call           Sincerely,          TARIQ Saldivar        CC: No Recipients

## 2022-01-27 NOTE — TELEPHONE ENCOUNTER
Regarding: 15 weeks pregnant, sharp pain, nausea   ----- Message from Negro Hernandez RN sent at 1/27/2022  8:05 AM EST -----  "I am 15 weeks pregnant and I am having a lot of pain "

## 2022-01-27 NOTE — PROGRESS NOTES
PN Problem   Denies loss of fluid and vaginal bleeding  Patient complaining of bilateral lower abdominal pain since last night  Pain is described as cramping with intermittent sharp shooting  Alleviating factors include remaining still  Aggravating factors include moving or walking  Admits to constipation took a stool softener last night most recent bowel movement this morning  Denies trauma, activity  Has not taken any OTC remedies  BP:100/58  Plan:  -continue prenatal vitamins daily  -keep appointment for H&P 1/31/22  -transvaginal ultrasound by Dr Jonny Fernandez - WNL   -reassured patient most likely round ligament pain conservative measures reviewed including belly band, slow transitions from sitting to standing, Tylenol as needed  -common discomforts of pregnancy and precautions reviewed  Signs and symptoms to report reviewed    RTO 1/31/22 for H&P

## 2022-01-27 NOTE — TELEPHONE ENCOUNTER
Tiger connect message sent to the on call provider regarding patient's symptoms  The patient was informed the office will call to schedule an appointment per the on call provider  Reason for Disposition   MODERATE-SEVERE abdominal pain (e g , interferes with normal activities, awakens from sleep)    Answer Assessment - Initial Assessment Questions  1  LOCATION: "Where does it hurt?"       Lower abdominal pain "I keep getting the round ligament pain but I am also getting it in the center as well "   2  RADIATION: "Does the pain shoot anywhere else?" (e g , chest, back, shoulder)      Denies   3  ONSET: "When did the pain begin?" (e g , minutes, hours or days ago)       Last night   4  ONSET: "Gradual or sudden onset?"      Gradual "It started just like every once in a while when I was walking and then it was when I wasn't walking  Now it is when I am sitting "   5  PATTERN "Does the pain come and go, or has it been constant since it started?"       Comes and goes   6  SEVERITY: "How bad is the pain?" "What does it keep you from doing?"  (e g , Scale 1-10; mild, moderate, or severe)    - MILD (1-3): doesn't interfere with normal activities, abdomen soft and not tender to touch     - MODERATE (4-7): interferes with normal activities or awakens from sleep, tender to touch     - SEVERE (8-10): excruciating pain, doubled over, unable to do any normal activities      7/10 moderate   7  RECURRENT SYMPTOM: "Have you ever had this type of stomach pain before?" If Yes, ask: "When was the last time?" and "What happened that time?"       "Kind of  A lot of the pain felt like the cramping pain you get when you're in labor "   8  CAUSE: "What do you think is causing the stomach pain?"      Unknown   9  RELIEVING/AGGRAVATING FACTORS: "What makes it better or worse?" (e g , antacids, bowel movement, movement)      "Laying down flat on my back made it better "  10   OTHER SYMPTOMS: "Has there been any vaginal bleeding, fever, vomiting, diarrhea, or urine problems?"       Nausea, constipation, yellow vaginal discharge   11   LUANNE: "What date are you expecting to deliver?"        7/18/22    Patient fell about 4-5 days ago "I tripped over some laundry and fell on my hands and knees "    Protocols used: PREGNANCY - ABDOMINAL PAIN LESS THAN 20 WEEKS EGA-ADULT-

## 2022-01-31 ENCOUNTER — INITIAL PRENATAL (OUTPATIENT)
Dept: OBGYN CLINIC | Facility: MEDICAL CENTER | Age: 22
End: 2022-01-31

## 2022-01-31 VITALS — BODY MASS INDEX: 25.44 KG/M2 | WEIGHT: 160 LBS | DIASTOLIC BLOOD PRESSURE: 60 MMHG | SYSTOLIC BLOOD PRESSURE: 108 MMHG

## 2022-01-31 DIAGNOSIS — Z3A.16 16 WEEKS GESTATION OF PREGNANCY: ICD-10-CM

## 2022-01-31 DIAGNOSIS — Z12.4 SCREENING FOR CERVICAL CANCER: ICD-10-CM

## 2022-01-31 DIAGNOSIS — Z11.3 ROUTINE SCREENING FOR STI (SEXUALLY TRANSMITTED INFECTION): ICD-10-CM

## 2022-01-31 DIAGNOSIS — O98.511 COVID-19 AFFECTING PREGNANCY IN FIRST TRIMESTER: Primary | ICD-10-CM

## 2022-01-31 DIAGNOSIS — F32.A DEPRESSION AFFECTING PREGNANCY IN SECOND TRIMESTER, ANTEPARTUM: ICD-10-CM

## 2022-01-31 DIAGNOSIS — U07.1 COVID-19 AFFECTING PREGNANCY IN FIRST TRIMESTER: Primary | ICD-10-CM

## 2022-01-31 DIAGNOSIS — O99.342 DEPRESSION AFFECTING PREGNANCY IN SECOND TRIMESTER, ANTEPARTUM: ICD-10-CM

## 2022-01-31 PROCEDURE — 87591 N.GONORRHOEAE DNA AMP PROB: CPT | Performed by: NURSE PRACTITIONER

## 2022-01-31 PROCEDURE — G0145 SCR C/V CYTO,THINLAYER,RESCR: HCPCS | Performed by: NURSE PRACTITIONER

## 2022-01-31 PROCEDURE — 87491 CHLMYD TRACH DNA AMP PROBE: CPT | Performed by: NURSE PRACTITIONER

## 2022-01-31 PROCEDURE — PNV: Performed by: NURSE PRACTITIONER

## 2022-02-01 LAB
C TRACH DNA SPEC QL NAA+PROBE: NEGATIVE
N GONORRHOEA DNA SPEC QL NAA+PROBE: NEGATIVE

## 2022-02-04 LAB
LAB AP GYN PRIMARY INTERPRETATION: NORMAL
LAB AP LMP: NORMAL
Lab: NORMAL

## 2022-02-09 ENCOUNTER — ROUTINE PRENATAL (OUTPATIENT)
Dept: OBGYN CLINIC | Facility: MEDICAL CENTER | Age: 22
End: 2022-02-09
Payer: COMMERCIAL

## 2022-02-09 ENCOUNTER — APPOINTMENT (OUTPATIENT)
Dept: LAB | Facility: MEDICAL CENTER | Age: 22
End: 2022-02-09
Payer: COMMERCIAL

## 2022-02-09 VITALS — WEIGHT: 160 LBS | SYSTOLIC BLOOD PRESSURE: 110 MMHG | BODY MASS INDEX: 25.44 KG/M2 | DIASTOLIC BLOOD PRESSURE: 60 MMHG

## 2022-02-09 DIAGNOSIS — R50.9 CHRONIC FEVER: ICD-10-CM

## 2022-02-09 DIAGNOSIS — Z3A.17 17 WEEKS GESTATION OF PREGNANCY: ICD-10-CM

## 2022-02-09 DIAGNOSIS — U07.1 COVID-19 AFFECTING PREGNANCY IN FIRST TRIMESTER: ICD-10-CM

## 2022-02-09 DIAGNOSIS — Z3A.17 17 WEEKS GESTATION OF PREGNANCY: Primary | ICD-10-CM

## 2022-02-09 DIAGNOSIS — O98.511 COVID-19 AFFECTING PREGNANCY IN FIRST TRIMESTER: ICD-10-CM

## 2022-02-09 LAB
SL AMB  POCT GLUCOSE, UA: NEGATIVE
SL AMB LEUKOCYTE ESTERASE,UA: NEGATIVE
SL AMB POCT BILIRUBIN,UA: NEGATIVE
SL AMB POCT BLOOD,UA: NEGATIVE
SL AMB POCT CLARITY,UA: CLEAR
SL AMB POCT COLOR,UA: YELLOW
SL AMB POCT KETONES,UA: 5
SL AMB POCT NITRITE,UA: NEGATIVE
SL AMB POCT PH,UA: 5
SL AMB POCT SPECIFIC GRAVITY,UA: 1.01
SL AMB POCT URINE PROTEIN: 2.5
SL AMB POCT UROBILINOGEN: 0.2

## 2022-02-09 PROCEDURE — PNV: Performed by: STUDENT IN AN ORGANIZED HEALTH CARE EDUCATION/TRAINING PROGRAM

## 2022-02-09 PROCEDURE — 87086 URINE CULTURE/COLONY COUNT: CPT | Performed by: STUDENT IN AN ORGANIZED HEALTH CARE EDUCATION/TRAINING PROGRAM

## 2022-02-09 PROCEDURE — 82105 ALPHA-FETOPROTEIN SERUM: CPT

## 2022-02-09 PROCEDURE — 36415 COLL VENOUS BLD VENIPUNCTURE: CPT

## 2022-02-09 PROCEDURE — 81002 URINALYSIS NONAUTO W/O SCOPE: CPT | Performed by: STUDENT IN AN ORGANIZED HEALTH CARE EDUCATION/TRAINING PROGRAM

## 2022-02-09 NOTE — PROGRESS NOTES
Routine Prenatal Visit  [facility] [address] [place] [address]    Assessment/Plan:  Primus Fiordaliza is a 19y o  year old  at 17w2d who presents for discussion      1  17 weeks gestation of pregnancy  Assessment & Plan:  - Intended to get NIPT and US at 12 weeks but this was delayed due to [place]  She is enrolled in a genetic counseling class tomorrow  - She reports a family history of spina bifida  MSAFP was ordered today  - I messaged the   staff to see if an earlier anatomic US could be ordered given the delay in getting her NIPT, her family history of spina bifida, and her recurrent/chronic fevers of unknown etiology; appreciate MFM input    Orders:  -     Alpha fetoprotein, maternal; Future  -     Urine culture  -     POCT urine dip    2  Chronic fever  Assessment & Plan:  - We reviewed her history of reported chronic intermittent fevers up to 100 8 that come and go  They started when she was diagnosed with COVID last month but have continued  She is afebrile here  - We reviewed the differential diagnosis including urinary or other  source, pulmonary, GI, skin/soft tissue  No identifiable source  Normal UA today  - We entertained causes such as toxoplasmosis (no identifiable exposure) and/or listeriosis (no GI symptoms)  - No personal history of VTE, normal HR and O2 sat today so PE less likely  No dyspnea  - Her PCP has ordered a large panel of blood work for fever workup  - Appreciate MFM input  3  COVID-19 affecting pregnancy in first trimester        Subjective:     CC: Prenatal care    Jerri Trujillo is a 19 y o   female who presents for routine prenatal care at 17w2d  Pregnancy ROS: Denies leakage of fluid, pelvic pain, or vaginal bleeding  Reports normal fetal movement      The following portions of the patient's history were reviewed and updated as appropriate: allergies, current medications, past family history, past medical history, obstetric history, gynecologic history, past social history, past surgical history and problem list       Objective:  /60   Wt 72 6 kg (160 lb)   LMP 10/11/2021   BMI 25 44 kg/m²   Pregravid Weight/BMI: 67 6 kg (149 lb) (BMI 23 69)  Current Weight: 72 6 kg (160 lb)   Total Weight Gain: 4 99 kg (11 lb)   Pre-Ihsan Vitals      Most Recent Value   Prenatal Assessment    Fetal Heart Rate 143   Prenatal Vitals    Blood Pressure 110/60   Weight - Scale 72 6 kg (160 lb)   Urine Albumin/Glucose    Dilation/Effacement/Station    Vaginal Drainage    Edema    LLE Edema None   RLE Edema None           General: Well appearing, no distress  Respiratory: Unlabored breathing  Cardiovascular: Regular rate  Abdomen: Soft, gravid, nontender  Fundal Height: Appropriate for gestational age  Extremities: Warm and well perfused  Non tender      Gris Reyes MD  [address] [place] [address]  2022 5:09 PM

## 2022-02-09 NOTE — ASSESSMENT & PLAN NOTE
- We reviewed her history of reported chronic intermittent fevers up to 100 8 that come and go  They started when she was diagnosed with COVID last month but have continued  She is afebrile here  - We reviewed the differential diagnosis including urinary or other  source, pulmonary, GI, skin/soft tissue  No identifiable source  Normal UA today  - We entertained causes such as toxoplasmosis (no identifiable exposure) and/or listeriosis (no GI symptoms)  - No personal history of VTE, normal HR and O2 sat today so PE less likely  No dyspnea  - Her PCP has ordered a large panel of blood work for fever workup  - Appreciate MFM input

## 2022-02-09 NOTE — ASSESSMENT & PLAN NOTE
- Intended to get NIPT and US at 12 weeks but this was delayed due to [place]  She is enrolled in a genetic counseling class tomorrow  - She reports a family history of spina bifida  MSAFP was ordered today    - I messaged the   staff to see if an earlier anatomic US could be ordered given the delay in getting her NIPT, her family history of spina bifida, and her recurrent/chronic fevers of unknown etiology; appreciate MFM input

## 2022-02-10 ENCOUNTER — TELEPHONE (OUTPATIENT)
Dept: PERINATAL CARE | Facility: CLINIC | Age: 22
End: 2022-02-10

## 2022-02-10 ENCOUNTER — TELEMEDICINE (OUTPATIENT)
Dept: PERINATAL CARE | Facility: CLINIC | Age: 22
End: 2022-02-10

## 2022-02-10 DIAGNOSIS — Z36.0 ENCOUNTER FOR ANTENATAL SCREENING FOR CHROMOSOMAL ANOMALIES: Primary | ICD-10-CM

## 2022-02-10 DIAGNOSIS — Z31.5 ENCOUNTER FOR PROCREATIVE GENETIC COUNSELING: ICD-10-CM

## 2022-02-10 LAB — BACTERIA UR CULT: NORMAL

## 2022-02-10 PROCEDURE — NC001 PR NO CHARGE

## 2022-02-10 NOTE — PROGRESS NOTES
Patient attended the Genetic Screening Education Session via [employer]  The group reviewed basic chromosome information and the increasing risk for aneuploidy based on patient age  Copy Number variants (microdeletions/duplications) were also reviewed with a general population risk of 0 4% in any pregnancy  The group discussed the options for prenatal screening and diagnosis for chromosomal abnormalities  The benefits and limitations of fetal anatomy ultrasound at 20 weeks gestation were reviewed  Quad screening consists of second trimester biochemical analysis  Results provide a numerical risk for Down syndrome, Trisomy 18, and open neural tube defects  Group attendees were instructed to contact their OB office if they desired Quad screen  Cell-free fetal DNA (NIPT) screening analyzes placental DNA in maternal serum and can assess the risk for Down syndrome, trisomy 25, trisomy 15, and sex chromosome anomalies  Results report as screen negative or screen positive, and fetal sex information is provided  The possibility of no-result and increased risk result was addressed  Maternal serum AFP screening is recommended at 16-18 weeks to screen for open neural tube defects  The benefits and limitations of these screens, including detection rates and false positive rates, were reviewed  Prenatal diagnostic testing is available via amniocentesis  The timing, risks (including their associated risks of miscarriage) and benefits were reviewed  Lastly, we reviewed that all pregnancies have a 3-6% risk of birth defect, genetic condition, or intellectual disability regardless of age or personal/family history  There is no available testing to rule out all conditions  We reveiwed potential costs associated with cell-free fetal DNA (NIPT) screen and provided resources, including information to check out of pocket cost with their insurance [employer] labs    Group attendees were instructed to contact [facility] Genetic Counseling (phone number provided) if they were interested in pursuing NIPT

## 2022-02-10 NOTE — TELEPHONE ENCOUNTER
Called patient to schedule follow up appointment, per staff message:    Delicia Brooke MD  Logan Flood 39 is [id number] and was unable to get in for a 12-14 week scan because she had COVID but isn't scheduled for anatomy until 21 weeks  Bojorquez Bitters she be offered an 18 week early anatomy scan? Armida Duranw has a family history of spina bifida so I am sending her MSAFP from the office  Left voicemail request patient to call back to schedule at [phone]  There are currently 2 appts available 2/16/22, request pt call back ASAP to schedule

## 2022-02-11 ENCOUNTER — TELEPHONE (OUTPATIENT)
Dept: PERINATAL CARE | Facility: CLINIC | Age: 22
End: 2022-02-11

## 2022-02-11 LAB
2ND TRIMESTER 4 SCREEN SERPL-IMP: NORMAL
AFP ADJ MOM SERPL: 0.93
AFP INTERP AMN-IMP: NORMAL
AFP INTERP SERPL-IMP: NORMAL
AFP INTERP SERPL-IMP: NORMAL
AFP SERPL-MCNC: 35.7 NG/ML
AGE AT DELIVERY: 23.7 YR
GA METHOD: NORMAL
GA: 17.3 WEEKS
IDDM PATIENT QL: NO
MULTIPLE PREGNANCY: NO
NEURAL TUBE DEFECT RISK FETUS: NORMAL %

## 2022-02-11 NOTE — TELEPHONE ENCOUNTER
Patient left message on genetic counseling voicemail stated she was interested in NIPT  Called patient back and confirmed date of birth  Discussed that she has the [employer] thus we will use [employer] [id number] and she will need to go to a [facility]'s lab to have drawn  Patient opted to  a kit at her ultrasound at our [address] [place], [place] [address] location on 2/16/22  Reviewed that the [id number] can screen for sex chromosome abnormalities and the fetal sex which the patient elected to learn  Discussed that the results take about 7-10 days and will be available in her MyChart to review  Patient expressed verbal understanding and had no questions

## 2022-02-12 ENCOUNTER — APPOINTMENT (OUTPATIENT)
Dept: LAB | Facility: MEDICAL CENTER | Age: 22
End: 2022-02-12
Payer: COMMERCIAL

## 2022-02-12 DIAGNOSIS — R50.9 FEVER, UNSPECIFIED FEVER CAUSE: ICD-10-CM

## 2022-02-12 LAB
ALBUMIN SERPL BCP-MCNC: 3.5 G/DL (ref 3.5–5)
ALP SERPL-CCNC: 46 U/L (ref 46–116)
ALT SERPL W P-5'-P-CCNC: 16 U/L (ref 12–78)
ANION GAP SERPL CALCULATED.3IONS-SCNC: 7 MMOL/L (ref 4–13)
AST SERPL W P-5'-P-CCNC: 11 U/L (ref 5–45)
BASOPHILS # BLD AUTO: 0.03 THOUSANDS/ΜL (ref 0–0.1)
BASOPHILS NFR BLD AUTO: 0 % (ref 0–1)
BILIRUB SERPL-MCNC: 0.29 MG/DL (ref 0.2–1)
BUN SERPL-MCNC: 6 MG/DL (ref 5–25)
CALCIUM SERPL-MCNC: 9.1 MG/DL (ref 8.3–10.1)
CHLORIDE SERPL-SCNC: 109 MMOL/L (ref 100–108)
CO2 SERPL-SCNC: 22 MMOL/L (ref 21–32)
CREAT SERPL-MCNC: 0.41 MG/DL (ref 0.6–1.3)
CRP SERPL QL: <3 MG/L
EOSINOPHIL # BLD AUTO: 0.02 THOUSAND/ΜL (ref 0–0.61)
EOSINOPHIL NFR BLD AUTO: 0 % (ref 0–6)
ERYTHROCYTE [DISTWIDTH] IN BLOOD BY AUTOMATED COUNT: 14.2 % (ref 11.6–15.1)
ERYTHROCYTE [SEDIMENTATION RATE] IN BLOOD: 21 MM/HOUR (ref 0–19)
GFR SERPL CREATININE-BSD FRML MDRD: 146 ML/MIN/1.73SQ M
GLUCOSE P FAST SERPL-MCNC: 70 MG/DL (ref 65–99)
HCT VFR BLD AUTO: 34.5 % (ref 34.8–46.1)
HGB BLD-MCNC: 11.6 G/DL (ref 11.5–15.4)
IMM GRANULOCYTES # BLD AUTO: 0.05 THOUSAND/UL (ref 0–0.2)
IMM GRANULOCYTES NFR BLD AUTO: 0 % (ref 0–2)
LYMPHOCYTES # BLD AUTO: 1.68 THOUSANDS/ΜL (ref 0.6–4.47)
LYMPHOCYTES NFR BLD AUTO: 15 % (ref 14–44)
MCH RBC QN AUTO: 31.1 PG (ref 26.8–34.3)
MCHC RBC AUTO-ENTMCNC: 33.6 G/DL (ref 31.4–37.4)
MCV RBC AUTO: 93 FL (ref 82–98)
MONOCYTES # BLD AUTO: 0.62 THOUSAND/ΜL (ref 0.17–1.22)
MONOCYTES NFR BLD AUTO: 6 % (ref 4–12)
NEUTROPHILS # BLD AUTO: 8.92 THOUSANDS/ΜL (ref 1.85–7.62)
NEUTS SEG NFR BLD AUTO: 79 % (ref 43–75)
NRBC BLD AUTO-RTO: 0 /100 WBCS
PLATELET # BLD AUTO: 278 THOUSANDS/UL (ref 149–390)
PMV BLD AUTO: 9.8 FL (ref 8.9–12.7)
POTASSIUM SERPL-SCNC: 3.6 MMOL/L (ref 3.5–5.3)
PROT SERPL-MCNC: 7.4 G/DL (ref 6.4–8.2)
RBC # BLD AUTO: 3.73 MILLION/UL (ref 3.81–5.12)
SODIUM SERPL-SCNC: 138 MMOL/L (ref 136–145)
TSH SERPL DL<=0.05 MIU/L-ACNC: 1.56 UIU/ML (ref 0.36–3.74)
WBC # BLD AUTO: 11.32 THOUSAND/UL (ref 4.31–10.16)

## 2022-02-12 PROCEDURE — 80053 COMPREHEN METABOLIC PANEL: CPT

## 2022-02-12 PROCEDURE — 85652 RBC SED RATE AUTOMATED: CPT

## 2022-02-12 PROCEDURE — 86706 HEP B SURFACE ANTIBODY: CPT

## 2022-02-12 PROCEDURE — 87389 HIV-1 AG W/HIV-1&-2 AB AG IA: CPT

## 2022-02-12 PROCEDURE — 86140 C-REACTIVE PROTEIN: CPT

## 2022-02-12 PROCEDURE — 85025 COMPLETE CBC W/AUTO DIFF WBC: CPT

## 2022-02-12 PROCEDURE — 86480 TB TEST CELL IMMUN MEASURE: CPT

## 2022-02-12 PROCEDURE — 87040 BLOOD CULTURE FOR BACTERIA: CPT

## 2022-02-12 PROCEDURE — 87086 URINE CULTURE/COLONY COUNT: CPT

## 2022-02-12 PROCEDURE — 84443 ASSAY THYROID STIM HORMONE: CPT

## 2022-02-12 PROCEDURE — 86708 HEPATITIS A ANTIBODY: CPT

## 2022-02-12 PROCEDURE — 80074 ACUTE HEPATITIS PANEL: CPT

## 2022-02-12 PROCEDURE — 36415 COLL VENOUS BLD VENIPUNCTURE: CPT

## 2022-02-12 PROCEDURE — 86704 HEP B CORE ANTIBODY TOTAL: CPT

## 2022-02-13 LAB
BACTERIA UR CULT: NORMAL
HAV AB SER QL IA: REACTIVE
HAV IGM SER QL: NORMAL
HBV CORE AB SER QL: NORMAL
HBV CORE IGM SER QL: NORMAL
HBV SURFACE AB SER-ACNC: 152.91 MIU/ML
HBV SURFACE AG SER QL: NORMAL
HCV AB SER QL: NORMAL

## 2022-02-14 ENCOUNTER — TELEPHONE (OUTPATIENT)
Dept: INTERNAL MEDICINE CLINIC | Facility: CLINIC | Age: 22
End: 2022-02-14

## 2022-02-14 LAB — HIV 1+2 AB+HIV1 P24 AG SERPL QL IA: NORMAL

## 2022-02-14 NOTE — TELEPHONE ENCOUNTER
Patient calling and stated, " I would like Dr Araceli Almendarez to call me about my abnormal lab results I see on [facility] "    # is [phone]

## 2022-02-16 ENCOUNTER — APPOINTMENT (OUTPATIENT)
Dept: LAB | Facility: CLINIC | Age: 22
End: 2022-02-16
Payer: COMMERCIAL

## 2022-02-16 ENCOUNTER — OFFICE VISIT (OUTPATIENT)
Dept: PERINATAL CARE | Facility: OTHER | Age: 22
End: 2022-02-16
Payer: COMMERCIAL

## 2022-02-16 VITALS
SYSTOLIC BLOOD PRESSURE: 117 MMHG | WEIGHT: 160.8 LBS | HEART RATE: 83 BPM | BODY MASS INDEX: 25.84 KG/M2 | HEIGHT: 66 IN | DIASTOLIC BLOOD PRESSURE: 54 MMHG

## 2022-02-16 DIAGNOSIS — O98.511 COVID-19 AFFECTING PREGNANCY IN FIRST TRIMESTER: ICD-10-CM

## 2022-02-16 DIAGNOSIS — Z33.1 PREGNANT STATE, INCIDENTAL: ICD-10-CM

## 2022-02-16 DIAGNOSIS — Z36.3 ENCOUNTER FOR ANTENATAL SCREENING FOR MALFORMATION USING ULTRASOUND: Primary | ICD-10-CM

## 2022-02-16 DIAGNOSIS — Z3A.18 18 WEEKS GESTATION OF PREGNANCY: ICD-10-CM

## 2022-02-16 DIAGNOSIS — Z36.86 ENCOUNTER FOR ANTENATAL SCREENING FOR CERVICAL LENGTH: ICD-10-CM

## 2022-02-16 DIAGNOSIS — Z3A.14 14 WEEKS GESTATION OF PREGNANCY: ICD-10-CM

## 2022-02-16 DIAGNOSIS — U07.1 COVID-19 AFFECTING PREGNANCY IN FIRST TRIMESTER: ICD-10-CM

## 2022-02-16 DIAGNOSIS — Z36.9 UNSPECIFIED ANTENATAL SCREENING: ICD-10-CM

## 2022-02-16 LAB
GAMMA INTERFERON BACKGROUND BLD IA-ACNC: 0.03 IU/ML
M TB IFN-G BLD-IMP: NEGATIVE
M TB IFN-G CD4+ BCKGRND COR BLD-ACNC: 0 IU/ML
M TB IFN-G CD4+ BCKGRND COR BLD-ACNC: 0 IU/ML
MISCELLANEOUS LAB TEST RESULT: NORMAL
MITOGEN IGNF BCKGRD COR BLD-ACNC: >10 IU/ML

## 2022-02-16 PROCEDURE — 76805 OB US >/= 14 WKS SNGL FETUS: CPT | Performed by: OBSTETRICS & GYNECOLOGY

## 2022-02-16 PROCEDURE — 99241 PR OFFICE CONSULTATION NEW/ESTAB PATIENT 15 MIN: CPT | Performed by: OBSTETRICS & GYNECOLOGY

## 2022-02-16 PROCEDURE — 36415 COLL VENOUS BLD VENIPUNCTURE: CPT

## 2022-02-16 PROCEDURE — 76817 TRANSVAGINAL US OBSTETRIC: CPT | Performed by: OBSTETRICS & GYNECOLOGY

## 2022-02-16 NOTE — LETTER
February 16, 2022     Peter Marco, [address] [facility] [address]    Patient: Claudia Wheat   YOB: 2000   Date of Visit: 2/16/2022       Dear Dr Corral Bring: Thank you for referring Marina Chao to me for evaluation  Below are my notes for this consultation  If you have questions, please do not hesitate to call me  I look forward to following your patient along with you  Sincerely,        Lizette Hansen MD        CC: No Recipients  Lizette Hansen MD  2/16/2022  5:22 PM  Sign when Signing Visit  CONSULT NOTE    Peter [place]water, [address] [facility],  [address]     Thank you for referring your Claudia Wheat for a Maternal-Fetal Medicine Consultation:  Below is my consultation  Thank you very much for requesting a consultation on this very nice patient for the indication of a fetal anatomic evaluation  This is the patient's 4th pregnancy  She has a history of 2 early first-trimester miscarriages and 1 full-term vaginal delivery without complications in 6574  Her son weighed 9 lb  She has a history of bipolar depression for which she is could not currently utilizing medications  She previously utilize Seroquel and Vrylar in the past   She received 3 doses of COVID-19 vaccination but also contracted COVID-19  She indicates after ehllen COVID-19, she has had low-grade fevers over the past 6 weeks  She indicates her fever is generally very low-grade and no more than 100 8°  She indicates that her fevers are primarily in the evening after a stressful day at work  She has lost family members over the past month and has had a significant stressful situation  She indicates she notes an increase in fever and temperature when she is dehydrated and becomes dizzy  The patient's history including substance use history and family medical history are otherwise unremarkable  A review of systems is otherwise negative      On exam today page appears well, in no acute distress, and denies any complaints  Covid-19 screening is negative for fever, shortness of breath and high risk travel  The patient had Mr  Sequential screening ultrasound an opportunity for noninvasive prenatal testing secondary to COVID in the 1st trimester  She had formal genetic counseling and at the conclusion of that discussion, she elected noninvasive prenatal testing  She was given a requisition to complete noninvasive prenatal testing through [facility]  The fetal anatomic survey is complete  There is no sonographic evidence of fetal abnormalities at this time  Good fetal movement and tone are seen  The amniotic fluid volume appears normal    A transvaginal ultrasound was performed to assess the cervix, which was not seen well transabdominally  The cervical length was 4 63 centimeters, which is normal for the current gestational age  There was no significant funneling or dynamic changes appreciated  The patient was informed of today's findings and all of her questions were answered  The limitations of ultrasound were reviewed  I reviewed the patient's symptoms of low-grade temperatures  Given that this appears to occur mostly in the evening after stress, it is unlikely to be secondary to an infectious process  We discussed the importance of maintaining hydration and I encouraged the patient to always have water and Gatorade near her  I encourage increased electrolytes and salts given that patient has a history of and describes orthostasis  We discussed follow-up in detail, and I recommend a follow-up growth evaluation in the 3rd trimester  Please note, in addition to the time spent discussing the results of the ultrasound, I spent approximately 15 minutes of face-to-face time with the patient, greater than 50% of which was spent in counseling and the coordination of care for this patient      Thank you very much for allowing us to participate in the care of this very nice patient  Should you have any questions, please do not hesitate to contact our office  Fransisco Perrin MD  Attending Physician, Lana

## 2022-02-16 NOTE — PROGRESS NOTES
Ultrasound Probe Disinfection    A transvaginal ultrasound was performed  Prior to use, disinfection was performed with High Level Disinfection Process (Trophon)  Probe serial number U2: [id number] was used        Nargis Talavera  02/16/22  9:26 AM

## 2022-02-16 NOTE — PROGRESS NOTES
CONSULT NOTE    Jhon Ragsdale, [address] [facility]  [place],  [address] [facility]     Thank you for referring your Manolo Clark for a Maternal-Fetal Medicine Consultation:  Below is my consultation  Thank you very much for requesting a consultation on this very nice patient for the indication of a fetal anatomic evaluation  This is the patient's 4th pregnancy  She has a history of 2 early first-trimester miscarriages and 1 full-term vaginal delivery without complications in 9137  Her son weighed 9 lb  She has a history of bipolar depression for which she is could not currently utilizing medications  She previously utilize Seroquel and Vrylar in the past   She received 3 doses of COVID-19 vaccination but also contracted COVID-19  She indicates after hellen COVID-19, she has had low-grade fevers over the past 6 weeks  She indicates her fever is generally very low-grade and no more than 100 8°  She indicates that her fevers are primarily in the evening after a stressful day at work  She has lost family members over the past month and has had a significant stressful situation  She indicates she notes an increase in fever and temperature when she is dehydrated and becomes dizzy  The patient's history including substance use history and family medical history are otherwise unremarkable  A review of systems is otherwise negative  On exam today page appears well, in no acute distress, and denies any complaints  Covid-19 screening is negative for fever, shortness of breath and high risk travel  The patient had Mr  Sequential screening ultrasound an opportunity for noninvasive prenatal testing secondary to COVID in the 1st trimester  She had formal genetic counseling and at the conclusion of that discussion, she elected noninvasive prenatal testing  She was given a requisition to complete noninvasive prenatal testing through [facility]  The fetal anatomic survey is complete    There is no sonographic evidence of fetal abnormalities at this time  Good fetal movement and tone are seen  The amniotic fluid volume appears normal    A transvaginal ultrasound was performed to assess the cervix, which was not seen well transabdominally  The cervical length was 4 63 centimeters, which is normal for the current gestational age  There was no significant funneling or dynamic changes appreciated  The patient was informed of today's findings and all of her questions were answered  The limitations of ultrasound were reviewed  I reviewed the patient's symptoms of low-grade temperatures  Given that this appears to occur mostly in the evening after stress, it is unlikely to be secondary to an infectious process  We discussed the importance of maintaining hydration and I encouraged the patient to always have water and Gatorade near her  I encourage increased electrolytes and salts given that patient has a history of and describes orthostasis  We discussed follow-up in detail, and I recommend a follow-up growth evaluation in the 3rd trimester  Please note, in addition to the time spent discussing the results of the ultrasound, I spent approximately 15 minutes of face-to-face time with the patient, greater than 50% of which was spent in counseling and the coordination of care for this patient  Thank you very much for allowing us to participate in the care of this very nice patient  Should you have any questions, please do not hesitate to contact our office  Fransisco Low MD  Attending Physician, Lana

## 2022-02-16 NOTE — PROGRESS NOTES
[employer] employee (or [employer] employee dependent) chose [id number] screen  Patient  provided with Joleen Alf brochure, test kit, [employer] and test requisition form  Patient  instructed [id number] lab must be drawn at an outpatient [facility] laboratory  Patient informed blood specimen is sent via [address] [place] [address] and screen processed/completed by in- network lab- [facility]  Patient instructed to check with insurance provider before having lab drawn to check her OOP copay/deductible, she must meet lab deductible  If patient receives bill for more than $250 00 advised to notify MFM office  Patient verbalized understanding of all instructions and has office # to call back any questions

## 2022-02-17 LAB
BACTERIA BLD CULT: NORMAL
BACTERIA BLD CULT: NORMAL

## 2022-02-27 PROBLEM — Z3A.20 20 WEEKS GESTATION OF PREGNANCY: Status: ACTIVE | Noted: 2022-01-31

## 2022-02-28 ENCOUNTER — ROUTINE PRENATAL (OUTPATIENT)
Dept: OBGYN CLINIC | Facility: MEDICAL CENTER | Age: 22
End: 2022-02-28

## 2022-02-28 VITALS — WEIGHT: 163 LBS | DIASTOLIC BLOOD PRESSURE: 68 MMHG | SYSTOLIC BLOOD PRESSURE: 118 MMHG | BODY MASS INDEX: 26.31 KG/M2

## 2022-02-28 DIAGNOSIS — Z3A.20 20 WEEKS GESTATION OF PREGNANCY: Primary | ICD-10-CM

## 2022-02-28 DIAGNOSIS — O98.511 COVID-19 AFFECTING PREGNANCY IN FIRST TRIMESTER: ICD-10-CM

## 2022-02-28 DIAGNOSIS — U07.1 COVID-19 AFFECTING PREGNANCY IN FIRST TRIMESTER: ICD-10-CM

## 2022-02-28 PROCEDURE — PNV: Performed by: OBSTETRICS & GYNECOLOGY

## 2022-02-28 NOTE — PROGRESS NOTES
Routine Prenatal Visit  [facility] [address] [place] [address]    Assessment/Plan:  Clifford Melo is a 19y o  year old  at 23w7d who presents for routine prenatal visit  1  20 weeks gestation of pregnancy  Assessment & Plan:  Had level 2 reviewed low placenta - next scan       2  COVID-19 affecting pregnancy in first trimester  Assessment & Plan:  Growth in 3rd trimester           Subjective:     CC: Prenatal care    Claudia Wheat is a 19 y o   female who presents for routine prenatal care at 19w6d  Pregnancy ROS: no leakage of fluid, pelvic pain, or vaginal bleeding  Not yet  fetal movement  The following portions of the patient's history were reviewed and updated as appropriate: allergies, current medications, past family history, past medical history, obstetric history, gynecologic history, past social history, past surgical history and problem list       Objective:  /68   Wt 73 9 kg (163 lb)   LMP 10/11/2021   BMI 26 31 kg/m²   Pregravid Weight/BMI: 67 6 kg (149 lb) (BMI 24 06)  Current Weight: 73 9 kg (163 lb)   Total Weight Gain: 6 35 kg (14 lb)   Pre-Ihsan Vitals      Most Recent Value   Prenatal Assessment    Movement Present   Prenatal Vitals    Blood Pressure 118/68   Weight - Scale 73 9 kg (163 lb)   Urine Albumin/Glucose    Dilation/Effacement/Station    Vaginal Drainage    Edema    LLE Edema None   RLE Edema None           General: Well appearing, no distress  Respiratory: Unlabored breathing  Cardiovascular: Regular rate  Abdomen: Soft, gravid, nontender  Fundal Height: Appropriate for gestational age  Extremities: Warm and well perfused  Non tender

## 2022-03-29 ENCOUNTER — ROUTINE PRENATAL (OUTPATIENT)
Dept: OBGYN CLINIC | Facility: MEDICAL CENTER | Age: 22
End: 2022-03-29

## 2022-03-29 VITALS — BODY MASS INDEX: 28.08 KG/M2 | WEIGHT: 174 LBS | DIASTOLIC BLOOD PRESSURE: 70 MMHG | SYSTOLIC BLOOD PRESSURE: 110 MMHG

## 2022-03-29 DIAGNOSIS — O98.511 COVID-19 AFFECTING PREGNANCY IN FIRST TRIMESTER: ICD-10-CM

## 2022-03-29 DIAGNOSIS — Z3A.24 24 WEEKS GESTATION OF PREGNANCY: Primary | ICD-10-CM

## 2022-03-29 DIAGNOSIS — U07.1 COVID-19 AFFECTING PREGNANCY IN FIRST TRIMESTER: ICD-10-CM

## 2022-03-29 PROCEDURE — PNV: Performed by: STUDENT IN AN ORGANIZED HEALTH CARE EDUCATION/TRAINING PROGRAM

## 2022-03-29 RX ORDER — BREAST PUMP
EACH MISCELLANEOUS ONCE
Qty: 1 EACH | Refills: 0 | Status: SHIPPED | OUTPATIENT
Start: 2022-03-29 | End: 2022-03-29

## 2022-03-29 NOTE — ASSESSMENT & PLAN NOTE
- Reviewed last US  - Glucose next visit  - Total weight gain 24 lbs, discussed 150 min of moderate intensity exercise is protective against excessive weight gain

## 2022-03-29 NOTE — PROGRESS NOTES
Routine Prenatal Visit  [facility] [address] [place] [address]    Assessment/Plan:  Willard Dietz is a 19y o  year old  at 25w3d who presents for routine prenatal visit  1  24 weeks gestation of pregnancy  Assessment & Plan:  - Reviewed last US  - Glucose next visit  - Total weight gain 24 lbs, discussed 150 min of moderate intensity exercise is protective against excessive weight gain      2  COVID-19 affecting pregnancy in first trimester        Subjective:     CC: Prenatal care    Maya Ortega is a 19 y o   female who presents for routine prenatal care at 24w1d  Pregnancy ROS: Denies leakage of fluid, pelvic pain, or vaginal bleeding  Reports fetal movement  The following portions of the patient's history were reviewed and updated as appropriate: allergies, current medications, past family history, past medical history, obstetric history, gynecologic history, past social history, past surgical history and problem list       Objective:  /70   Wt 78 9 kg (174 lb)   LMP 10/11/2021   BMI 28 08 kg/m²   Pregravid Weight/BMI: 67 6 kg (149 lb) (BMI 24 06)  Current Weight: 78 9 kg (174 lb)   Total Weight Gain: 11 3 kg (25 lb)   Pre-Ihsan Vitals      Most Recent Value   Prenatal Assessment    Fetal Heart Rate 155   Movement Present   Prenatal Vitals    Blood Pressure 110/70   Weight - Scale 78 9 kg (174 lb)   Urine Albumin/Glucose    Dilation/Effacement/Station    Vaginal Drainage    Edema    LLE Edema None   RLE Edema None           General: Well appearing, no distress  Respiratory: Unlabored breathing  Cardiovascular: Regular rate  Abdomen: Soft, gravid, nontender  Fundal Height: Appropriate for gestational age  Extremities: Warm and well perfused  Non tender      Doretha Dorsey MD  [address] [place] [address]  3/29/2022 5:53 PM

## 2022-04-26 ENCOUNTER — ROUTINE PRENATAL (OUTPATIENT)
Dept: OBGYN CLINIC | Facility: MEDICAL CENTER | Age: 22
End: 2022-04-26
Payer: COMMERCIAL

## 2022-04-26 VITALS — DIASTOLIC BLOOD PRESSURE: 60 MMHG | BODY MASS INDEX: 28.73 KG/M2 | SYSTOLIC BLOOD PRESSURE: 100 MMHG | WEIGHT: 178 LBS

## 2022-04-26 DIAGNOSIS — Z34.93 THIRD TRIMESTER PREGNANCY: ICD-10-CM

## 2022-04-26 DIAGNOSIS — Z3A.28 28 WEEKS GESTATION OF PREGNANCY: Primary | ICD-10-CM

## 2022-04-26 DIAGNOSIS — Z23 NEED FOR TDAP VACCINATION: ICD-10-CM

## 2022-04-26 DIAGNOSIS — F31.9 BIPOLAR DEPRESSION (HCC): ICD-10-CM

## 2022-04-26 LAB
BASOPHILS # BLD AUTO: 0.05 THOUSANDS/ΜL (ref 0–0.1)
BASOPHILS NFR BLD AUTO: 0 % (ref 0–1)
EOSINOPHIL # BLD AUTO: 0.13 THOUSAND/ΜL (ref 0–0.61)
EOSINOPHIL NFR BLD AUTO: 1 % (ref 0–6)
ERYTHROCYTE [DISTWIDTH] IN BLOOD BY AUTOMATED COUNT: 13.4 % (ref 11.6–15.1)
FERRITIN SERPL-MCNC: 6 NG/ML (ref 8–388)
GLUCOSE 1H P 50 G GLC PO SERPL-MCNC: 92 MG/DL (ref 40–134)
HCT VFR BLD AUTO: 30.3 % (ref 34.8–46.1)
HGB BLD-MCNC: 10.2 G/DL (ref 11.5–15.4)
IMM GRANULOCYTES # BLD AUTO: 0.2 THOUSAND/UL (ref 0–0.2)
IMM GRANULOCYTES NFR BLD AUTO: 2 % (ref 0–2)
LYMPHOCYTES # BLD AUTO: 1.74 THOUSANDS/ΜL (ref 0.6–4.47)
LYMPHOCYTES NFR BLD AUTO: 14 % (ref 14–44)
MCH RBC QN AUTO: 31.2 PG (ref 26.8–34.3)
MCHC RBC AUTO-ENTMCNC: 33.7 G/DL (ref 31.4–37.4)
MCV RBC AUTO: 93 FL (ref 82–98)
MONOCYTES # BLD AUTO: 1.14 THOUSAND/ΜL (ref 0.17–1.22)
MONOCYTES NFR BLD AUTO: 9 % (ref 4–12)
NEUTROPHILS # BLD AUTO: 8.96 THOUSANDS/ΜL (ref 1.85–7.62)
NEUTS SEG NFR BLD AUTO: 74 % (ref 43–75)
NRBC BLD AUTO-RTO: 0 /100 WBCS
PLATELET # BLD AUTO: 260 THOUSANDS/UL (ref 149–390)
PMV BLD AUTO: 9.9 FL (ref 8.9–12.7)
RBC # BLD AUTO: 3.27 MILLION/UL (ref 3.81–5.12)
WBC # BLD AUTO: 12.22 THOUSAND/UL (ref 4.31–10.16)

## 2022-04-26 PROCEDURE — 36415 COLL VENOUS BLD VENIPUNCTURE: CPT | Performed by: OBSTETRICS & GYNECOLOGY

## 2022-04-26 PROCEDURE — PNV: Performed by: OBSTETRICS & GYNECOLOGY

## 2022-04-26 PROCEDURE — 82950 GLUCOSE TEST: CPT | Performed by: OBSTETRICS & GYNECOLOGY

## 2022-04-26 PROCEDURE — 90471 IMMUNIZATION ADMIN: CPT | Performed by: OBSTETRICS & GYNECOLOGY

## 2022-04-26 PROCEDURE — 82728 ASSAY OF FERRITIN: CPT | Performed by: OBSTETRICS & GYNECOLOGY

## 2022-04-26 PROCEDURE — 90715 TDAP VACCINE 7 YRS/> IM: CPT | Performed by: OBSTETRICS & GYNECOLOGY

## 2022-04-26 PROCEDURE — 85025 COMPLETE CBC W/AUTO DIFF WBC: CPT | Performed by: OBSTETRICS & GYNECOLOGY

## 2022-04-26 NOTE — PROGRESS NOTES
Routine Prenatal Visit  [facility] [address] [place] [address]    Assessment/Plan:  Lolis Vaughn is a 19y o  year old  at 28w1d who presents for routine prenatal visit  1  28 weeks gestation of pregnancy  Assessment & Plan:  - Birth plan given, peds list given, birth consent signed  - 28 wk labs drawn  - Tdap to be given, recommendations reviewed for family vaccination  - Rhogam not indicated    Last scan - reviewed next        Orders:  -     Glucose, 1H PG  -     CBC and differential  -     Tdap Vaccine greater than or equal to 6yo  -     Anemia Panel w/Reflex, OB    2  Third trimester pregnancy  -     Glucose, 1H PG  -     CBC and differential  -     Tdap Vaccine greater than or equal to 6yo  -     Anemia Panel w/Reflex, OB    3  Need for Tdap vaccination  -     Tdap Vaccine greater than or equal to 6yo    4  Bipolar depression ([facility] [address] )  Assessment & Plan:  Currently not on meds at this time   Reports today           Subjective:     CC: Prenatal care    Sherryle Imperial is a 19 y o   female who presents for routine prenatal care at 28w1d  Pregnancy ROS: no leakage of fluid, pelvic pain, or vaginal bleeding  Yes  fetal movement      The following portions of the patient's history were reviewed and updated as appropriate: allergies, current medications, past family history, past medical history, obstetric history, gynecologic history, past social history, past surgical history and problem list       Objective:  /60   Wt 80 7 kg (178 lb)   LMP 10/11/2021   BMI 28 73 kg/m²   Pregravid Weight/BMI: 67 6 kg (149 lb) (BMI 24 06)  Current Weight: 80 7 kg (178 lb)   Total Weight Gain: 13 2 kg (29 lb)   Pre-Ihsan Vitals      Most Recent Value   Prenatal Assessment    Fetal Heart Rate 156   Movement Present   Prenatal Vitals    Blood Pressure 100/60   Weight - Scale 80 7 kg (178 lb)   Urine Albumin/Glucose    Dilation/Effacement/Station    Vaginal Drainage    Edema LLE Edema None   RLE Edema None           General: Well appearing, no distress  Respiratory: Unlabored breathing  Cardiovascular: Regular rate  Abdomen: Soft, gravid, nontender  Fundal Height: Appropriate for gestational age  Extremities: Warm and well perfused  Non tender

## 2022-04-26 NOTE — ASSESSMENT & PLAN NOTE
- Birth plan given, peds list given, birth consent signed  - 28 wk labs drawn  - Tdap to be given, recommendations reviewed for family vaccination  - Rhogam not indicated    Last scan - reviewed next  5/25

## 2022-05-06 PROBLEM — Z3A.29 29 WEEKS GESTATION OF PREGNANCY: Status: ACTIVE | Noted: 2022-01-31

## 2022-05-06 PROBLEM — O99.343 DEPRESSION DURING PREGNANCY IN THIRD TRIMESTER: Status: ACTIVE | Noted: 2022-01-31

## 2022-05-06 PROBLEM — O99.013 ANEMIA DURING PREGNANCY IN THIRD TRIMESTER: Status: ACTIVE | Noted: 2022-05-06

## 2022-05-06 PROBLEM — O44.43 LOW LYING PLACENTA NOS OR WITHOUT HEMORRHAGE, THIRD TRIMESTER: Status: ACTIVE | Noted: 2022-05-06

## 2022-05-06 NOTE — PATIENT INSTRUCTIONS
Thank you for visiting [facility]  You may be invited to complete a survey about you visit  Your responses will help us improve care we provide  A 10 means the care you received at your visit met your expectations  If you are unable to give a 10 please list reasons so we can work on improving your patient experience  Kick Counts in Pregnancy   WHAT YOU NEED TO KNOW:   Kick counts measure how much your baby is moving in your womb  A kick from your baby can be felt as a twist, turn, swish, roll, or jab  It is common to feel your baby kicking at 26 to 28 weeks of pregnancy  You may feel your baby kick as early as 20 weeks of pregnancy  You may want to start counting at 28 weeks  DISCHARGE INSTRUCTIONS:   Contact your doctor immediately if:   · You feel a change in the number of kicks or movements of your baby  · You feel fewer than 10 kicks within 2 hours  · You have questions or concerns about your baby's movements  Why measure kick counts:  Your baby's movement may provide information about your baby's health  He or she may move less, or not at all, if there are problems  Your baby may move less if he or she is not getting enough oxygen or nutrition from the placenta  Do not smoke while you are pregnant  Smoking decreases the amount of oxygen that gets to your baby  Talk to your healthcare provider if you need help to quit smoking  Tell your healthcare provider as soon as you feel a change in your baby's movements  When to measure kick counts:   · Measure kick counts at the same time every day  · Measure kick counts when your baby is awake and most active  Your baby may be most active in the evening  How to measure kick counts:  Check that your baby is awake before you measure kick counts  You can wake up your baby by lightly pushing on your belly, walking, or drinking something cold  Your healthcare provider may tell you different ways to measure kick counts   You may be told to do the following:  · Use a chart or clock to keep track of the time you start and finish counting  · Sit in a chair or lie on your left side  · Place your hands on the largest part of your belly  · Count until you reach 10 kicks  Write down how much time it takes to count 10 kicks  · It may take 30 minutes to 2 hours to count 10 kicks  It should not take more than 2 hours to count 10 kicks  Follow up with your doctor as directed:  Write down your questions so you remember to ask them during your visits  © Copyright [employer]  Information is for End User's use only and may not be sold, redistributed or otherwise used for commercial purposes  All illustrations and images included in CareNotes® are the copyrighted property of A D A M , Inc  or Adolfo Vidal  The above information is an  only  It is not intended as medical advice for individual conditions or treatments  Talk to your doctor, nurse or pharmacist before following any medical regimen to see if it is safe and effective for you  COVID-19 and Pregnancy   AMBULATORY CARE:   What you need to know about coronavirus disease 2019 (COVID-19) and pregnancy:  Pregnancy increases your risk for severe COVID-19 illness  COVID-19 can also lead to  delivery of your baby  Most babies who become infected with the new virus do not develop serious effects, but some do  It is important for you and your baby to stay safe during pregnancy and delivery  Signs and symptoms of COVID-19 in newborns: The following signs and symptoms may be from COVID-19, but they are also common in newborns  Your 's healthcare provider may recommend testing to confirm or rule out COVID-19  Your  may need a second test if the first is negative    · Fever    · Not moving arms or legs much, or being too sleepy to feed    · A runny nose or cough    · Fast breathing, or trouble breathing    · Vomiting, diarrhea, or not feeding well    Call your local emergency number (911 in the [address]) if:   · You have trouble breathing or shortness of breath at rest     · You have chest pain or pressure that lasts longer than 5 minutes  · You become confused or hard to wake  · Your lips or face are blue  Seek care immediately if:   · You have a fever of 104°F (40°C) or higher  Call your doctor if:   · You have symptoms of COVID-19  · You have questions or concerns about your condition or care  How the 2019 coronavirus spreads: The virus spreads quickly and easily  The virus can be passed starting 2 to 3 days before symptoms begin or before a positive test if symptoms never begin  · Droplets are the main way all coronaviruses spread  The virus travels in droplets that form when a person talks, sings, coughs, or sneezes  The droplets can also float in the air for minutes or hours  Infection happens when you breathe in the droplets or get them in your eyes or nose  Close personal contact with an infected person increases your risk for infection  This means being within 6 feet (2 meters) of the person for at least 15 minutes over 24 hours  · Person-to-person contact can spread the virus  For example, a person with the virus on his or her hands can spread it by shaking hands with someone  · The virus can stay on objects and surfaces for up to 3 days  You may become infected by touching the object or surface and then touching your eyes or mouth  Protect yourself and your baby while you are pregnant: If you have COVID-19 during your pregnancy, healthcare providers will monitor you and your baby closely  Work with your healthcare provider or obstetrician  If you do not have either, experts recommend you contact a local [facility] health center or health department  The following measures can help keep you and your baby safe  Continue even after you are vaccinated against COVID-19   These are still the best ways to prevent infection:  · Wash your hands throughout the day  Use soap and water  Rub your soapy hands together, lacing your fingers  Wash the front and back of each hand, and in between your fingers  Use the fingers of one hand to scrub under the fingernails of the other hand  Wash for at least 20 seconds  Rinse with warm, running water for several seconds  Dry your hands with a clean towel or paper towel  Use hand  that contains alcohol if soap and water are not available  · Protect yourself from sneezes and coughs  Turn your face away and cover your mouth and nose if you are around someone who is sneezing or coughing  This helps protect you from the person's droplets  Cover your mouth and nose with a tissue when you need to sneeze or cough  Use the bend of your arm if you do not have a tissue  Throw the tissue away  Then wash your hands or use hand   · Try not to touch your face  If you get the virus on your hands, you can transfer it to your eyes, nose, or mouth and become infected  You can also transfer it to objects, surfaces, or people  · Follow worldwide, national, and local social distancing guidelines  Keep at least 6 feet (2 meters) between you and others  · Wear a face covering (mask) when needed  Use a disposable non-medical mask, or make a cloth covering with at least 2 layers  You can also create layers by putting a cloth covering over a disposable non-medical mask  Cover your mouth and your nose  Continue social distancing and washing your hands often  Do not put a face shield or covering on your   These increase the risk for sudden infant death syndrome (SIDS)  · Clean and disinfect high-touch surfaces and objects often  Use disinfecting wipes, or make a solution of 4 teaspoons of bleach in 1 quart (4 cups) of water  · Stay home if you are sick or think you may have COVID-19    It is important to stay home if you are waiting for a testing appointment or for test results  · Avoid or limit close physical contact with anyone who does not live in your home  Do not shake hands with, hug, or kiss a person as a greeting  If you must use public transportation (such as a bus or subway), try to sit or stand away from others  Wear your face covering  · Avoid in-person gatherings and crowds  Attend virtually if possible  What you can do to have a healthy pregnancy:   · Keep all prenatal and  appointments  You may be able to have certain prenatal appointments without having to go into the provider's office  Some providers offer phone, video, or other types of appointments  You may also be able to get prescriptions for a few months at a time  This will help lower the number of trips you need to make to the pharmacy for refills  If you do need to go into your provider's office, take precautions  Put a face covering on before you go into the office  Do not stand or sit within 6 feet (2 meters) of anyone in the waiting room, if possible  Do not stand or sit near anyone who is not wearing a face covering  · Get recommended vaccines  Your healthcare provider can tell you if you need vaccines not listed below, and when to get them  ? COVID-19 vaccines are given as a shot in 1 or 2 doses  Vaccination is recommended for everyone 5 years or older  One 2-dose vaccine is fully approved for those 12 or older  This vaccine also has an emergency use authorization (EUA) for children 11to 13years old  No vaccine is currently available for children younger than 5 years  A booster (additional) dose is given to help the immune system continue to protect against severe COVID-19  § A booster is recommended for all adults 18 or older  The booster can be a different brand of the COVID-19 vaccine than you originally received  The timing for the booster depends on the type of vaccine you received:    § 1-dose vaccine:   The booster is given at least 2 months after you received the vaccine  § 2-dose vaccine: The booster is given at least 6 months after the second dose   § A booster can be given to adolescents 12to 16years old  Only 1 COVID-19 vaccine has an EUA for adolescent boosters  The booster is given at least 6 months after the second dose of the original vaccine series  · Get the influenza (flu) vaccine  Try to get the vaccine as soon as recommended each year, usually starting in September or October  · Get the Tdap vaccine  The Tdap vaccine protects you from tetanus, diphtheria, and pertussis  If possible, get the vaccine during weeks 27 to 36 of your pregnancy  You should get a dose of Tdap with each pregnancy  Take prenatal vitamins as directed  Your prenatal vitamins should contain folic acid  You need about 600 micrograms (mcg) of folic acid each day during pregnancy  Folic acid helps to form your baby's brain and spinal cord in early pregnancy  Eat a variety of healthy foods  Healthy foods are important, even if you take a prenatal vitamin  Healthy foods contain nutrients that help keep your immune system strong  Examples of healthy foods include vegetables, fruits, whole-grain breads and cereals, lean meats and poultry, fish, low-fat dairy products, and cooked beans  Do not have raw, undercooked, or unpasteurized food or drinks  Unpasteurized foods are foods that have not gone through the heating process (pasteurization) that destroys bacteria  Your healthcare provider or a dietitian can help you create healthy meal plans  Talk to your healthcare provider about exercise  Moderate exercise can help keep your immune system strong  Your healthcare provider can help you plan an exercise program that is safe for you during pregnancy  You may need to exercise at home if you cannot exercise outdoors, such as walking in a park  If you want to do pregnancy yoga or other group activities, be safe   Stay at least 6 feet (2 meters) away from others in the class, and the instructor  Wash your hands before you leave the building  Follow the facility's instructions for preventing infections  Try to lower your stress  You may be feeling more stressed than usual because of the COVID-19 outbreak  You may also feel stress from not being able to share your pregnancy with others  For example, you may not be able to have someone with you during prenatal visits or ultrasounds  Talk to your healthcare providers about ways to manage stress during this time  Pick 1 or 2 times a day to watch the news  Constant news watching about COVID-19 can increase your stress levels  Set a sleep schedule to go to bed and wake up at the same times each day  Do not smoke cigarettes, drink alcohol, or use drugs  Nicotine and other chemicals in cigarettes and cigars can harm your baby and your health  Alcohol can increase your risk for a miscarriage  Your baby may also be born too small or have other health problems  Certain drugs can be passed to your baby before he or she is born  Some can be passed through breast milk  It is best to quit cigarettes, alcohol, and drugs before you become pregnant and not start again after your baby is born  Ask your healthcare provider for information if you currently use any of these and need help to quit  Protect your  during delivery and while you are in the hospital:  It is not known for sure if an unborn baby can be infected with the virus that causes COVID-19  Some newborns have tested positive for the virus  The newborns may have been infected before, during, or after birth  The greatest risk is for a  to be in close contact with an infected person  Your baby may be tested for the virus soon after being born if you have COVID-23  He or she may be tested again before you leave the hospital  This depends on whether your baby has any signs or symptoms of COVID-19   You will be able to make choices for you and your baby during your hospital stay  Talk to healthcare providers about the following:  · Ask about temporary separation if you have COVID-19  Temporary separation means your  is moved to a different room from you  You will be able to make the decision if you want to do this  Separation will help lower your 's risk for being infected  You will still be able to give your  breast milk  You may need to pump the milk  Someone who does not have COVID-19 will then feed the pumped milk to your   You may instead choose to have your baby brought to you when you want to breastfeed  Wash your hands and the skin around your nipples before you hold your baby  Wear a face covering while you breastfeed  · Be careful if you have COVID-19 and do not choose temporary separation  Healthcare providers will keep your  at least 6 feet (2 meters) away from you as much as possible  Your  may be placed in an incubator  The incubator will help protect your  from infection  Always wash your hands and put on a face covering when you hold, touch, or have close contact with your   · Ask about visitors  The facility may not be allowing any visitors to newborns during this time  If you are allowed visitors, you may need to limit how many you can have at a time  Do not allow anyone who has known or suspected COVID-19 to visit  Even without signs or symptoms, the person can infect your  or others in the room  All visitors need to wash their hands and put on clean face coverings before entering your room  The face covering needs to stay on during the whole visit  Do not let anyone take the face covering down to make faces at your baby, talk, sneeze, or cough  Do not let anyone kiss you or your baby  Protect your  at home:   · You can choose to continue temporary separation if you have COVID-19    You can do this if an adult who does not have COVID-19 can care for your   Your healthcare provider can give you instructions on how to do this safely at home  Only have close contact with your  when needed  Remember to wash your hands and put on a clean face covering first  You may need to continue pumping your breast milk  A healthy adult can feed the pumped breast milk to your   You may instead choose to have your baby brought to you when you want to breastfeed  Take precautions to keep your baby safe  Wash your hands and the skin around your nipples before you hold your baby  You will also need to wear a face covering while you breastfeed  · Use face coverings safely  Everyone who has COVID-19 needs to wear a clean face covering while being within 6 feet (2 meters) of your   This includes other children in your home who are 2 years or older  Do not put a face covering or plastic face shield on your   Any covering increases your 's risk for sudden infant death syndrome (SIDS)  Do not use coverings on children younger than 2 years or on anyone who has breathing problems or cannot remove it  · Be careful about visitors  Continue precautions you used in the hospital  Do not allow anyone who has known or suspected COVID-19 to come over to see your   Have visitors put on clean face coverings before they enter your home  Have them wash their hands as soon as they come in  The face covering needs to stay on during the whole visit  · Keep all checkup appointments  You may be able to have some appointments by phone or video meeting  Other appointments will need to be in person, such as for vaccines  Vaccines are normally given to babies at certain ages  Until COVID-19 is under control, your 's provider will give you a vaccine schedule  It is important for your  to get all recommended vaccines         What you need to know about breastfeeding:  Breastfeeding for the first 6 months decreases your baby's risk for respiratory (lung) infections, allergies, asthma, and stomach problems  Breast milk also helps your baby develop a strong immune system  Breast milk is considered safe, even if you have COVID-19  Experts currently believe the virus that causes COVID-19 does not spread in breast milk  Do the following to help protect your baby:  · Wash your hands before every breastfeeding or pumping session  Even if you do not have COVID-19, you can transfer the virus from your hands to your baby or the pump  Use soap and water to wash your hands whenever possible  Use hand  that contains alcohol if soap and water are not available  · Clean and sanitize your breast pump after each use  Follow the 's directions for cleaning and sanitizing the pump  It is important not to use it until it is clean and sanitized  · If you have COVID-19:      ? Wear a face covering while you breastfeed or pump  This will help prevent you from passing the virus through droplets when you talk, cough, sneeze, or sing  The virus can stay on surfaces such as a breast pump for hours to days  ? Have someone who is not infected bottle feed your baby, if possible  Have the person wash his or her hands with soap and water before each feeding  The person can feed your  pumped breast milk or formula  Follow up with your doctor or obstetrician as directed:  Write down your questions so you remember to ask them during your visits  For more information:   · Centers for Disease Control and Prevention  [address] , [address]  Phone: 3- 257 - 086-2220  Web Address: [employer] com br    ©  [employer]  Information is for End User's use only and may not be sold, redistributed or otherwise used for commercial purposes  All illustrations and images included in CareNotes® are the copyrighted property of [employer]  or [facility] Caridad Salmeron   The above information is an  only   It is not intended as medical advice for individual conditions or treatments  Talk to your doctor, nurse or pharmacist before following any medical regimen to see if it is safe and effective for you  Pregnancy at [address]:   What changes are happening with my body? You may continue to have symptoms such as shortness of breath, heartburn, contractions, or swelling of your ankles and feet  You may be gaining about 1 pound a week now  How do I care for myself at this stage of my pregnancy? · Eat a variety of healthy foods  Healthy foods include fruits, vegetables, whole-grain breads, low-fat dairy foods, beans, lean meats, and fish  Drink liquids as directed  Ask how much liquid to drink each day and which liquids are best for you  Limit caffeine to less than 200 milligrams each day  Limit your intake of fish to 2 servings each week  Choose fish low in mercury such as canned light tuna, shrimp, salmon, cod, or tilapia  Do not  eat fish high in mercury such as swordfish, tilefish, licha mackerel, and shark  · Manage heartburn  by eating 4 or 5 small meals each day instead of large meals  Avoid spicy food  · Manage swelling  by lying down and putting your feet up  · Take prenatal vitamins as directed  Your need for certain vitamins and minerals, such as folic acid, increases during pregnancy  Prenatal vitamins provide some of the extra vitamins and minerals you need  Prenatal vitamins may also help to decrease the risk of certain birth defects  · Talk to your healthcare provider about exercise  Moderate exercise can help you stay fit  Your healthcare provider will help you plan an exercise program that is safe for you during pregnancy  · Do not smoke  Smoking increases your risk of a miscarriage and other health problems during your pregnancy  Smoking can cause your baby to be born too early or weigh less at birth   Ask your healthcare provider for information if you need help quitting  · Do not drink alcohol  Alcohol passes from your body to your baby through the placenta  It can affect your baby's brain development and cause fetal alcohol syndrome (FAS)  FAS is a group of conditions that causes mental, behavior, and growth problems  · Talk to your healthcare provider before you take any medicines  Many medicines may harm your baby if you take them when you are pregnant  Do not take any medicines, vitamins, herbs, or supplements without first talking to your healthcare provider  Never use illegal or street drugs (such as marijuana or cocaine) while you are pregnant  What are some safety tips during pregnancy? · Avoid hot tubs and saunas  Do not use a hot tub or sauna while you are pregnant, especially during your first trimester  Hot tubs and saunas may raise your baby's temperature and increase the risk of birth defects  · Avoid toxoplasmosis  This is an infection caused by eating raw meat or being around infected cat feces  It can cause birth defects, miscarriages, and other problems  Wash your hands after you touch raw meat  Make sure any meat is well-cooked before you eat it  Avoid raw eggs and unpasteurized milk  Use gloves or ask someone else to clean your cat's litter box while you are pregnant  What changes are happening with my baby? By 34 weeks, your baby may weigh more than 5 pounds  Your baby will be about 12 ½ inches long from the top of the head to the rump (baby's bottom)  Your baby is gaining about ½ pound a week  Your baby's eyes open and close now  Your baby's kicks and movements are more forceful at this time  What do I need to know about prenatal care? Your healthcare provider will check your blood pressure and weight  You may also need the following:  · A urine test  may also be done to check for sugar and protein  These can be signs of gestational diabetes or infection   Protein in your urine may also be a sign of preeclampsia  Preeclampsia is a condition that can develop during week 20 or later of your pregnancy  It causes high blood pressure, and it can cause problems with your kidneys and other organs  · A gestational diabetes screen  may be done  Your healthcare provider may order either a 1-step or 2-step oral glucose tolerance test (OGTT)  ? 1-step OGTT:  Your blood sugar level will be tested after you have not eaten for 8 hours (fasting)  You will then be given a glucose drink  Your level will be tested again 1 hour and 2 hours after you finish the drink  ? 2-step OGTT:  You do not have to fast for the first part of the test  You will have the glucose drink at any time of day  Your blood sugar level will be checked 1 hour later  If your blood sugar is higher than a certain level, another test will be ordered  You will fast and your blood sugar level will be tested  You will have the glucose drink  Your blood will be tested again 1 hour, 2 hours, and 3 hours after you finish the glucose drink  · A Tdap vaccine  may be recommended by your healthcare provider  · Fundal height  is a measurement of your uterus to check your baby's growth  This number is usually the same as the number of weeks that you have been pregnant  Your healthcare provider may also check your baby's position  · Your baby's heart rate  will be checked  When should I seek immediate care? · You develop a severe headache that does not go away  · You have new or increased vision changes, such as blurred or spotted vision  · You have new or increased swelling in your face or hands  · You have vaginal spotting or bleeding  · Your water broke or you feel warm water gushing or trickling from your vagina  When should I call my obstetrician? · You have more than 5 contractions in 1 hour  · You notice any changes in your baby's movements  · You have abdominal cramps, pressure, or tightening      · You have a change in vaginal discharge  · You have chills or a fever  · You have vaginal itching, burning, or pain  · You have yellow, green, white, or foul-smelling vaginal discharge  · You have pain or burning when you urinate, less urine than usual, or pink or bloody urine  · You have questions or concerns about your condition or care  CARE AGREEMENT:   You have the right to help plan your care  Learn about your health condition and how it may be treated  Discuss treatment options with your healthcare providers to decide what care you want to receive  You always have the right to refuse treatment  The above information is an  only  It is not intended as medical advice for individual conditions or treatments  Talk to your doctor, nurse or pharmacist before following any medical regimen to see if it is safe and effective for you  © Copyright National [employer] of [employer] (NIH) 2022 Information is for End User's use only and may not be sold, redistributed or otherwise used for commercial purposes  All illustrations and images included in CareNotes® are the copyrighted property of A D A M , Inc  or [address] [place] [address] Jaron   Pregnancy at 27 to 30 [address]:   What changes are happening in my body? You may notice new symptoms such as shortness of breath, heartburn, or swelling of your ankles and feet  You may also have trouble sleeping or contractions  How do I care for myself at this stage of my pregnancy? · Eat a variety of healthy foods  Healthy foods include fruits, vegetables, whole-grain breads, low-fat dairy foods, beans, lean meats, and fish  Drink liquids as directed  Ask how much liquid to drink each day and which liquids are best for you  Limit caffeine to less than 200 milligrams each day  Limit your intake of fish to 2 servings each week  Choose fish low in mercury such as canned light tuna, shrimp, salmon, cod, or tilapia   Do not  eat fish high in mercury such as swordfish, tilefish, licha mackerel, and shark  · Manage heartburn  by eating 4 or 5 small meals each day instead of large meals  Avoid spicy food  · Manage swelling  by lying down and putting your feet up  · Take prenatal vitamins as directed  Your need for certain vitamins and minerals, such as folic acid, increases during pregnancy  Prenatal vitamins provide some of the extra vitamins and minerals you need  Prenatal vitamins may also help to decrease the risk of certain birth defects  · Talk to your healthcare provider about exercise  Moderate exercise can help you stay fit  Your healthcare provider will help you plan an exercise program that is safe for you during pregnancy  · Do not smoke  Smoking increases your risk of a miscarriage and other health problems during your pregnancy  Smoking can cause your baby to be born too early or weigh less at birth  Ask your healthcare provider for information if you need help quitting  · Do not drink alcohol  Alcohol passes from your body to your baby through the placenta  It can affect your baby's brain development and cause fetal alcohol syndrome (FAS)  FAS is a group of conditions that causes mental, behavior, and growth problems  · Talk to your healthcare provider before you take any medicines  Many medicines may harm your baby if you take them when you are pregnant  Do not take any medicines, vitamins, herbs, or supplements without first talking to your healthcare provider  Never use illegal or street drugs (such as marijuana or cocaine) while you are pregnant  What are some safety tips during pregnancy? · Avoid hot tubs and saunas  Do not use a hot tub or sauna while you are pregnant, especially during your first trimester  Hot tubs and saunas may raise your baby's temperature and increase the risk of birth defects  · Avoid toxoplasmosis  This is an infection caused by eating raw meat or being around infected cat feces   It can cause birth defects, miscarriages, and other problems  Wash your hands after you touch raw meat  Make sure any meat is well-cooked before you eat it  Avoid raw eggs and unpasteurized milk  Use gloves or ask someone else to clean your cat's litter box while you are pregnant  What changes are happening with my baby? By 30 weeks, your baby may weigh more than 3 pounds  Your baby may be about 11 inches long from the top of the head to the rump (baby's bottom)  Your baby's eyes open and close now  Your baby's kicks and movements are more forceful at this time  What do I need to know about prenatal care? Your healthcare provider will check your blood pressure and weight  You may also need the following:  · Blood tests  may be done to check for anemia or blood type  · A urine test  may also be done to check for sugar and protein  These can be signs of gestational diabetes or infection  Protein in your urine may also be a sign of preeclampsia  Preeclampsia is a condition that can develop during week 20 or later of your pregnancy  It causes high blood pressure, and it can cause problems with your kidneys and other organs  · A Tdap vaccine and flu vaccine  may be recommended by your healthcare provider  · A gestational diabetes screen  may be done  Your healthcare provider may order either a 1-step or 2-step oral glucose tolerance test (OGTT)  ? 1-step OGTT:  Your blood sugar level will be tested after you have not eaten for 8 hours (fasting)  You will then be given a glucose drink  Your level will be tested again 1 hour and 2 hours after you finish the drink  ? 2-step OGTT:  You do not have to fast for the first part of the test  You will have the glucose drink at any time of day  Your blood sugar level will be checked 1 hour later  If your blood sugar is higher than a certain level, another test will be ordered  You will fast and your blood sugar level will be tested   You will have the glucose drink  Your blood will be tested again 1 hour, 2 hours, and 3 hours after you finish the glucose drink  · Fundal height  is a measurement of your uterus to check your baby's growth  This number is usually the same as the number of weeks that you have been pregnant  Your healthcare provider may also check your baby's position  · Your baby's heart rate  will be checked  When should I seek immediate care? · You develop a severe headache that does not go away  · You have new or increased vision changes, such as blurred or spotted vision  · You have new or increased swelling in your face or hands  · You have vaginal spotting or bleeding  · Your water broke or you feel warm water gushing or trickling from your vagina  When should I call my doctor or obstetrician? · You have more than 5 contractions in 1 hour  · You notice any changes in your baby's movements  · You have abdominal cramps, pressure, or tightening  · You have a change in vaginal discharge  · You have chills or a fever  · You have vaginal itching, burning, or pain  · You have yellow, green, white, or foul-smelling vaginal discharge  · You have pain or burning when you urinate, less urine than usual, or pink or bloody urine  · You have questions or concerns about your condition or care  CARE AGREEMENT:   You have the right to help plan your care  Learn about your health condition and how it may be treated  Discuss treatment options with your healthcare providers to decide what care you want to receive  You always have the right to refuse treatment  The above information is an  only  It is not intended as medical advice for individual conditions or treatments  Talk to your doctor, nurse or pharmacist before following any medical regimen to see if it is safe and effective for you    © Copyright [employer] 2022 Information is for End User's use only and may not be sold, redistributed or otherwise used for commercial purposes   All illustrations and images included in CareNotes® are the copyrighted property of A D A M , Inc  or [facility] [place]

## 2022-05-09 ENCOUNTER — ROUTINE PRENATAL (OUTPATIENT)
Dept: OBGYN CLINIC | Facility: MEDICAL CENTER | Age: 22
End: 2022-05-09

## 2022-05-09 VITALS
WEIGHT: 182 LBS | DIASTOLIC BLOOD PRESSURE: 62 MMHG | BODY MASS INDEX: 29.25 KG/M2 | HEIGHT: 66 IN | SYSTOLIC BLOOD PRESSURE: 114 MMHG

## 2022-05-09 DIAGNOSIS — O44.43 LOW LYING PLACENTA NOS OR WITHOUT HEMORRHAGE, THIRD TRIMESTER: ICD-10-CM

## 2022-05-09 DIAGNOSIS — O99.013 ANEMIA DURING PREGNANCY IN THIRD TRIMESTER: ICD-10-CM

## 2022-05-09 DIAGNOSIS — F32.A DEPRESSION DURING PREGNANCY IN THIRD TRIMESTER: Primary | ICD-10-CM

## 2022-05-09 DIAGNOSIS — Z3A.29 29 WEEKS GESTATION OF PREGNANCY: ICD-10-CM

## 2022-05-09 DIAGNOSIS — O99.343 DEPRESSION DURING PREGNANCY IN THIRD TRIMESTER: Primary | ICD-10-CM

## 2022-05-09 PROCEDURE — PNV: Performed by: NURSE PRACTITIONER

## 2022-05-09 RX ORDER — FERROUS SULFATE TAB EC 324 MG (65 MG FE EQUIVALENT) 324 (65 FE) MG
324 TABLET DELAYED RESPONSE ORAL EVERY OTHER DAY
Qty: 90 TABLET | Refills: 1 | Status: SHIPPED | OUTPATIENT
Start: 2022-05-09 | End: 2022-07-28 | Stop reason: ALTCHOICE

## 2022-05-09 NOTE — PROGRESS NOTES
Denies loss of fluid, vaginal bleeding and abdominal pain  Confirms frequent fetal movement  Tolerating prenatal vitamin well  Reviewed 28 week labs significant for maternal anemia  Reviewed iron every other day with orange juice on an empty stomach  Reviewed rechecking CBC in 4 weeks  [place]  Has follow-up with  Center for low-lying placenta  Requesting referral for therapist   States she is having difficulty currently in her relationship  Denies thoughts of self-harm or harm to others  States she also had postpartum depression after last delivery  Patient plans include open epidural as needed, no circumcision for infant, OCPs for postpartum contraception and breastfeeding  Has breast pump ordered  BP: 114/62  Weight: +33lb  Plan  -continue prenatal vitamins daily  -fetal kick counts reviewed, encouraged daily and written information provided  -maternal anemia reviewed iron every other day on an empty stomach with orange juice  Encouraged increase iron in diet  Will recheck in 4 weeks, CBC ordered today  -depression pregnancy-referral placed for KENA Mathews  Patient encouraged to call office with worsening symptoms, thoughts of self-harm or harm to others  -low-lying placenta follow-up with  Center scheduled for 22  -common discomforts of pregnancy and precautions including  labor reviewed  Signs and symptoms report reviewed    Written information provided about COVID 19  RTO 2 weeks f/u depression No indicators present

## 2022-05-13 ENCOUNTER — NURSE TRIAGE (OUTPATIENT)
Dept: OTHER | Facility: OTHER | Age: 22
End: 2022-05-13

## 2022-05-13 ENCOUNTER — HOSPITAL ENCOUNTER (OUTPATIENT)
Facility: HOSPITAL | Age: 22
Discharge: HOME | End: 2022-05-13
Attending: OBSTETRICS & GYNECOLOGY | Admitting: OBSTETRICS & GYNECOLOGY
Payer: COMMERCIAL

## 2022-05-13 VITALS
RESPIRATION RATE: 17 BRPM | SYSTOLIC BLOOD PRESSURE: 133 MMHG | HEIGHT: 66 IN | DIASTOLIC BLOOD PRESSURE: 60 MMHG | WEIGHT: 179 LBS | BODY MASS INDEX: 28.77 KG/M2 | TEMPERATURE: 98.7 F | HEART RATE: 100 BPM

## 2022-05-13 PROBLEM — O26.899 ABDOMINAL PAIN AFFECTING PREGNANCY, ANTEPARTUM: Status: ACTIVE | Noted: 2022-05-13

## 2022-05-13 PROBLEM — O36.8130 DECREASED FETAL MOVEMENTS IN THIRD TRIMESTER: Status: ACTIVE | Noted: 2022-05-13

## 2022-05-13 PROBLEM — Z3A.30 30 WEEKS GESTATION OF PREGNANCY: Status: ACTIVE | Noted: 2022-01-31

## 2022-05-13 PROBLEM — R10.9 ABDOMINAL PAIN AFFECTING PREGNANCY, ANTEPARTUM: Status: ACTIVE | Noted: 2022-05-13

## 2022-05-13 LAB
BACTERIA UR QL AUTO: ABNORMAL /HPF
BILIRUB UR QL STRIP: NEGATIVE
CLARITY UR: CLEAR
COLOR UR: YELLOW
GLUCOSE UR STRIP-MCNC: NEGATIVE MG/DL
HGB UR QL STRIP.AUTO: NEGATIVE
KETONES UR STRIP-MCNC: NEGATIVE MG/DL
LEUKOCYTE ESTERASE UR QL STRIP: ABNORMAL
NITRITE UR QL STRIP: NEGATIVE
NON-SQ EPI CELLS URNS QL MICRO: ABNORMAL /HPF
PH UR STRIP.AUTO: 6 [PH]
PROT UR STRIP-MCNC: NEGATIVE MG/DL
RBC #/AREA URNS AUTO: ABNORMAL /HPF
SP GR UR STRIP.AUTO: 1.01 (ref 1–1.03)
UROBILINOGEN UR QL STRIP.AUTO: 0.2 E.U./DL
WBC #/AREA URNS AUTO: ABNORMAL /HPF

## 2022-05-13 PROCEDURE — 76817 TRANSVAGINAL US OBSTETRIC: CPT | Performed by: OBSTETRICS & GYNECOLOGY

## 2022-05-13 PROCEDURE — 81001 URINALYSIS AUTO W/SCOPE: CPT | Performed by: OBSTETRICS & GYNECOLOGY

## 2022-05-13 PROCEDURE — 76815 OB US LIMITED FETUS(S): CPT | Performed by: OBSTETRICS & GYNECOLOGY

## 2022-05-13 PROCEDURE — 99213 OFFICE O/P EST LOW 20 MIN: CPT

## 2022-05-13 PROCEDURE — NC001 PR NO CHARGE: Performed by: OBSTETRICS & GYNECOLOGY

## 2022-05-13 NOTE — TELEPHONE ENCOUNTER
Patient called in to report moderate cramping, hardness and tightening of abdomen with movement of any kind; nothing when sitting or laying  Patient reports she does feel the baby move  On call provider questioned if they were timeable contractions; if not, for patient to hydrate and use Tylenol  Patient reports they are not timeable contractions and will hydrate and use tylenol  Patient advised to call back if anything changes over the weekend  Reason for Disposition   [1] MODERATE pain (e g , interferes with normal activities) AND [2] pain comes and goes (cramps) AND [3] present > 24 hours  (Exception: pain with Vomiting or Diarrhea - see that Guideline)    Answer Assessment - Initial Assessment Questions  1  LOCATION: "Where does it hurt?"       Lower abdomen    2  RADIATION: "Does the pain shoot anywhere else?" (e g , chest, back)     Denies    3  ONSET: "When did the pain begin?" (e g , minutes, hours or days ago)       5/12/22    4  SUDDEN: "Gradual or sudden onset?"      Suddenly; especially when patient is moving    5  PATTERN "Does the pain come and go, or is it constant?"     - If constant: "Is it getting better, staying the same, or worsening?"       (Note: Constant means the pain never goes away completely; most serious pain is constant and it progresses)      - If intermittent: "How long does it last?" "Do you have pain now?"      (Note: Intermittent means the pain goes away completely between bouts)      Constant tightness, hardness; increased pain with movement    6  SEVERITY: "How bad is the pain?"  (e g , Scale 1-10; mild, moderate, or severe)    - MILD (1-3): doesn't interfere with normal activities, abdomen soft and not tender to touch     - MODERATE (4-7): interferes with normal activities or awakens from sleep, tender to touch     - SEVERE (8-10): excruciating pain, doubled over, unable to do any normal activities      Moderate pain when moving around    7   RECURRENT SYMPTOM: "Have you ever had this type of stomach pain before?" If Yes, ask: "When was the last time?" and "What happened that time?"       Denies    8  CAUSE: "What do you think is causing the stomach pain?"      Unknown; possible  labor    9  RELIEVING/AGGRAVATING FACTORS: "What makes it better or worse?" (e g , movement, antacids, bowel movement)     Sitting, laying-not moving; moving aggravates cramping and hardness    10  OTHER SYMPTOMS: "Has there been any vomiting, diarrhea, constipation, or urine problems?"        Denies; feels baby moving     11   PREGNANCY: "Is there any chance you are pregnant?" "When was your last menstrual period?"        Yes, 71/2 months    Protocols used: ABDOMINAL PAIN - [facility]

## 2022-05-14 ENCOUNTER — HOSPITAL ENCOUNTER (OUTPATIENT)
Facility: HOSPITAL | Age: 22
End: 2022-05-14
Attending: OBSTETRICS & GYNECOLOGY | Admitting: OBSTETRICS & GYNECOLOGY
Payer: COMMERCIAL

## 2022-05-14 NOTE — TELEPHONE ENCOUNTER
Reason for Disposition   MODERATE-SEVERE abdominal pain (e g , interferes with normal activities, awakens from sleep)    Answer Assessment - Initial Assessment Questions  1  LOCATION: "Where does it hurt?"      Whole, mostly lower in abdominal pain   2  RADIATION: "Does the pain shoot anywhere else?" (e g , chest, back)     Back pain   3  ONSET: "When did the pain begin?" (Minutes, hours or days ago)      5/12/22  4  ONSET: "Gradual or sudden onset?"      Sudden   5  PATTERN: "Does the pain come and go, or has it been constant since it started?"       Intermittent   6  SEVERITY: "How bad is the pain?" "What does it keep you from doing?"  (e g , Scale 1-10; mild, moderate, or severe)    - MILD (1-3): doesn't interfere with normal activities, abdomen soft and not tender to touch     - MODERATE (4-7): interferes with normal activities or awakens from sleep, tender to touch     - SEVERE (8-10): excruciating pain, doubled over, unable to do any normal activities     Intermittent 5/10  7  RECURRENT SYMPTOM: "Have you ever had this type of stomach pain before?" If Yes, ask: "When was the last time?" and "What happened that time?"       Denies   8  CAUSE: "What do you think is causing the stomach pain? Unsure   9  RELIEVING/AGGRAVATING FACTORS: "What makes it better or worse?" (e g , antacids, bowel movement, movement)      Moving increased pain  10  FETAL MOVEMENT: "Has the baby's movement decreased or changed significantly from normal?"        Decreased   11  OTHER SYMPTOMS: "Has there been any vaginal bleeding, fever, vomiting, diarrhea, or urine problems?"       Abdominal hardening (constant), pressure   12   LUANNE: "What date are you expecting to deliver?"        7/20/22    Protocols used: PREGNANCY - ABDOMINAL PAIN GREATER THAN 20 WEEKS EGA-ADULT-

## 2022-05-14 NOTE — PROCEDURES
Ladi Huang, a [id number] at 30w4d with an LUANNE of 7/18/2022, by Last Menstrual Period, was seen at [facility] for the following procedure(s): $Procedure Type: US - Transvaginal]         4 Quadrant JENNIFER  JENNIFER Q1 (cm): 4 9 cm  JENNIFER Q2 (cm): 2 7 cm  JENNIFER Q3 (cm): 2 8 cm  JENNIFER Q4 (cm): 1 2 cm  JENNIFER TOTAL (cm): 11 6 cm         Ultrasound Other  Fetal Presentation: Vertex  Cervical Length: 3 96  Funnel: No  Placenta Location: Anterior         Ultrasound Probe Disinfection    A transvaginal ultrasound was performed     Prior to use, disinfection was performed with High Level Disinfection Process (Trophon)  Probe serial number SLA-LD: [id number] was used    Rolan Rios MD  05/13/22  11:07 PM

## 2022-05-14 NOTE — PROGRESS NOTES
L&D Triage Note - OB/GYN  Jacques Ochoa 19 y o  female MRN: [id number]  Unit/Bed#: L&D 322-01 Encounter: [id number]    [facility]    SUBJECTIVE    LUANNE: Estimated Date of Delivery: 7/18/22    HPI:  19 y o  [id number] 30w4d presents with complaint of abdominal pain and decreased fetal movement  She states that she started having pelvic cramping and "abdominal tightening that didn't feel like Edouard-Thao"  She has been working more and thinks that is not helping her  She reports adequate hydration  Denies fever, chills, dysuria, abnormal discharge, vaginal bleeding, diarrhea  She does report constipation and has been taking colace  "I'm taking iron, my stool looks black"  She did have a BM today and did feel better  She reports the baby has been moving but with less intensity  She is feeling her baby now in triage  She was told at last [address] she has a low lying placenta  ROS:  Constitutional: Negative  Respiratory: Negative  Cardiovascular: Negative    Gastrointestinal: Negative    OBJECTIVE:  /60 (BP Location: Right arm)   Pulse 100   Temp 98 7 °F (37 1 °C) (Oral)   Resp 17   Ht 5' 6" (1 676 m)   Wt 81 2 kg (179 lb)   LMP 10/11/2021   BMI 28 89 kg/m²   Body mass index is 28 89 kg/m²  Physical Exam  Constitutional:       Appearance: Normal appearance  Pulmonary:      Effort: Pulmonary effort is normal    Abdominal:      Palpations: Abdomen is soft  Tenderness: There is no abdominal tenderness  There is no guarding or rebound  Comments: Gravid  Abdomen is not tightening   Neurological:      Mental Status: She is alert  Speculum exam: normal external female genitalia  No abnormal discharge noted in the vagina  Pooling negative  Parous cervix, thick and closed       [address]:     Infection:   - neg clue cells    - neg hyphae   - neg trichomonads present    Membrane status   - neg ferning   - neg nitrazene   - neg pooling     FHT:  Baseline Rate: 140 bpm  Variability: Moderate 6-25 bpm  Accelerations: 15 x 15 or greater  Decelerations: None    TOCO:   Contraction Frequency (minutes): 0  Contraction Duration (seconds): 0  Contraction Quality: Not applicable    IMAGING:       TVUS   Cervical length         - 3 96cm   Presentation: vertex         TAUS   JENNIFER      - Q1 4 89cm     - Q2 2 7cm     - Q3 2 8cm     - Q4 1 18cm     - Total: 11 6cm   Placenta: anterior   Presentation: vertex    Labs:   Recent Results (from the past 24 hour(s))   Urinalysis with microscopic    Collection Time: 22 11:03 PM   Result Value Ref Range    Clarity, UA Clear     Color, UA Yellow     Specific [place], UA 1 010 1 003 - 1 030    pH, UA 6 0 4 5, 5 0, 5 5, 6 0, 6 5, 7 0, 7 5, 8 0    Glucose, UA Negative Negative mg/dl    Ketones, UA Negative Negative mg/dl    Blood, UA Negative Negative    Protein, UA Negative Negative mg/dl    Nitrite, UA Negative Negative    Bilirubin, UA Negative Negative    Urobilinogen, UA 0 2 0 2, 1 0 E U /dl E U /dl    Leukocytes, UA Small (A) Negative    WBC, UA 10-20 (A) None Seen, 2-4, 5-60 /hpf    RBC, UA None Seen None Seen, 2-4 /hpf    Bacteria, UA Moderate (A) None Seen, Occasional /hpf    Epithelial Cells Moderate (A) None Seen, Occasional /hpf         ASSESSMENT  Karlene Arboleda is a 19 y o  [id number] at 30w4d with abdominal pain   labor ruled out  No evidence of contractions on tocometry  Cervical length is WNL  No infection in the vagina  UA negative  NST is reactive and fluid is appropriate  She is now feeling baby moving  Suspect component of constipation with abdominal pain  Discussed bowel regimen with mirilax and colace  Increase fluid intake  PLAN  #1  R/o  labor  · Speculum exam   · Wet mount/ KOH   · TVUS CL   · UA    #2   Decreased Fetal movement  · NST  · JENNIFER      Discharge instructions  · Patient instructed to call if experiencing worsening contractions, vaginal bleeding, loss of fluid or decreased fetal movement  · Will follow up with OBGYN as scheduled      D/w Dr Dani Morrow   OB/GYN PGY-2  5/13/2022  11:44 PM

## 2022-05-14 NOTE — TELEPHONE ENCOUNTER
[id number], LUANNE: 7/20/22, Pregnancy 2nd  c/o intermittent moderate (5/10) lower abdominal cramping onset 5/12, and worsening today to whole abdomen and lower back, also reports notable constant hard abdomen, decreased fetal movement, and increased pressure  No additional symptoms  On call provider contacted and informed of patients concerns and status  Provider recommends proceeding to L&D for evaluation  Patient informed of providers recommendation  Verbalized understanding and agreeable with disposition  No further questions  [place] L&D charge nurse notified

## 2022-05-14 NOTE — TELEPHONE ENCOUNTER
Regarding: Calling back w/ sharp lowerab pain/ decreased fetal movement/ not getting better, 7 5 months   ----- Message from Clerance Gitelman, [address] sent at 5/13/2022  9:13 PM EDT -----  "I called earlier, 7 5 month pregnant, with some decreased fetal movement, no discharge or spotting, states that she is getting very sharp pain in her lower abdomen and doesn't feel like Garth Boateng  She has been boring a lot of physical activity at work and has been under a lof of family stress  I feel pain now every time I bend down or move   Hardness and and cramping is really freaking me out "

## 2022-05-23 PROBLEM — Z3A.32 32 WEEKS GESTATION OF PREGNANCY: Status: ACTIVE | Noted: 2022-01-31

## 2022-05-24 ENCOUNTER — OFFICE VISIT (OUTPATIENT)
Dept: URGENT CARE | Facility: MEDICAL CENTER | Age: 22
End: 2022-05-24
Payer: COMMERCIAL

## 2022-05-24 ENCOUNTER — ROUTINE PRENATAL (OUTPATIENT)
Dept: OBGYN CLINIC | Facility: MEDICAL CENTER | Age: 22
End: 2022-05-24

## 2022-05-24 ENCOUNTER — HOSPITAL ENCOUNTER (EMERGENCY)
Facility: HOSPITAL | Age: 22
Discharge: HOME | End: 2022-05-24
Attending: EMERGENCY MEDICINE
Payer: COMMERCIAL

## 2022-05-24 ENCOUNTER — APPOINTMENT (OUTPATIENT)
Dept: LAB | Facility: MEDICAL CENTER | Age: 22
End: 2022-05-24
Payer: COMMERCIAL

## 2022-05-24 ENCOUNTER — APPOINTMENT (EMERGENCY)
Dept: CT IMAGING | Facility: HOSPITAL | Age: 22
End: 2022-05-24
Payer: COMMERCIAL

## 2022-05-24 ENCOUNTER — APPOINTMENT (EMERGENCY)
Dept: MRI IMAGING | Facility: HOSPITAL | Age: 22
End: 2022-05-24
Payer: COMMERCIAL

## 2022-05-24 VITALS
TEMPERATURE: 98.3 F | WEIGHT: 184 LBS | BODY MASS INDEX: 29.57 KG/M2 | RESPIRATION RATE: 16 BRPM | HEART RATE: 88 BPM | OXYGEN SATURATION: 100 % | HEIGHT: 66 IN | DIASTOLIC BLOOD PRESSURE: 62 MMHG | SYSTOLIC BLOOD PRESSURE: 116 MMHG

## 2022-05-24 VITALS
BODY MASS INDEX: 29.57 KG/M2 | SYSTOLIC BLOOD PRESSURE: 112 MMHG | DIASTOLIC BLOOD PRESSURE: 62 MMHG | WEIGHT: 184 LBS | HEIGHT: 66 IN

## 2022-05-24 VITALS
DIASTOLIC BLOOD PRESSURE: 62 MMHG | RESPIRATION RATE: 18 BRPM | OXYGEN SATURATION: 100 % | HEIGHT: 66 IN | TEMPERATURE: 97.6 F | BODY MASS INDEX: 29.7 KG/M2 | HEART RATE: 102 BPM | SYSTOLIC BLOOD PRESSURE: 114 MMHG

## 2022-05-24 DIAGNOSIS — O99.343 DEPRESSION DURING PREGNANCY IN THIRD TRIMESTER: Primary | ICD-10-CM

## 2022-05-24 DIAGNOSIS — R00.2 PALPITATIONS: ICD-10-CM

## 2022-05-24 DIAGNOSIS — R51.9 HEADACHE: Primary | ICD-10-CM

## 2022-05-24 DIAGNOSIS — R42 DIZZINESS: Primary | ICD-10-CM

## 2022-05-24 DIAGNOSIS — Z3A.32 32 WEEKS GESTATION OF PREGNANCY: ICD-10-CM

## 2022-05-24 DIAGNOSIS — O99.013 ANEMIA DURING PREGNANCY IN THIRD TRIMESTER: ICD-10-CM

## 2022-05-24 DIAGNOSIS — F32.A DEPRESSION DURING PREGNANCY IN THIRD TRIMESTER: Primary | ICD-10-CM

## 2022-05-24 DIAGNOSIS — Z3A.29 29 WEEKS GESTATION OF PREGNANCY: ICD-10-CM

## 2022-05-24 DIAGNOSIS — R42 DIZZINESS: ICD-10-CM

## 2022-05-24 DIAGNOSIS — O44.43 LOW LYING PLACENTA NOS OR WITHOUT HEMORRHAGE, THIRD TRIMESTER: ICD-10-CM

## 2022-05-24 PROBLEM — G43.909 MIGRAINE HEADACHE: Status: ACTIVE | Noted: 2022-05-24

## 2022-05-24 LAB
ALBUMIN SERPL BCP-MCNC: 2.6 G/DL (ref 3.5–5)
ALBUMIN SERPL BCP-MCNC: 2.9 G/DL (ref 3.5–5)
ALP SERPL-CCNC: 71 U/L (ref 46–116)
ALP SERPL-CCNC: 77 U/L (ref 46–116)
ALT SERPL W P-5'-P-CCNC: 16 U/L (ref 12–78)
ALT SERPL W P-5'-P-CCNC: 18 U/L (ref 12–78)
ANION GAP SERPL CALCULATED.3IONS-SCNC: 10 MMOL/L (ref 4–13)
ANION GAP SERPL CALCULATED.3IONS-SCNC: 5 MMOL/L (ref 4–13)
APTT PPP: 28 SECONDS (ref 23–37)
AST SERPL W P-5'-P-CCNC: 19 U/L (ref 5–45)
AST SERPL W P-5'-P-CCNC: 34 U/L (ref 5–45)
ATRIAL RATE: 80 BPM
BASOPHILS # BLD MANUAL: 0 THOUSAND/UL (ref 0–0.1)
BASOPHILS # BLD MANUAL: 0.12 THOUSAND/UL (ref 0–0.1)
BASOPHILS NFR MAR MANUAL: 0 % (ref 0–1)
BASOPHILS NFR MAR MANUAL: 1 % (ref 0–1)
BILIRUB SERPL-MCNC: 0.24 MG/DL (ref 0.2–1)
BILIRUB SERPL-MCNC: 0.4 MG/DL (ref 0.2–1)
BILIRUB UR QL STRIP: NEGATIVE
BUN SERPL-MCNC: 6 MG/DL (ref 5–25)
BUN SERPL-MCNC: 6 MG/DL (ref 5–25)
CALCIUM ALBUM COR SERPL-MCNC: 10 MG/DL (ref 8.3–10.1)
CALCIUM ALBUM COR SERPL-MCNC: 9.4 MG/DL (ref 8.3–10.1)
CALCIUM SERPL-MCNC: 8.3 MG/DL (ref 8.3–10.1)
CALCIUM SERPL-MCNC: 9.1 MG/DL (ref 8.3–10.1)
CHLORIDE SERPL-SCNC: 104 MMOL/L (ref 100–108)
CHLORIDE SERPL-SCNC: 109 MMOL/L (ref 100–108)
CLARITY UR: NORMAL
CO2 SERPL-SCNC: 22 MMOL/L (ref 21–32)
CO2 SERPL-SCNC: 25 MMOL/L (ref 21–32)
COLOR UR: YELLOW
CREAT SERPL-MCNC: 0.31 MG/DL (ref 0.6–1.3)
CREAT SERPL-MCNC: 0.49 MG/DL (ref 0.6–1.3)
CREAT UR-MCNC: 28.6 MG/DL
EOSINOPHIL # BLD MANUAL: 0 THOUSAND/UL (ref 0–0.4)
EOSINOPHIL # BLD MANUAL: 0.25 THOUSAND/UL (ref 0–0.4)
EOSINOPHIL NFR BLD MANUAL: 0 % (ref 0–6)
EOSINOPHIL NFR BLD MANUAL: 2 % (ref 0–6)
ERYTHROCYTE [DISTWIDTH] IN BLOOD BY AUTOMATED COUNT: 13.8 % (ref 11.6–15.1)
ERYTHROCYTE [DISTWIDTH] IN BLOOD BY AUTOMATED COUNT: 13.9 % (ref 11.6–15.1)
GFR SERPL CREATININE-BSD FRML MDRD: 137 ML/MIN/1.73SQ M
GFR SERPL CREATININE-BSD FRML MDRD: 160 ML/MIN/1.73SQ M
GIANT PLATELETS BLD QL SMEAR: PRESENT
GLUCOSE SERPL-MCNC: 109 MG/DL (ref 65–140)
GLUCOSE SERPL-MCNC: 61 MG/DL (ref 65–140)
GLUCOSE SERPL-MCNC: 79 MG/DL (ref 65–140)
GLUCOSE UR STRIP-MCNC: NEGATIVE MG/DL
HCT VFR BLD AUTO: 31.8 % (ref 34.8–46.1)
HCT VFR BLD AUTO: 33 % (ref 34.8–46.1)
HGB BLD-MCNC: 10.2 G/DL (ref 11.5–15.4)
HGB BLD-MCNC: 10.7 G/DL (ref 11.5–15.4)
HGB UR QL STRIP.AUTO: NEGATIVE
INR PPP: 1.03 (ref 0.84–1.19)
KETONES UR STRIP-MCNC: NEGATIVE MG/DL
LEUKOCYTE ESTERASE UR QL STRIP: NEGATIVE
LYMPHOCYTES # BLD AUTO: 1 THOUSAND/UL (ref 0.6–4.47)
LYMPHOCYTES # BLD AUTO: 18 % (ref 14–44)
LYMPHOCYTES # BLD AUTO: 2.17 THOUSAND/UL (ref 0.6–4.47)
LYMPHOCYTES # BLD AUTO: 8 % (ref 14–44)
MCH RBC QN AUTO: 31.1 PG (ref 26.8–34.3)
MCH RBC QN AUTO: 31.4 PG (ref 26.8–34.3)
MCHC RBC AUTO-ENTMCNC: 32.1 G/DL (ref 31.4–37.4)
MCHC RBC AUTO-ENTMCNC: 32.4 G/DL (ref 31.4–37.4)
MCV RBC AUTO: 96 FL (ref 82–98)
MCV RBC AUTO: 98 FL (ref 82–98)
METAMYELOCYTES NFR BLD MANUAL: 3 % (ref 0–1)
MONOCYTES # BLD AUTO: 0.25 THOUSAND/UL (ref 0–1.22)
MONOCYTES # BLD AUTO: 0.36 THOUSAND/UL (ref 0–1.22)
MONOCYTES NFR BLD: 2 % (ref 4–12)
MONOCYTES NFR BLD: 3 % (ref 4–12)
NEUTROPHILS # BLD MANUAL: 10.57 THOUSAND/UL (ref 1.85–7.62)
NEUTROPHILS # BLD MANUAL: 9.41 THOUSAND/UL (ref 1.85–7.62)
NEUTS BAND NFR BLD MANUAL: 12 % (ref 0–8)
NEUTS BAND NFR BLD MANUAL: 6 % (ref 0–8)
NEUTS SEG NFR BLD AUTO: 72 % (ref 43–75)
NEUTS SEG NFR BLD AUTO: 73 % (ref 43–75)
NITRITE UR QL STRIP: NEGATIVE
P AXIS: 23 DEGREES
PH UR STRIP.AUTO: 7.5 [PH] (ref 4.5–8)
PLATELET # BLD AUTO: 213 THOUSANDS/UL (ref 149–390)
PLATELET # BLD AUTO: 241 THOUSANDS/UL (ref 149–390)
PLATELET BLD QL SMEAR: ADEQUATE
PLATELET BLD QL SMEAR: ADEQUATE
PMV BLD AUTO: 10.2 FL (ref 8.9–12.7)
PMV BLD AUTO: 10.3 FL (ref 8.9–12.7)
POTASSIUM SERPL-SCNC: 3.4 MMOL/L (ref 3.5–5.3)
POTASSIUM SERPL-SCNC: 4.1 MMOL/L (ref 3.5–5.3)
PR INTERVAL: 134 MS
PROT SERPL-MCNC: 6.5 G/DL (ref 6.4–8.2)
PROT SERPL-MCNC: 6.9 G/DL (ref 6.4–8.2)
PROT UR STRIP-MCNC: NEGATIVE MG/DL
PROT UR-MCNC: 7 MG/DL
PROT/CREAT UR: 0.24 MG/G{CREAT} (ref 0–0.1)
PROTHROMBIN TIME: 13.3 SECONDS (ref 11.6–14.5)
QRS AXIS: 60 DEGREES
QRSD INTERVAL: 90 MS
QT INTERVAL: 384 MS
QTC INTERVAL: 442 MS
RBC # BLD AUTO: 3.25 MILLION/UL (ref 3.81–5.12)
RBC # BLD AUTO: 3.44 MILLION/UL (ref 3.81–5.12)
RBC MORPH BLD: NORMAL
SODIUM SERPL-SCNC: 136 MMOL/L (ref 136–145)
SODIUM SERPL-SCNC: 139 MMOL/L (ref 136–145)
SP GR UR STRIP.AUTO: 1.02 (ref 1–1.03)
T WAVE AXIS: 26 DEGREES
TSH SERPL DL<=0.05 MIU/L-ACNC: 1.99 UIU/ML (ref 0.45–4.5)
UROBILINOGEN UR QL STRIP.AUTO: 0.2 E.U./DL
VENTRICULAR RATE: 80 BPM
WBC # BLD AUTO: 12.07 THOUSAND/UL (ref 4.31–10.16)
WBC # BLD AUTO: 12.44 THOUSAND/UL (ref 4.31–10.16)

## 2022-05-24 PROCEDURE — 96375 TX/PRO/DX INJ NEW DRUG ADDON: CPT

## 2022-05-24 PROCEDURE — 70450 CT HEAD/BRAIN W/O DYE: CPT

## 2022-05-24 PROCEDURE — 80053 COMPREHEN METABOLIC PANEL: CPT

## 2022-05-24 PROCEDURE — 85610 PROTHROMBIN TIME: CPT | Performed by: EMERGENCY MEDICINE

## 2022-05-24 PROCEDURE — 96368 THER/DIAG CONCURRENT INF: CPT

## 2022-05-24 PROCEDURE — 85007 BL SMEAR W/DIFF WBC COUNT: CPT | Performed by: EMERGENCY MEDICINE

## 2022-05-24 PROCEDURE — 85027 COMPLETE CBC AUTOMATED: CPT | Performed by: EMERGENCY MEDICINE

## 2022-05-24 PROCEDURE — 96365 THER/PROPH/DIAG IV INF INIT: CPT

## 2022-05-24 PROCEDURE — G1004 CDSM NDSC: HCPCS

## 2022-05-24 PROCEDURE — 85007 BL SMEAR W/DIFF WBC COUNT: CPT

## 2022-05-24 PROCEDURE — 85730 THROMBOPLASTIN TIME PARTIAL: CPT | Performed by: EMERGENCY MEDICINE

## 2022-05-24 PROCEDURE — 36415 COLL VENOUS BLD VENIPUNCTURE: CPT

## 2022-05-24 PROCEDURE — 99213 OFFICE O/P EST LOW 20 MIN: CPT | Performed by: EMERGENCY MEDICINE

## 2022-05-24 PROCEDURE — 96367 TX/PROPH/DG ADDL SEQ IV INF: CPT

## 2022-05-24 PROCEDURE — 84443 ASSAY THYROID STIM HORMONE: CPT

## 2022-05-24 PROCEDURE — 85027 COMPLETE CBC AUTOMATED: CPT

## 2022-05-24 PROCEDURE — 84156 ASSAY OF PROTEIN URINE: CPT | Performed by: EMERGENCY MEDICINE

## 2022-05-24 PROCEDURE — 87086 URINE CULTURE/COLONY COUNT: CPT

## 2022-05-24 PROCEDURE — 99284 EMERGENCY DEPT VISIT MOD MDM: CPT | Performed by: PSYCHIATRY & NEUROLOGY

## 2022-05-24 PROCEDURE — PNV: Performed by: NURSE PRACTITIONER

## 2022-05-24 PROCEDURE — 93041 RHYTHM ECG TRACING: CPT | Performed by: EMERGENCY MEDICINE

## 2022-05-24 PROCEDURE — 80053 COMPREHEN METABOLIC PANEL: CPT | Performed by: EMERGENCY MEDICINE

## 2022-05-24 PROCEDURE — 99284 EMERGENCY DEPT VISIT MOD MDM: CPT | Performed by: EMERGENCY MEDICINE

## 2022-05-24 PROCEDURE — 70544 MR ANGIOGRAPHY HEAD W/O DYE: CPT

## 2022-05-24 PROCEDURE — 85025 COMPLETE CBC W/AUTO DIFF WBC: CPT | Performed by: EMERGENCY MEDICINE

## 2022-05-24 PROCEDURE — 82948 REAGENT STRIP/BLOOD GLUCOSE: CPT | Performed by: EMERGENCY MEDICINE

## 2022-05-24 PROCEDURE — 99285 EMERGENCY DEPT VISIT HI MDM: CPT

## 2022-05-24 PROCEDURE — 96366 THER/PROPH/DIAG IV INF ADDON: CPT

## 2022-05-24 PROCEDURE — 82570 ASSAY OF URINE CREATININE: CPT | Performed by: EMERGENCY MEDICINE

## 2022-05-24 PROCEDURE — 81003 URINALYSIS AUTO W/O SCOPE: CPT

## 2022-05-24 PROCEDURE — 70547 MR ANGIOGRAPHY NECK W/O DYE: CPT

## 2022-05-24 RX ORDER — MAGNESIUM SULFATE HEPTAHYDRATE 40 MG/ML
2 INJECTION, SOLUTION INTRAVENOUS ONCE
Status: COMPLETED | OUTPATIENT
Start: 2022-05-24 | End: 2022-05-24

## 2022-05-24 RX ORDER — METOCLOPRAMIDE HYDROCHLORIDE 5 MG/ML
10 INJECTION INTRAMUSCULAR; INTRAVENOUS ONCE
Status: COMPLETED | OUTPATIENT
Start: 2022-05-24 | End: 2022-05-24

## 2022-05-24 RX ORDER — METOCLOPRAMIDE 10 MG/1
10 TABLET ORAL EVERY 6 HOURS PRN
Qty: 8 TABLET | Refills: 0 | Status: SHIPPED | OUTPATIENT
Start: 2022-05-24 | End: 2022-06-20

## 2022-05-24 RX ORDER — DIPHENHYDRAMINE HCL 25 MG
25 TABLET ORAL ONCE
Status: COMPLETED | OUTPATIENT
Start: 2022-05-24 | End: 2022-05-24

## 2022-05-24 RX ORDER — MECLIZINE HYDROCHLORIDE 25 MG/1
25 TABLET ORAL 3 TIMES DAILY PRN
Qty: 30 TABLET | Refills: 0 | Status: SHIPPED | OUTPATIENT
Start: 2022-05-24 | End: 2022-07-28 | Stop reason: ALTCHOICE

## 2022-05-24 RX ORDER — IBUPROFEN 400 MG/1
400 TABLET ORAL EVERY 6 HOURS PRN
Qty: 12 TABLET | Refills: 0 | Status: SHIPPED | OUTPATIENT
Start: 2022-05-24 | End: 2022-05-24 | Stop reason: CLARIF

## 2022-05-24 RX ORDER — CEPHALEXIN 500 MG/1
500 CAPSULE ORAL 4 TIMES DAILY
Qty: 28 CAPSULE | Refills: 0 | Status: SHIPPED | OUTPATIENT
Start: 2022-05-24 | End: 2022-05-24 | Stop reason: CLARIF

## 2022-05-24 RX ADMIN — SODIUM CHLORIDE, SODIUM LACTATE, POTASSIUM CHLORIDE, AND CALCIUM CHLORIDE 1000 ML: .6; .31; .03; .02 INJECTION, SOLUTION INTRAVENOUS at 11:54

## 2022-05-24 RX ADMIN — MAGNESIUM SULFATE HEPTAHYDRATE 2 G: 40 INJECTION, SOLUTION INTRAVENOUS at 15:17

## 2022-05-24 RX ADMIN — DIPHENHYDRAMINE HCL 25 MG: 25 TABLET, COATED ORAL at 11:53

## 2022-05-24 RX ADMIN — SODIUM CHLORIDE 500 MG: 0.9 INJECTION, SOLUTION INTRAVENOUS at 15:58

## 2022-05-24 RX ADMIN — METOCLOPRAMIDE 10 MG: 5 INJECTION, SOLUTION INTRAMUSCULAR; INTRAVENOUS at 11:55

## 2022-05-24 NOTE — PROGRESS NOTES
Denies loss of fluid, vaginal bleeding and abdominal pain  Confirms frequent fetal movement  Doing fetal kick counts  Tolerating prenatal vitamin and iron well  Patient is complaining today episodes of tachycardia  Patient states she has had them for the past 5-6 years  Felt palpitations yesterday had a "finger EKG" completed at work  Patient states heart rate was as high as 150  Patient states it was sinus tachycardia  Since yesterday has had dizziness almost constant  Is worsened with movement  Is alleviated with lying down  Dizziness is new  Admits to shortness of breath with high heart rate and intermittent nausea  Denies vomiting  Made appointment with therapist   Denies worsening symptoms, thoughts of self-harm or harm others  PE:  Heart rate 86 and pulse ox 98% at rest   Regular rate and rhythm auscultated          Heart rate 107 and pulse ox 99% with movement          Horizontal gaze nystagmus test negative bilaterally  BP: 112/62  Weight: +35lb  Plan  -continue prenatal vitamins daily  -continue fetal kick counts daily  -anemia in pregnancy continue iron every other day on an empty stomach  Repeat CBC with reflex anemia panel provided to patient  Patient encouraged to complete blood work  -dizziness/palpitations-CBC, reflux anemia panel, CMP and TSH ordered  EKG ordered  If no resolution can consider echo and Cardiology consult  Rx meclizine 1 tablet 3 times a day as needed  Reviewed with patient in detail may cause sleepiness can start with half a tablet to see if symptoms are controlled  Signs and symptoms to report reviewed  -therapy scheduled tomorrow  - sent follow-up scheduled 22  -common discomforts of pregnancy and precautions including  labor reviewed  Signs and symptoms to report reviewed    Written information provided about COVID 19  RTO 2 weeks

## 2022-05-24 NOTE — Clinical Note
Dale Rojas was seen and treated in our emergency department on 5/24/2022  No restrictions            Diagnosis:     Annika    She may return on this date: 05/25/2022         If you have any questions or concerns, please don't hesitate to call        Mayco Sparrow MD    ______________________________           _______________          _______________  Hospital Representative                              Date                                Time

## 2022-05-24 NOTE — ED CARE HANDOFF
Emergency Department Sign Out Note        Sign out and transfer of care from dr Pandya Brood  See Separate Emergency Department note  The patient, Karlene Arboleda, was evaluated by the previous provider for headache  Workup Completed:  Imaging studies, ob/neuro consults    ED Course / Workup Pending (followup): Reassessment  Pt ha resolved, will reassure, susana odell to home                                       Procedures  MDM        Disposition  Final diagnoses:   Headache     Time reflects when diagnosis was documented in both MDM as applicable and the Disposition within this note     Time User Action Codes Description Comment    5/24/2022  1:02 PM Laverne [address] [facility] Soft corn     5/24/2022  1:02 PM Contreras Galloway [address] Soft corn left foot    5/24/2022  1:02 PM Tonya Clink Add [L03 116] Cellulitis of left foot     5/24/2022  1:03 PM Tonya Clink Modify [L03 116] Cellulitis of left foot between 4th and 5th toe     5/24/2022  1:38 PM Tonya Clink Modify [L03 116] Cellulitis of left foot between 4th and 5th toe     5/24/2022  1:38 PM Tonya Clink Remove [L84] Soft corn left foot    5/24/2022  1:39 PM Tonya Clink Remove [J57 577] Cellulitis of left foot between 4th and 5th toe     5/24/2022  2:03 PM Tonya Clink Add [R51 9] Headache       ED Disposition     None      Follow-up Information     Follow up With Specialties Details Why Contact Info Additional Information    [facility] Schedule an appointment as soon as possible for a visit in 1 week  [address] [address] [phone]  [address] [place] [address] Jb Chun [address], [address], [place], [place], [address] [facility]        Current Discharge Medication List      CONTINUE these medications which have NOT CHANGED    Details   Docusate Sodium (COLACE PO) Take by mouth if needed      ferrous sulfate 324 (65 Fe) mg Take 1 tablet (324 mg total) by mouth every other day  Qty: 90 tablet, Refills: 1    Associated Diagnoses: 29 weeks gestation of pregnancy; Anemia during pregnancy in third trimester      meclizine (ANTIVERT) 25 mg tablet Take 1 tablet (25 mg total) by mouth as needed in the morning and 1 tablet (25 mg total) as needed at noon and 1 tablet (25 mg total) as needed in the evening for dizziness  Qty: 30 tablet, Refills: 0    Associated Diagnoses: 32 weeks gestation of pregnancy; Dizziness      Prenatal Vit-Fe Fumarate-FA (PRENATAL VITAMIN PO)            No discharge procedures on file         ED Provider  Electronically Signed by     Lynda Zavaleta MD  05/24/22 4276

## 2022-05-24 NOTE — ED PROVIDER NOTES
History  Chief Complaint   Patient presents with    Dizziness     Arrives EMS from urgent care reporting dizziness starting yesterday with HA and nausea  Denies CP  States " I have SOB but that isnt new"  Pt 32wk pregnant  Denies abdominal pain  History provided by:  Patient and medical records   used: No    Medical Problem - Major  Location:  After waking up but still in bed she developed visual changes described as looking through colitis go, then developed nausea then a frontal/generalized headache as well as neck pain a gradually worsened and a sense of dizziness which is described as a sense of motion  Thirty-two weeks pregnant  No pregnancy-related problems or complaints presently  Quality:  She was at the obstetrician's office this morning and was sent to the Urgent Care and then sent her here  Severity:  Severe  Onset quality:  Gradual  Duration:  1 day  Timing:  Constant  Progression:  Worsening  Chronicity:  New  Relieved by:  Nothing  Worsened by: Movement will sometimes make this worse  Ineffective treatments:  None tried  Associated symptoms: headaches and nausea    Associated symptoms: no abdominal pain, no chest pain, no congestion, no cough, no diarrhea, no fever, no shortness of breath, no sore throat and no vomiting        Prior to Admission Medications   Prescriptions Last Dose Informant Patient Reported? Taking? Docusate Sodium (COLACE PO)   Yes No   Sig: Take by mouth if needed   Prenatal Vit-Fe Fumarate-FA (PRENATAL VITAMIN PO)  Self Yes No   ferrous sulfate 324 (65 Fe) mg   No No   Sig: Take 1 tablet (324 mg total) by mouth every other day   meclizine (ANTIVERT) 25 mg tablet   No No   Sig: Take 1 tablet (25 mg total) by mouth as needed in the morning and 1 tablet (25 mg total) as needed at noon and 1 tablet (25 mg total) as needed in the evening for dizziness        Facility-Administered Medications: None       Past Medical History:   Diagnosis Date    Depression 2011    History of transfusion     2014    Varicella     vaccine       Past Surgical History:   Procedure Laterality Date    SKIN GRAFT Left     left elbow    SKIN GRAFT      left elbow       Family History   Problem Relation Age of Onset    Hypothyroidism Mother     Bipolar disorder Mother     Mental illness Mother     Thyroid disease Mother         Hypothyroidism    Schizophrenia Mother     No Known Problems Father     Cancer Maternal Grandmother         Breast cancer    Heart disease Maternal Grandmother     Breast cancer Maternal Grandmother     Lung cancer Maternal Grandfather     Cancer Maternal Grandfather         Lung cancer    Emphysema Maternal Grandfather     Asperger's syndrome Brother     Addiction problem Brother     No Known Problems Son     Varicose Veins Paternal Grandmother     Diabetes Paternal Grandfather     Depression Half-Sister     No Known Problems Brother     Colon cancer Neg Hx     Ovarian cancer Neg Hx      I have reviewed and agree with the history as documented  E-Cigarette/Vaping    E-Cigarette Use Never User      E-Cigarette/Vaping Substances    Nicotine No     THC No     CBD No     Flavoring No     Other No     Unknown No      Social History     Tobacco Use    Smoking status: Never Smoker    Smokeless tobacco: Never Used   Vaping Use    Vaping Use: Never used   Substance Use Topics    Alcohol use: Not Currently     Alcohol/week: 2 0 standard drinks     Types: 2 Glasses of wine per week    Drug use: No       Review of Systems   Constitutional: Negative for chills and fever  HENT: Negative for congestion and sore throat  Eyes: Positive for photophobia and visual disturbance  Negative for pain, discharge and redness  Respiratory: Negative for cough, chest tightness and shortness of breath  Cardiovascular: Negative for chest pain and leg swelling  Gastrointestinal: Positive for nausea   Negative for abdominal pain, diarrhea and vomiting  Genitourinary: Negative for difficulty urinating, dysuria, pelvic pain, vaginal bleeding and vaginal discharge  Musculoskeletal: Negative for back pain and gait problem  Neurological: Positive for dizziness and headaches  Negative for facial asymmetry, speech difficulty, weakness, light-headedness and numbness  All other systems reviewed and are negative  Physical Exam  Physical Exam  Vitals and nursing note reviewed  Constitutional:       General: She is not in acute distress  Appearance: Normal appearance  She is well-developed  She is not ill-appearing, toxic-appearing or diaphoretic  HENT:      Head: Normocephalic and atraumatic  Right Ear: Hearing normal  No drainage or swelling  Left Ear: Hearing normal  No drainage or swelling  Nose: Nose normal       Mouth/Throat:      Mouth: Mucous membranes are moist    Eyes:      General: Lids are normal          Right eye: No discharge  Left eye: No discharge  Extraocular Movements: Extraocular movements intact  Conjunctiva/sclera: Conjunctivae normal       Pupils: Pupils are equal, round, and reactive to light  Comments: Could not get a good view of the fundus in either eye  Neck:      Vascular: No JVD  Trachea: Trachea normal    Cardiovascular:      Rate and Rhythm: Normal rate and regular rhythm  Pulses: Normal pulses  Heart sounds: Normal heart sounds  No murmur heard  No friction rub  No gallop  Pulmonary:      Effort: Pulmonary effort is normal  No respiratory distress  Breath sounds: Normal breath sounds  No stridor  No wheezing or rales  Chest:      Chest wall: No tenderness  Abdominal:      Palpations: Abdomen is soft  Tenderness: There is no abdominal tenderness  There is no guarding or rebound  Musculoskeletal:         General: No tenderness or deformity  Normal range of motion  Cervical back: Normal range of motion and neck supple   No rigidity or tenderness  Lymphadenopathy:      Cervical: No cervical adenopathy  Skin:     General: Skin is warm and dry  Coloration: Skin is not pale  Findings: No rash  Neurological:      General: No focal deficit present  Mental Status: She is alert  GCS: GCS eye subscore is 4  GCS verbal subscore is 5  GCS motor subscore is 6  Cranial Nerves: Cranial nerves are intact  No cranial nerve deficit  Sensory: Sensation is intact  No sensory deficit  Motor: Motor function is intact  No weakness, tremor or abnormal muscle tone  Coordination: Coordination is intact  Finger-Nose-Finger Test normal    Psychiatric:         Speech: Speech normal          Behavior: Behavior is cooperative           Vital Signs  ED Triage Vitals   Temperature Pulse Respirations Blood Pressure SpO2   05/24/22 1038 05/24/22 1035 05/24/22 1035 05/24/22 1035 05/24/22 1035   97 6 °F (36 4 °C) 70 18 129/62 100 %      Temp Source Heart Rate Source Patient Position - Orthostatic VS BP Location FiO2 (%)   05/24/22 1038 05/24/22 1035 05/24/22 1035 05/24/22 1035 --   Oral Monitor Sitting Right arm       Pain Score       05/24/22 1035       7           Vitals:    05/24/22 1035 05/24/22 1220   BP: 129/62 137/70   Pulse: 70 97   Patient Position - Orthostatic VS: Sitting Sitting         Visual Acuity  Visual Acuity    Flowsheet Row Most Recent Value   L Pupil Size (mm) 3   R Pupil Size (mm) 3          ED Medications  Medications   lactated ringers bolus 1,000 mL (1,000 mL Intravenous New Bag 5/24/22 1154)   metoclopramide (REGLAN) injection 10 mg (10 mg Intravenous Given 5/24/22 1155)   diphenhydrAMINE (BENADRYL) tablet 25 mg (25 mg Oral Given 5/24/22 1153)       Diagnostic Studies  Results Reviewed     Procedure Component Value Units Date/Time    Manual Differential(PHLEBS Do Not Order) [[id number]]  (Abnormal) Collected: 05/24/22 1205    Lab Status: Final result Specimen: Blood from Arm, Left Updated: 05/24/22 7050 Segmented % 72 %      Bands % 6 %      Lymphocytes % 18 %      Monocytes % 3 %      Eosinophils, % 0 %      Basophils % 1 %      Absolute Neutrophils 9 41 Thousand/uL      Lymphocytes Absolute 2 17 Thousand/uL      Monocytes Absolute 0 36 Thousand/uL      Eosinophils Absolute 0 00 Thousand/uL      Basophils Absolute 0 12 Thousand/uL      Total Counted --     RBC Morphology Normal     Platelet Estimate Adequate    Comprehensive metabolic panel [[id number]]  (Abnormal) Collected: 05/24/22 1205    Lab Status: Final result Specimen: Blood from Arm, Left Updated: 05/24/22 1252     Sodium 136 mmol/L      Potassium 4 1 mmol/L      Chloride 104 mmol/L      CO2 22 mmol/L      ANION GAP 10 mmol/L      BUN 6 mg/dL      Creatinine 0 31 mg/dL      Glucose 61 mg/dL      Calcium 8 3 mg/dL      Corrected Calcium 9 4 mg/dL      AST 34 U/L      ALT 16 U/L      Alkaline Phosphatase 71 U/L      Total Protein 6 5 g/dL      Albumin 2 6 g/dL      Total Bilirubin 0 24 mg/dL      eGFR 160 ml/min/1 73sq m     Narrative:      [place] guidelines for Chronic Kidney Disease (CKD):     Stage 1 with normal or high GFR (GFR > 90 mL/min/1 73 square meters)    Stage 2 Mild CKD (GFR = 60-89 mL/min/1 73 square meters)    Stage 3A Moderate CKD (GFR = 45-59 mL/min/1 73 square meters)    Stage 3B Moderate CKD (GFR = 30-44 mL/min/1 73 square meters)    Stage 4 Severe CKD (GFR = 15-29 mL/min/1 73 square meters)    Stage 5 End Stage CKD (GFR <15 mL/min/1 73 square meters)  Note: GFR calculation is accurate only with a steady state creatinine    Protime-INR [[id number]]  (Normal) Collected: 05/24/22 1205    Lab Status: Final result Specimen: Blood from Arm, Left Updated: 05/24/22 1242     Protime 13 3 seconds      INR 1 03    APTT [[id number]]  (Normal) Collected: 05/24/22 1205    Lab Status: Final result Specimen: Blood from Arm, Left Updated: 05/24/22 1242     PTT 28 seconds     CBC and differential [[id number]]  (Abnormal) Collected: 05/24/22 1205    Lab Status: Final result Specimen: Blood from Arm, Left Updated: 05/24/22 1232     WBC 12 07 Thousand/uL      RBC 3 25 Million/uL      Hemoglobin 10 2 g/dL      Hematocrit 31 8 %      MCV 98 fL      MCH 31 4 pg      MCHC 32 1 g/dL      RDW 13 8 %      MPV 10 3 fL      Platelets 122 Thousands/uL     Narrative: This is an appended report  These results have been appended to a previously verified report  Urine culture [[id number]] Collected: 05/24/22 1215    Lab Status: In process Specimen: Urine, Clean Catch Updated: 05/24/22 1226    Urine Macroscopic, POC [[id number]] Collected: 05/24/22 1215    Lab Status: Final result Specimen: Urine Updated: 05/24/22 1217     Color, UA Yellow     Clarity, UA Slightly Cloudy     pH, UA 7 5     Leukocytes, UA Negative     Nitrite, UA Negative     Protein, UA Negative mg/dl      Glucose, UA Negative mg/dl      Ketones, UA Negative mg/dl      Urobilinogen, UA 0 2 E U /dl      Bilirubin, UA Negative     Blood, UA Negative     Specific Gravity, UA 1 020    Narrative:      CLINITEK RESULT                 CT head without contrast   Final Result by Hattie Desouza MD (05/24 1325)      No acute intracranial abnormality  Workstation performed: [id number]         MRA head wo contrast    (Results Pending)   MRV head wo contrast    (Results Pending)   MRA carotids wo contrast    (Results Pending)              Procedures  Procedures         ED Course  ED Course as of 05/25/22 1526   Tue May 24, 2022   1217 Discussed with neuro  Calling to discuss with radiology  1438 Dizziness better  Headache worse  Just returned from MRI  Informed her head CT was normal   I spoke with OBGYN and they were comfortable with giving her Solu-Medrol and magnesium for the headache and they will perform an NST                                               MDM    Disposition  Final diagnoses:   Headache     Time reflects when diagnosis was documented in both MDM as applicable and the Disposition within this note     Time User Action Codes Description Comment    5/24/2022  1:02 PM Laverne, [address] [facility] Terry Valderrama Soft corn     5/24/2022  1:02 PM Karolee Manjit J Modify [L84] Soft corn left foot    5/24/2022  1:02 PM Karolee Manjit Add [L03 116] Cellulitis of left foot     5/24/2022  1:03 PM Karolee Manjit Modify [L03 116] Cellulitis of left foot between 4th and 5th toe     5/24/2022  1:38 PM Karolee Manjit Modify [L03 116] Cellulitis of left foot between 4th and 5th toe     5/24/2022  1:38 PM Karolee Manjit Remove [L84] Soft corn left foot    5/24/2022  1:39 PM Karolee Manjit Remove [W04 601] Cellulitis of left foot between 4th and 5th toe     5/24/2022  2:03 PM Karolee Manjit Add [R51 9] Headache       ED Disposition     None      Follow-up Information     Follow up With Specialties Details Why Contact Info Additional Information    [facility] Schedule an appointment as soon as possible for a visit in 1 week  [address] [address] [phone]  [address] [place] [address] [address], [facility] [address], [place], [place], [address] [facility]          Current Discharge Medication List      CONTINUE these medications which have NOT CHANGED    Details   Docusate Sodium (COLACE PO) Take by mouth if needed      ferrous sulfate 324 (65 Fe) mg Take 1 tablet (324 mg total) by mouth every other day  Qty: 90 tablet, Refills: 1    Associated Diagnoses: 29 weeks gestation of pregnancy; Anemia during pregnancy in third trimester      meclizine (ANTIVERT) 25 mg tablet Take 1 tablet (25 mg total) by mouth as needed in the morning and 1 tablet (25 mg total) as needed at noon and 1 tablet (25 mg total) as needed in the evening for dizziness  Qty: 30 tablet, Refills: 0    Associated Diagnoses: 32 weeks gestation of pregnancy;  Dizziness      Prenatal Vit-Fe Fumarate-FA (PRENATAL VITAMIN PO)              No discharge procedures on file      PDMP Review     None          ED Provider  Electronically Signed by           Harrison Hu MD  05/25/22 0636

## 2022-05-24 NOTE — ASSESSMENT & PLAN NOTE
19 y o   female currently pregnant (32w 1d) who presented to [facility] on 2022 due to headache, dizziness, nausea, photophobia, and visual disturbances (seeing floaters and felt that she was looking through a kaleidoscope)  Upon arrival to the ED, /62  CT head negative for acute intracranial abnormality  MRV head negative for dural venous sinus thrombosis  MRA head/carotid unremarkable  Patient received Solu-Medrol 500 mg, Reglan 10 mg, Benadryl 25 mg, magnesium sulfate 2 g, and IV fluids  Patient states that the headache has improved  Plan:  - If patient would like to go home, she is cleared for discharge from a Neuro standpoint  Recommend discharging with PRN compazine   - If patient's headache worsens, can continue Solu-Medrol 500 mg daily, Reglan 10 mg Q8, Benadryl 25 mg Q8, magnesium sulfate 2 g daily, and IV fluids  - No further inpatient Neuro recommendations  Please call with questions or concerns

## 2022-05-24 NOTE — PROGRESS NOTES
[facility]        NAME: Lenard Reyes is a 19 y o  female  : 2000    MRN: [id number]  DATE: May 24, 2022  TIME: 10:10 AM    Assessment and Plan   Dizziness [R42]  1  Dizziness  ECG with rhythm strip   2  32 weeks gestation of pregnancy  ECG with rhythm strip   3  Anemia during pregnancy in third trimester  ECG with rhythm strip   4  Palpitations  ECG with rhythm strip     10:10 AM  EKG shows sinus rhythm at 80 beats per minute  Normal axis, normal intervals, no ST T wave abnormalities suggestive of ischemia  No old available for comparison  Fingerstick blood glucose is 79  Patient is 34 weeks pregnant reporting dizziness and tunnel vision  Neurologic exam is normal   She has been experiencing the symptoms off and on for the last few days  She was actually sent here for an EKG but due to her symptoms she requested to be seen  She is actually requesting  ambulance transfer to the hospital   She denies contractions, cramping, or back pain  Denies vaginal bleeding or leakage of fluid  EKG is unremarkable  Blood sugar is normal   Will call EMS  Patient Instructions       Go to the ED for evaluation of your dizziness  Chief Complaint     Chief Complaint   Patient presents with    Dizziness     Patient relates woke up this morning with dizziness and blurry vision  Patient is 34 weeks pregnant  Denies vaginal bleeding  C/O abdominal cramping which is usual for her  Denies chest pain and SOB  Patient was seen by her OB-GYN this morning and they ordered lab work and EKG  Patient had blood work done at the lab and was very dizzy  Patient brought over to urgent care to be seen  Provided with juice and crackers  History of Present Illness       Patient is 34 weeks pregnant reporting dizziness and "tunnel vision"  Neurologic exam is normal   She has been experiencing the symptoms off and on for the last few days    She was actually sent here for an EKG but due to her symptoms she requested to be seen  She is actually requesting  ambulance transfer to the hospital   She denies contractions, cramping, or back pain  Denies vaginal bleeding or leakage of fluid  Review of Systems   Review of Systems   Constitutional: Negative for chills, fatigue and fever  HENT: Negative for congestion, postnasal drip, sore throat and trouble swallowing  Eyes: Negative for visual disturbance  Respiratory: Negative for chest tightness and shortness of breath  Cardiovascular: Negative for leg swelling  Gastrointestinal: Negative for abdominal pain  Genitourinary: Negative for dysuria, flank pain, pelvic pain, vaginal bleeding, vaginal discharge and vaginal pain  Musculoskeletal: Negative for back pain  Skin: Negative for rash  Allergic/Immunologic: Negative for immunocompromised state  Neurological: Positive for dizziness  Negative for light-headedness  Psychiatric/Behavioral: Negative for confusion  Current Medications       Current Outpatient Medications:     Docusate Sodium (COLACE PO), Take by mouth if needed, Disp: , Rfl:     ferrous sulfate 324 (65 Fe) mg, Take 1 tablet (324 mg total) by mouth every other day, Disp: 90 tablet, Rfl: 1    meclizine (ANTIVERT) 25 mg tablet, Take 1 tablet (25 mg total) by mouth as needed in the morning and 1 tablet (25 mg total) as needed at noon and 1 tablet (25 mg total) as needed in the evening for dizziness  , Disp: 30 tablet, Rfl: 0    Prenatal Vit-Fe Fumarate-FA (PRENATAL VITAMIN PO), , Disp: , Rfl:     Current Allergies     Allergies as of 05/24/2022 - Reviewed 05/24/2022   Allergen Reaction Noted    Benadryl [diphenhydramine] Hives 04/19/2016            The following portions of the patient's history were reviewed and updated as appropriate: allergies, current medications, past family history, past medical history, past social history, past surgical history and problem list      Past Medical History:   Diagnosis Date    Depression 2011    History of transfusion     2014    Varicella     vaccine       Past Surgical History:   Procedure Laterality Date    SKIN GRAFT Left     left elbow    SKIN GRAFT      left elbow       Family History   Problem Relation Age of Onset    Hypothyroidism Mother     Bipolar disorder Mother     Mental illness Mother     Thyroid disease Mother         Hypothyroidism    Schizophrenia Mother     No Known Problems Father     Cancer Maternal Grandmother         Breast cancer    Heart disease Maternal Grandmother     Breast cancer Maternal Grandmother     Lung cancer Maternal Grandfather     Cancer Maternal Grandfather         Lung cancer    Emphysema Maternal Grandfather     Asperger's syndrome Brother     Addiction problem Brother     No Known Problems Son     Varicose Veins Paternal Grandmother     Diabetes Paternal Grandfather     Depression Half-Sister     No Known Problems Brother     Colon cancer Neg Hx     Ovarian cancer Neg Hx          Medications have been verified  Objective   /62   Pulse 88   Temp 98 3 °F (36 8 °C) (Tympanic)   Resp 16   Ht 5' 6" (1 676 m)   Wt 83 5 kg (184 lb)   LMP 10/11/2021   SpO2 100%   BMI 29 70 kg/m²        Physical Exam     Physical Exam  Vitals and nursing note reviewed  Constitutional:       Appearance: Normal appearance  She is not ill-appearing  HENT:      Head: Normocephalic and atraumatic  Right Ear: Tympanic membrane, ear canal and external ear normal       Left Ear: Tympanic membrane, ear canal and external ear normal       Nose: Nose normal       Mouth/Throat:      Mouth: Mucous membranes are moist    Eyes:      Extraocular Movements: Extraocular movements intact  Pupils: Pupils are equal, round, and reactive to light  Cardiovascular:      Rate and Rhythm: Normal rate and regular rhythm  Pulses: Normal pulses     Pulmonary:      Effort: Pulmonary effort is normal       Breath sounds: Normal breath sounds  Abdominal:      Comments: Gravid uterus palpable between the umbilicus and the xiphoid process, consistent with dates  Fetal movement noted  Musculoskeletal:      Cervical back: Normal range of motion  Skin:     General: Skin is warm and dry  Capillary Refill: Capillary refill takes less than 2 seconds  Coloration: Skin is pale  Neurological:      General: No focal deficit present  Mental Status: She is alert and oriented to person, place, and time     Psychiatric:         Mood and Affect: Mood normal          Behavior: Behavior normal

## 2022-05-24 NOTE — CONSULTS
Consultation - Neurology   Vanessa Garza 19 y o  female MRN: [id number]  Unit/Bed#: ED 18 Encounter: [id number]      Assessment/Plan     Migraine headache  Assessment & Plan  19 y o  [id number] female currently pregnant (32w 1d) who presented to [facility] [place] [place] on 5/24/2022 due to headache, dizziness, nausea, photophobia, and visual disturbances (seeing floaters and felt that she was looking through a kaleidoscope)  Upon arrival to the ED, /62  CT head negative for acute intracranial abnormality  MRV head negative for dural venous sinus thrombosis  MRA head/carotid unremarkable  Patient received Solu-Medrol 500 mg, Reglan 10 mg, Benadryl 25 mg, magnesium sulfate 2 g, and IV fluids  Patient states that the headache has improved  Plan:  - If patient would like to go home, she is cleared for discharge from a Neuro standpoint  Recommend discharging with PRN compazine   - If patient's headache worsens, can continue Solu-Medrol 500 mg daily, Reglan 10 mg Q8, Benadryl 25 mg Q8, magnesium sulfate 2 g daily, and IV fluids  - No further inpatient Neuro recommendations  Please call with questions or concerns  Plan of care discussed with attending Neurologist       Vanessa Garza will need follow up in 6 weeks with headache attending or advance practitioner if patient continues to have headaches  She will not require outpatient neurological testing  History of Present Illness     Reason for Consult / Principal Problem: migraine headache  Hx and PE limited by: N/A  HPI: Vanessa Garza is a 19 y o  [id number] female currently pregnant (32w 1d) who presented to [facility] [place] [place] on 5/24/2022 due to headache, neck pain, dizziness, and visual disturbances  Patient states that yesterday she was having episodes of dizziness and tachycardia  This returned around 6:30 AM today  Around 9:30 AM patient began having a frontal headache rated as a 7/10 pain    She was having difficulty concentrating and could not see straight " She had floaters and it felt like she was looking through a kaleidoscope  She also had nausea and photophobia  She initially presented to her OB who recommended EKG at urgent care for the tachycardia  While in urgent care, it was recommended that she come to the emergency room due to the severe migraine headache  Upon arrival to the ED, /62  CT head negative for acute intracranial abnormality  MRV head negative for dural venous sinus thrombosis  MRA head/carotid unremarkable  Patient received Solu-Medrol 500 mg, Reglan 10 mg, Benadryl 25 mg, magnesium sulfate 2 g, and IV fluids  Patient states that the headache has improved to a 4/10 pain  The nausea has resolved in the visual disturbances have also improved  Denies any weakness or numbness  She does not have any history of migraine headaches  Consult to neurology  Consult performed by: Kevin Guillermo PA-C  Consult ordered by: Savana Ignacio MD          Review of Systems   Constitutional: Negative for fatigue and fever  HENT: Negative for trouble swallowing and voice change  Eyes: Positive for photophobia and visual disturbance  Respiratory: Negative for chest tightness and shortness of breath  Cardiovascular: Negative for chest pain and palpitations  Gastrointestinal: Negative for abdominal pain, constipation, diarrhea, nausea and vomiting  Genitourinary: Negative for difficulty urinating and dysuria  Musculoskeletal: Negative for back pain and gait problem  Neurological: Positive for headaches  Negative for dizziness, tremors, facial asymmetry, speech difficulty, weakness and numbness  Psychiatric/Behavioral: Negative for confusion and decreased concentration         Historical Information   Past Medical History:   Diagnosis Date    Depression 2011    History of transfusion     2014    Varicella     vaccine     Past Surgical History:   Procedure Laterality Date    SKIN GRAFT Left     left elbow    SKIN GRAFT      left elbow     Social History   Social History     Substance and Sexual Activity   Alcohol Use Not Currently    Alcohol/week: 2 0 standard drinks    Types: 2 Glasses of wine per week     Social History     Substance and Sexual Activity   Drug Use No     E-Cigarette/Vaping    E-Cigarette Use Never User      E-Cigarette/Vaping Substances    Nicotine No     THC No     CBD No     Flavoring No     Other No     Unknown No      Social History     Tobacco Use   Smoking Status Never Smoker   Smokeless Tobacco Never Used     Family History:   Family History   Problem Relation Age of Onset    Hypothyroidism Mother     Bipolar disorder Mother     Mental illness Mother     Thyroid disease Mother         Hypothyroidism    Schizophrenia Mother     No Known Problems Father     Cancer Maternal Grandmother         Breast cancer    Heart disease Maternal Grandmother     Breast cancer Maternal Grandmother     Lung cancer Maternal Grandfather     Cancer Maternal Grandfather         Lung cancer    Emphysema Maternal Grandfather     Asperger's syndrome Brother     Addiction problem Brother     No Known Problems Son     Varicose Veins Paternal Grandmother     Diabetes Paternal Grandfather     Depression Half-Sister     No Known Problems Brother     Colon cancer Neg Hx     Ovarian cancer Neg Hx        Review of previous medical records was completed  Reviewed prior notes, labs, MRV head, MRA head/carotids    Meds/Allergies   all current active meds have been reviewed, current meds:   Current Facility-Administered Medications   Medication Dose Route Frequency    methylPREDNISolone sodium succinate (Solu-MEDROL) 500 mg in sodium chloride 0 9 % 250 mL IVPB  500 mg Intravenous Once    and PTA meds:   Prior to Admission Medications   Prescriptions Last Dose Informant Patient Reported? Taking?    Docusate Sodium (COLACE PO)   Yes Yes   Sig: Take by mouth if needed   Prenatal Vit-Fe Fumarate-FA (PRENATAL VITAMIN PO)  Self Yes Yes   ferrous sulfate 324 (65 Fe) mg   No Yes   Sig: Take 1 tablet (324 mg total) by mouth every other day   meclizine (ANTIVERT) 25 mg tablet   No Yes   Sig: Take 1 tablet (25 mg total) by mouth as needed in the morning and 1 tablet (25 mg total) as needed at noon and 1 tablet (25 mg total) as needed in the evening for dizziness  Facility-Administered Medications: None       Allergies   Allergen Reactions    Benadryl [Diphenhydramine] Hives       Objective   Vitals:Blood pressure 137/70, pulse 97, temperature 97 6 °F (36 4 °C), temperature source Oral, resp  rate 18, height 5' 6" (1 676 m), last menstrual period 10/11/2021, SpO2 100 %  ,Body mass index is 29 7 kg/m²  No intake or output data in the 24 hours ending 05/24/22 1435    Invasive Devices: Invasive Devices  Report    Peripheral Intravenous Line  Duration           Peripheral IV 05/24/22 Left Forearm <1 day                Physical Exam  Vitals and nursing note reviewed  Constitutional:       Appearance: Normal appearance  Comments: Pleasant female lying comfortably in bed in no acute distress  Patient is lying in a dark room  HENT:      Head: Normocephalic and atraumatic  Nose: Nose normal       Mouth/Throat:      Mouth: Mucous membranes are moist       Pharynx: Oropharynx is clear  Eyes:      Extraocular Movements: Extraocular movements intact  Conjunctiva/sclera: Conjunctivae normal       Pupils: Pupils are equal, round, and reactive to light  Pulmonary:      Effort: Pulmonary effort is normal    Musculoskeletal:         General: Normal range of motion  Skin:     General: Skin is warm and dry  Neurological:      General: No focal deficit present  Mental Status: She is alert and oriented to person, place, and time  Neurologic Exam     Mental Status   Oriented to person, place, and time  Patient awake and alert  Oriented to person, place, month, and year    No dysarthria or aphasia  Able to follow simple commands  Cranial Nerves     CN III, IV, VI   Pupils are equal, round, and reactive to light  Pupils round and reactive to light bilaterally  EOMs intact without nystagmus  No visual field deficits  Facial sensation to light touch intact throughout  Facial expressions full and symmetric  Tongue midline  Motor Exam   Muscle bulk: normal  Overall muscle tone: normal  5/5 strength in bilateral upper and lower extremities  Sensory Exam   Sensation to light touch intact throughout  Gait, Coordination, and Reflexes     Gait  Gait: (Deferred for patient's safety)  No ataxia finger-to-nose bilaterally  No resting or action tremor  2+ reflexes throughout  Lab Results:   I have personally reviewed pertinent reports  , CBC:   Results from last 7 days   Lab Units 05/24/22  1205   WBC Thousand/uL 12 07*   RBC Million/uL 3 25*   HEMOGLOBIN g/dL 10 2*   HEMATOCRIT % 31 8*   MCV fL 98   PLATELETS Thousands/uL 213   , BMP/CMP:   Results from last 7 days   Lab Units 05/24/22  1205   SODIUM mmol/L 136   POTASSIUM mmol/L 4 1   CHLORIDE mmol/L 104   CO2 mmol/L 22   BUN mg/dL 6   CREATININE mg/dL 0 31*   CALCIUM mg/dL 8 3   AST U/L 34   ALT U/L 16   ALK PHOS U/L 71   EGFR ml/min/1 73sq m 160   , Vitamin B12:   , HgBA1C:   , TSH:   , Coagulation:   Results from last 7 days   Lab Units 05/24/22  1205   INR  1 03   , Lipid Profile:   , Ammonia:   , Urinalysis:   Results from last 7 days   Lab Units 05/24/22  1215   COLOR UA  Yellow   CLARITY UA  Slightly Cloudy   SPEC GRAV UA  1 020   PH UA  7 5   LEUKOCYTES UA  Negative   NITRITE UA  Negative   GLUCOSE UA mg/dl Negative   KETONES UA mg/dl Negative   BILIRUBIN UA  Negative   BLOOD UA  Negative   , Drug Screen:   , Medication Drug Levels:       Invalid input(s): CARBAMAZEPINE,  PHENOBARB, LACOSAMIDE, OXCARBAZEPINE  Imaging Studies: I have personally reviewed pertinent reports     and I have personally reviewed pertinent films in PACS  EKG, Pathology, and Other Studies: I have personally reviewed pertinent reports     and I have personally reviewed pertinent films in PACS  VTE Prophylaxis: Sequential compression device Chuck Sanchez)     Code Status: No Order  Advance Directive and Living Will:      Power of :    POLST:

## 2022-05-25 ENCOUNTER — ULTRASOUND (OUTPATIENT)
Dept: PERINATAL CARE | Facility: OTHER | Age: 22
End: 2022-05-25
Payer: COMMERCIAL

## 2022-05-25 ENCOUNTER — SOCIAL WORK (OUTPATIENT)
Dept: BEHAVIORAL/MENTAL HEALTH CLINIC | Facility: CLINIC | Age: 22
End: 2022-05-25
Payer: COMMERCIAL

## 2022-05-25 VITALS
HEART RATE: 130 BPM | HEIGHT: 66 IN | WEIGHT: 188.2 LBS | DIASTOLIC BLOOD PRESSURE: 74 MMHG | SYSTOLIC BLOOD PRESSURE: 117 MMHG | BODY MASS INDEX: 30.25 KG/M2

## 2022-05-25 DIAGNOSIS — F41.9 ANXIETY: ICD-10-CM

## 2022-05-25 DIAGNOSIS — Z3A.32 32 WEEKS GESTATION OF PREGNANCY: ICD-10-CM

## 2022-05-25 DIAGNOSIS — U07.1 COVID-19 AFFECTING PREGNANCY IN FIRST TRIMESTER: ICD-10-CM

## 2022-05-25 DIAGNOSIS — F32.A DEPRESSIVE DISORDER: Primary | ICD-10-CM

## 2022-05-25 DIAGNOSIS — O09.293 PRIOR FETAL MACROSOMIA IN THIRD TRIMESTER, ANTEPARTUM: Primary | ICD-10-CM

## 2022-05-25 DIAGNOSIS — O98.511 COVID-19 AFFECTING PREGNANCY IN FIRST TRIMESTER: ICD-10-CM

## 2022-05-25 DIAGNOSIS — Z03.72 PLACENTAL PROBLEM SUSPECTED BUT NOT FOUND: ICD-10-CM

## 2022-05-25 LAB — BACTERIA UR CULT: NORMAL

## 2022-05-25 PROCEDURE — 76816 OB US FOLLOW-UP PER FETUS: CPT | Performed by: OBSTETRICS & GYNECOLOGY

## 2022-05-25 PROCEDURE — 76817 TRANSVAGINAL US OBSTETRIC: CPT | Performed by: OBSTETRICS & GYNECOLOGY

## 2022-05-25 PROCEDURE — 99213 OFFICE O/P EST LOW 20 MIN: CPT | Performed by: OBSTETRICS & GYNECOLOGY

## 2022-05-25 PROCEDURE — 90791 PSYCH DIAGNOSTIC EVALUATION: CPT | Performed by: SOCIAL WORKER

## 2022-05-25 NOTE — PROGRESS NOTES
Lenard Reyes has no complaints today  She reports regular fetal movements and does not report any problems  She is here today at [place] [address] for an ultrasound for fetal growth and placental location  She had a normal Glucola screen    Problem list:  1  History of macrosomic baby weighing 9 pound with a 1hr second stage  2  History of COVID in pregnancy  3  History of depression  4  History of a low-lying placenta    Ultrasound findings: The ultrasound today shows normal interval fetal growth and fluid  Overall the fetal weight is in the 71st percentile with a fetal abdomen in the 93rd percentile  Placenta is no longer low-lying  Pregnancy ultrasound has limitations and is unable to detect all forms of fetal congenital abnormalities  The inaccuracy in the EFW can be off by 1 lb either way in the third trimester  Follow up recommended:   1  Recommend a follow-up ultrasound at 37 weeks  Pre visit time reviewing her records   5 minutes  Face to face time 5 minutes  Post visit time on documentation of note, updating her problem list, adding orders and prescriptions 5 minutes  Procedures that were completed today were charged separately  The level of decision making was straight forward      Severiano Hughs, MD none

## 2022-05-25 NOTE — PROGRESS NOTES
Ultrasound Probe Disinfection    A transvaginal ultrasound was performed  Prior to use, disinfection was performed with High Level Disinfection Process (Trophon)  Probe serial number S3: [id number] was used        Shruthi House RDMS  05/25/22  10:18 AM

## 2022-05-25 NOTE — LETTER
May 25, 2022     [place], [address]    Patient: Lenard Reyes   YOB: 2000   Date of Visit: 5/25/2022       Dear Dr Deborah Kwok: Thank you for referring María Beal to me for evaluation  Below are my notes for this consultation  If you have questions, please do not hesitate to call me  I look forward to following your patient along with you  Sincerely,        Severiano Hughs, MD        CC: No Recipients  Severiano Hughs, MD  5/25/2022 11:24 AM  Sign when Signing Visit  Lenard Reyes has no complaints today  She reports regular fetal movements and does not report any problems  She is here today at [place] [address] for an ultrasound for fetal growth and placental location  She had a normal Glucola screen    Problem list:  1  History of macrosomic baby weighing 9 pound with a 1hr second stage  2  History of COVID in pregnancy  3  History of depression  4  History of a low-lying placenta    Ultrasound findings: The ultrasound today shows normal interval fetal growth and fluid  Overall the fetal weight is in the 71st percentile with a fetal abdomen in the 93rd percentile  Placenta is no longer low-lying  Pregnancy ultrasound has limitations and is unable to detect all forms of fetal congenital abnormalities  The inaccuracy in the EFW can be off by 1 lb either way in the third trimester  Follow up recommended:   1  Recommend a follow-up ultrasound at 37 weeks  Pre visit time reviewing her records   5 minutes  Face to face time 5 minutes  Post visit time on documentation of note, updating her problem list, adding orders and prescriptions 5 minutes  Procedures that were completed today were charged separately  The level of decision making was straight forward      Severiano Hughs, MD

## 2022-05-26 NOTE — PSYCH
Assessment/Plan:      Diagnoses and all orders for this visit:    Depressive disorder    Anxiety          Subjective:      Patient ID: Reyes Pare is a 19 y o  female  HPI:     Pre-morbid level of function and History of Present Illness: Long hx of anxiety & depression; recently exacerbated since January 2022  Previous Psychiatric/psychological treatment/year: NA  Current Psychiatrist/Therapist: [facility]  Outpatient and/or Partial and Other [employer] Used (CTT, ICM, VNA): [facility]      Problem Assessment:     SOCIAL/VOCATION:  Family Constellation (include parents, relationship with each and pertinent Psych/Medical History):     Family History   Problem Relation Age of Onset    Hypothyroidism Mother     Bipolar disorder Mother     Mental illness Mother     Thyroid disease Mother         Hypothyroidism    Schizophrenia Mother     No Known Problems Father     Cancer Maternal Grandmother         Breast cancer    Heart disease Maternal Grandmother     Breast cancer Maternal Grandmother     Lung cancer Maternal Grandfather     Cancer Maternal Grandfather         Lung cancer    Emphysema Maternal Grandfather     Asperger's syndrome Brother     Addiction problem Brother     No Known Problems Son     Varicose Veins Paternal Grandmother     Diabetes Paternal Grandfather     Depression Half-Sister     No Known Problems Brother     Colon cancer Neg Hx     Ovarian cancer Neg Hx        Mother: Drugs; Bipolar  Spouse: NA   Father: Drugs   Children: NA   Sibling: Brother - drugs   Sibling: NA   Children: NA   Other: [facility]    Annika relates best to her   she lives with her , son and step-MIL  she does not live alone  Domestic Violence: No past history of domestic violence    Additional Comments related to family/relationships/peer support: Pt's  is her biggest support       School or Work History (strengths/limitations/needs): Pt is employed FT as a Medical Assistant    Her highest grade level achieved was Associates Degree     history includes NA    Financial status includes  NA    LEISURE ASSESSMENT (Include past and present hobbies/interests and level of involvement (Ex: Group/Club Affiliations): Outside; exercise  her primary language is [place]  Preferred language is [place]  Ethnic considerations are NA  Religions affiliations and level of involvement Active   Does spirituality help you cope? Yes     FUNCTIONAL STATUS: There has been a recent change in [facility] ability to do the following: does not need can service    Level of Assistance Needed/By Whom?: [facility]    Annika learns best by  demonstration    SUBSTANCE ABUSE ASSESSMENT: no substance abuse    Substance/Route/Age/Amount/Frequency/Last Use: NA    DETOX HISTORY: NA    Previous detox/rehab treatment: NA    HEALTH ASSESSMENT: no referral to PCP needed    LEGAL: No Mental Health Advance Directive or Power of  on file    Prenatal History: N/A    Delivery History: N/A    Developmental Milestones: N/A  Temperament as an infant was N/A  Temperament as a toddler was N/A  Temperament at school age was N/A  Temperament as a teenager was N/A  Risk Assessment:   The following ratings are based on my observation of this patient over the last session    Risk of Harm to Self:   Demographic risk factors include   Historical Risk Factors include self-mutilating behaviors  Recent Specific Risk Factors include diagnosis of depression   Additional Factors for a Child or Adolescent gender: female (more likely to attempt)    Risk of Harm to Others:   Demographic Risk Factors include NA  Historical Risk Factors include NA  Recent Specific Risk Factors include NA    Access to Weapons:   [facility] has access to the following weapons: None   The following steps have been taken to ensure weapons are properly secured: NA    Based on the above information, the client presents the following risk of harm to self or others:  low    The following interventions are recommended:   no intervention changes    Notes regarding this Risk Assessment: NA        Review Of Systems:     Mood Anxiety and Depression   Behavior Impulsive Behavior   Thought Content Normal   General Normal    Personality Normal   Other Psych Symptoms Normal   Constitutional Normal   ENT Normal   Cardiovascular Normal    Respiratory Normal    Gastrointestinal Normal   Genitourinary Normal    Musculoskeletal Negative   Integumentary Normal    Neurological Normal    Endocrine Normal          Mental status:  Appearance calm and cooperative    Mood depressed   Affect affect appropriate    Speech a normal rate   Thought Processes normal thought processes   Hallucinations no hallucinations present    Thought Content no delusions   Abnormal Thoughts no suicidal thoughts  and no homicidal thoughts    Orientation  oriented to person and place and time   Remote Memory short term memory intact and long term memory intact   Attention Span concentration intact   Intellect Appears to be of Average Intelligence   Fund of Knowledge displays adequate knowledge of current events   Insight Insight intact   Judgement judgment was intact   Muscle Strength Muscle strength and tone were normal   Language no difficulty naming common objects   Pain none   Pain Scale 0   Assessment/Plan:      Diagnoses and all orders for this visit:    Depressive disorder    Anxiety          Subjective:  Pt presented for initial therapy session  Met with pt from [address] [place] [address]  Pt provided all necessary background information  Pt grew up in the [place] area with her mother and twin brother  Pt's father was not involved as she was growing up  Pt tends to avoid her family when they are actively "toxic "  Pt explained that her mother has been an addict (Meth) for most of pt's life  Her mother also suffers from Bipolar disorder and drug-induced psychosis from time to time  Her father is a long-time Meth user as well    Pt's brother had been a productive member of society until last year when he too got hooked on Meth  Pt reported that he claims to enjoy his life on Meth and has no intentions on getting clean  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 [employer]) M-F 8-4:30p  She is  to her , Mecca [place] (Together since  and they were  last )  He works FT as a   Together they have 2yo Mecca Gum Anh Casey)  Pt is currently 32w2d pregnant with an LUANNE of 22  Pt and her family are currently residing with her 's stepmother  They have settlement on a home on   Pt reported they are all cooped up in a small room together and has told her  she will NOT stay in the home once the baby is born  The plan is to move in with her 's biological mother until they are ready to move into their new home  Pt's pregnancy was planned and she intends to breast feed  Pt reported no complications with her pregnancy and that things were going well until January when her Father-in-law   Pt had 2 other familial losses after this as well  Since then, things have become very stressful in the home with her 's stepmother  Pt has been struggling with headaches and "psychogenic fevers" related to her stress  Pt enjoys spending time outside and exercising  She distracts herself from her stressors by going for walks or cleaning  Pt reported that she and her  have been arguing more lately due to the increase in stress at home  Pt admits she tends to act child-like when she is arguing with her  and she will "threaten" to harm herself  She denies any active plan  Her sleep is "okay" and she has had an increased appetite recently  Processed pt's feelings at length and discussed coping skills  Patient ID: Lenard Reyes is a 19 y o  female  HPI    Review of Systems  : Pt was pleasant and cooperative       Objective:     Physical Exam

## 2022-05-31 ENCOUNTER — TELEPHONE (OUTPATIENT)
Dept: NEUROLOGY | Facility: CLINIC | Age: 22
End: 2022-05-31

## 2022-05-31 NOTE — TELEPHONE ENCOUNTER
1ST ATTEMPT - to schedule HFU after seen in the ED on 5/24 consulted by neurology     [facility] ED 5/24 for dizziness/headache - [facility] Felice Loya will need follow up in 6 weeks with headache attending or advance practitioner if patient continues to have headaches   She will not require outpatient neurological testing

## 2022-06-06 ENCOUNTER — ROUTINE PRENATAL (OUTPATIENT)
Dept: OBGYN CLINIC | Facility: MEDICAL CENTER | Age: 22
End: 2022-06-06

## 2022-06-06 VITALS — SYSTOLIC BLOOD PRESSURE: 116 MMHG | WEIGHT: 191 LBS | DIASTOLIC BLOOD PRESSURE: 72 MMHG | BODY MASS INDEX: 30.83 KG/M2

## 2022-06-06 DIAGNOSIS — Z34.93 THIRD TRIMESTER PREGNANCY: Primary | ICD-10-CM

## 2022-06-06 DIAGNOSIS — R06.00 DYSPNEA ON EXERTION: ICD-10-CM

## 2022-06-06 DIAGNOSIS — O09.293 PRIOR FETAL MACROSOMIA IN THIRD TRIMESTER, ANTEPARTUM: ICD-10-CM

## 2022-06-06 DIAGNOSIS — Z3A.34 34 WEEKS GESTATION OF PREGNANCY: ICD-10-CM

## 2022-06-06 DIAGNOSIS — R07.9 CHEST PAIN, UNSPECIFIED TYPE: ICD-10-CM

## 2022-06-06 DIAGNOSIS — O99.013 ANEMIA DURING PREGNANCY IN THIRD TRIMESTER: ICD-10-CM

## 2022-06-06 PROCEDURE — PNV: Performed by: OBSTETRICS & GYNECOLOGY

## 2022-06-06 NOTE — PROGRESS NOTES
Assessment  19 y o  [id number] at 34w0d presenting for routine prenatal visit  Plan  Diagnoses and all orders for this visit:    Third trimester pregnancy  34 weeks gestation of pregnancy  -  labor precautions  - FKC  - GBS next visit  - Return in 2wks for PN    Dyspnea on exertion  Chest pain, unspecified type  -     Ambulatory Referral to Cardiology; Future  -     Echo complete w/ contrast if indicated; Future    Prior fetal macrosomia in third trimester, antepartum  - Follows with MFM for growth monitoring    Anemia during pregnancy in third trimester  - Mild and stable; psb contributing to symptoms      ____________________________________________________________        Subjective    Gustavo Nieto is a 19 y o  [id number] at 34w0d who presents for routine prenatal visit  She is reporting progressively worsening dyspnea and chest pain  Reports has had dyspnea with exertion her whole pregnancy, but now struggles with minimal activity or prolonged talking  Has hx palpitation/tachycardia  Underwent echo/holter in 2019 without concerns  She denies contractions, loss of fluid, or vaginal bleeding  She feels regular fetal movements  Pregnancy Problems:  Patient Active Problem List   Diagnosis    COVID-19 affecting pregnancy in first trimester    Bipolar depression ([facility] [address] )    Depressive disorder    Insomnia    32 weeks gestation of pregnancy    Depression during pregnancy in third trimester    Anemia during pregnancy in third trimester    Migraine headache    Prior fetal macrosomia in third trimester, antepartum         Objective  /72   Wt 86 6 kg (191 lb)   LMP 10/11/2021   BMI 30 83 kg/m²     FHT: 135 BPM   Uterine Size: size equals dates     Physical Exam:  Physical Exam  Constitutional:       Appearance: Normal appearance  She is well-developed  She is not ill-appearing, toxic-appearing or diaphoretic  HENT:      Head: Normocephalic and atraumatic  Eyes:      General: No scleral icterus  Right eye: No discharge  Left eye: No discharge  Conjunctiva/sclera: Conjunctivae normal    Cardiovascular:      Heart sounds: Normal heart sounds  No murmur heard  No friction rub  No gallop  Pulmonary:      Effort: Respiratory distress (ok at rest, when speaking she pauses to catch breath) present  No accessory muscle usage  Breath sounds: Normal breath sounds  No wheezing or rales  Abdominal:      General: There is distension (gravid)  Tenderness: There is no abdominal tenderness  There is no guarding or rebound  Skin:     General: Skin is warm and dry  Coloration: Skin is not jaundiced  Findings: No bruising, erythema or rash  Neurological:      Mental Status: She is alert  Psychiatric:         Mood and Affect: Mood normal          Behavior: Behavior normal          Thought Content:  Thought content normal          Judgment: Judgment normal

## 2022-06-06 NOTE — TELEPHONE ENCOUNTER
Patient called back and scheduled a hospital follow up appointment scheduled: 8/16/22 at 10:15 am with Renetta Chavez in [facility]   Added patient to the waitlist  Neuro

## 2022-06-07 ENCOUNTER — OFFICE VISIT (OUTPATIENT)
Dept: CARDIOLOGY CLINIC | Facility: CLINIC | Age: 22
End: 2022-06-07
Payer: COMMERCIAL

## 2022-06-07 VITALS — WEIGHT: 191 LBS | BODY MASS INDEX: 30.83 KG/M2

## 2022-06-07 DIAGNOSIS — R07.9 CHEST PAIN, UNSPECIFIED TYPE: ICD-10-CM

## 2022-06-07 DIAGNOSIS — R00.2 PALPITATIONS: ICD-10-CM

## 2022-06-07 DIAGNOSIS — R06.00 DYSPNEA ON EXERTION: Primary | ICD-10-CM

## 2022-06-07 PROCEDURE — 99244 OFF/OP CNSLTJ NEW/EST MOD 40: CPT

## 2022-06-07 PROCEDURE — 93000 ELECTROCARDIOGRAM COMPLETE: CPT

## 2022-06-07 NOTE — PROGRESS NOTES
Cardiology   MD Yobany Murray MD Florence Gang, DO, [facility], MD Danyell Ochoa DO, Alonzo Dozier DO, [facility]  -------------------------------------------------------------------  [facility]  [address], [place] [address]  [phone]  [phone]  06/07/22  Fran Erwin  YOB: 2000   MRN: [id number]      Referring Physician: Maryann Reese MD  [address] [place] [address]     HPI: Fran Erwin is a 19 y o  female with:   No significant prior past medical history  Currently pregnant at 8 5 months    She presents today for initial cardiac evaluation  She states that over the past month or so she has been having increased dyspnea with exertion as well as chest pain with exertion  She states that she also notes palpitations and increased heart rate which she has had since she was a teenager and was exacerbated during her 1st pregnancy as well  The dyspnea with exertion chest pain with exertion are new since her 1st pregnancy  She spoke to her OB Gyne yesterday ordered echocardiogram   She presents for cardiac evaluation today for further evaluation  Her ECG today shows sinus tachycardia with possible left atrial abnormality, but is otherwise normal   Her recent labs do indicate mild level of anemia  She does not smoke, does not drink, does not use drugs    Review of Systems   Constitutional: Negative for chills and fever  HENT: Negative for facial swelling and sore throat  Eyes: Negative for visual disturbance  Respiratory: Positive for shortness of breath  Negative for cough, chest tightness and wheezing  Cardiovascular: Positive for chest pain and palpitations  Negative for leg swelling  Gastrointestinal: Negative for abdominal pain, blood in stool, constipation, diarrhea, nausea and vomiting  Endocrine: Negative for cold intolerance and heat intolerance  Genitourinary: Negative for decreased urine volume, difficulty urinating, dysuria and hematuria  Musculoskeletal: Negative for arthralgias, back pain and myalgias  Skin: Negative for rash  Neurological: Negative for dizziness, syncope, weakness and numbness  Psychiatric/Behavioral: Negative for agitation, behavioral problems and confusion  The patient is not nervous/anxious  OBJECTIVE  There were no vitals filed for this visit  Physical Exam  Constitutional: awake, alert and oriented, in no acute distress, no obvious deformities  Head: Normocephalic, without obvious abnormality, atraumatic  Eyes: conjunctivae clear and moist  Sclera anicteric  No xanthelasmas  Pupils equal bilaterally  Extraocular motions are full  Ear nose mouth and throat: ears are symmetrical bilaterally, hearing appears to be equal bilaterally, no nasal discharge or epistaxis, oropharynx is clear with moist mucous membranes  Neck:  Trachea is midline, neck is supple, no thyromegaly or significant lymphadenopathy, there is full range of motion  Lungs: clear to auscultation bilaterally, no wheezes, no rales, no rhonchi, no accessory muscle use, breathing is nonlabored  Heart: regular rate and rhythm, S1, S2 normal, no murmur, no click, no rub and no gallop, no lower extremity edema  Abdomen:  Gravid uterus, soft, non-tender; bowel sounds normal; no masses,  no organomegaly  Psychiatric:  Patient is oriented to time, place, person, mood/affect is negative for depression, anxiety, agitation, appears to have appropriate insight  Skin: Skin is warm, dry, intact  No obvious rashes or lesions on exposed extremities  Nail beds are pink with no cyanosis or clubbing      EKG:  Results for orders placed or performed in visit on 06/07/22   POCT ECG    Impression    Sinus tachycardia with possible left atrial abnormality, otherwise normal        IMPRESSION:  Atypical chest pain   Dyspnea on exertion   Pregnant at a 8 5 months gestation   Sinus tachycardia  Palpitations    DISCUSSION/RECOMMENDATIONS:  She presents today for initial evaluation with Cardiology for atypical chest pain and dyspnea on exertion  She is mildly anemic and 8 and half months pregnant  She does have a mild sinus tachycardia which is explained by her pregnancy, as well as mild anemia  I feel that her symptoms of dyspnea with exertion are also able to be explain in the same way  Likely incomplete diaphragm expansion given her late stage of pregnancy on top of mild anemia  Echocardiogram has been ordered by her OBGYN for further evaluation, which I feel is reasonable  She does have a mild sinus tachycardia today with a inverted P-wave in V1 which is suggestive of left atrial abnormality  Will evaluate overall LV function and valvular function  Rolanda Najera DO, [facility] [place]  --------------------------------------------------------------------------------  TREADMILL STRESS  No results found for this or any previous visit      ----------------------------------------------------------------------------------------------  NUCLEAR STRESS TEST: No results found for this or any previous visit  No results found for this or any previous visit       --------------------------------------------------------------------------------  CATH:  No results found for this or any previous visit     --------------------------------------------------------------------------------  ECHO:   No results found for this or any previous visit  No results found for this or any previous visit     --------------------------------------------------------------------------------  HOLTER  No results found for this or any previous visit  No results found for this or any previous visit     --------------------------------------------------------------------------------  CAROTIDS  No results found for this or any previous visit  --------------------------------------------------------------------------------  Diagnoses and all orders for this visit:    Dyspnea on exertion  -     Ambulatory Referral to Cardiology  -     POCT ECG    Chest pain, unspecified type  -     Ambulatory Referral to Cardiology  -     POCT ECG    Palpitations     ======================================================    Past Medical History:   Diagnosis Date    Depression 2011    History of transfusion     2014    Migraine headache 5/24/2022    Varicella     vaccine     Past Surgical History:   Procedure Laterality Date    SKIN GRAFT Left     left elbow    SKIN GRAFT      left elbow         Medications  Current Outpatient Medications   Medication Sig Dispense Refill    Docusate Sodium (COLACE PO) Take by mouth if needed      ferrous sulfate 324 (65 Fe) mg Take 1 tablet (324 mg total) by mouth every other day 90 tablet 1    meclizine (ANTIVERT) 25 mg tablet Take 1 tablet (25 mg total) by mouth as needed in the morning and 1 tablet (25 mg total) as needed at noon and 1 tablet (25 mg total) as needed in the evening for dizziness  30 tablet 0    Prenatal Vit-Fe Fumarate-FA (PRENATAL VITAMIN PO)       metoclopramide (REGLAN) 10 mg tablet Take 1 tablet (10 mg total) by mouth every 6 (six) hours as needed (headache) (Patient not taking: No sig reported) 8 tablet 0     No current facility-administered medications for this visit          Allergies   Allergen Reactions    Benadryl [Diphenhydramine] Hives       Social History     Socioeconomic History    Marital status: /Civil Union     Spouse name: Not on file    Number of children: Not on file    Years of education: Not on file    Highest education level: Not on file   Occupational History    Not on file   Tobacco Use    Smoking status: Never Smoker    Smokeless tobacco: Never Used   Vaping Use    Vaping Use: Never used   Substance and Sexual Activity    Alcohol use: Not Currently     Alcohol/week: 2 0 standard drinks     Types: 2 Glasses of wine per week    Drug use: No    Sexual activity: Yes     Partners: Male   Other Topics Concern    Not on file   Social History Narrative    Not on file     Social Determinants of Health     Financial Resource Strain: Not on file   Food Insecurity: Not on file   Transportation Needs: Not on file   Physical Activity: Not on file   Stress: Not on file   Social Connections: Not on file   Intimate Partner Violence: Not on file   Housing Stability: Not on file        Family History   Problem Relation Age of Onset    Hypothyroidism Mother     Bipolar disorder Mother     Mental illness Mother     Thyroid disease Mother         Hypothyroidism    Schizophrenia Mother     No Known Problems Father     Cancer Maternal Grandmother         Breast cancer    Heart disease Maternal Grandmother     Breast cancer Maternal Grandmother     Lung cancer Maternal Grandfather     Cancer Maternal Grandfather         Lung cancer    Emphysema Maternal Grandfather     Asperger's syndrome Brother     Addiction problem Brother     No Known Problems Son     Varicose Veins Paternal Grandmother     Diabetes Paternal Grandfather     Depression Half-Sister     No Known Problems Brother     Colon cancer Neg Hx     Ovarian cancer Neg Hx        Lab Results   Component Value Date    WBC 12 07 (H) 05/24/2022    HGB 10 2 (L) 05/24/2022    HCT 31 8 (L) 05/24/2022    MCV 98 05/24/2022     05/24/2022      Lab Results   Component Value Date    SODIUM 136 05/24/2022    K 4 1 05/24/2022     05/24/2022    CO2 22 05/24/2022    BUN 6 05/24/2022    CREATININE 0 31 (L) 05/24/2022    GLUC 61 (L) 05/24/2022    CALCIUM 8 3 05/24/2022      Lab Results   Component Value Date    HGBA1C 5 0 08/18/2021      No results found for: CHOL  Lab Results   Component Value Date    HDL 67 08/18/2021     Lab Results   Component Value Date    LDLCALC 103 (H) 08/18/2021     Lab Results   Component Value Date TRIG 54 08/18/2021     No results found for: [place], [place]   Lab Results   Component Value Date    INR 1 03 05/24/2022    INR 1 16 04/19/2016    INR 1 00 04/08/2015    PROTIME 13 3 05/24/2022    PROTIME 14 4 (H) 04/19/2016    PROTIME 12 7 04/08/2015          Patient Active Problem List    Diagnosis Date Noted    Prior fetal macrosomia in third trimester, antepartum 05/25/2022    Migraine headache 05/24/2022    Anemia during pregnancy in third trimester 05/06/2022    34 weeks gestation of pregnancy 01/31/2022    Depression during pregnancy in third trimester 01/31/2022    COVID-19 affecting pregnancy in first trimester 01/13/2022    Bipolar depression ([facility] [address] ) 05/29/2019    Depressive disorder 07/25/2017    Insomnia 07/25/2017       Portions of the record may have been created with voice recognition software  Occasional wrong word or "sound a like" substitutions may have occurred due to the inherent limitations of voice recognition software  Read the chart carefully and recognize, using context, where substitutions have occurred      Sammi Byers DO, [facility] [place]  6/7/2022 11:15 AM

## 2022-06-17 ENCOUNTER — HOSPITAL ENCOUNTER (OUTPATIENT)
Dept: NON INVASIVE DIAGNOSTICS | Facility: HOSPITAL | Age: 22
Discharge: HOME | End: 2022-06-17
Payer: COMMERCIAL

## 2022-06-17 ENCOUNTER — TELEPHONE (OUTPATIENT)
Dept: CARDIOLOGY CLINIC | Facility: CLINIC | Age: 22
End: 2022-06-17

## 2022-06-17 VITALS
SYSTOLIC BLOOD PRESSURE: 116 MMHG | HEART RATE: 99 BPM | WEIGHT: 191 LBS | DIASTOLIC BLOOD PRESSURE: 72 MMHG | HEIGHT: 66 IN | BODY MASS INDEX: 30.7 KG/M2

## 2022-06-17 DIAGNOSIS — R07.9 CHEST PAIN, UNSPECIFIED TYPE: ICD-10-CM

## 2022-06-17 DIAGNOSIS — R06.00 DYSPNEA ON EXERTION: ICD-10-CM

## 2022-06-17 LAB
AORTIC ROOT: 3.1 CM
AORTIC VALVE MEAN VELOCITY: 9.8 M/S
ASCENDING AORTA: 2.9 CM
AV LVOT MEAN GRADIENT: 2 MMHG
AV LVOT PEAK GRADIENT: 4 MMHG
AV MEAN GRADIENT: 4 MMHG
AV PEAK GRADIENT: 8 MMHG
AV VALVE AREA: 2.06 CM2
AV VELOCITY RATIO: 0.71
DOP CALC AO PEAK VEL: 1.4 M/S
DOP CALC AO VTI: 26.9 CM
DOP CALC LVOT AREA: 3.14 CM2
DOP CALC LVOT DIAMETER: 2 CM
DOP CALC LVOT PEAK VEL VTI: 17.66 CM
DOP CALC LVOT PEAK VEL: 0.99 M/S
DOP CALC LVOT STROKE INDEX: 29.6 ML/M2
DOP CALC LVOT STROKE VOLUME: 55.45 CM3
E WAVE DECELERATION TIME: 172 MS
FRACTIONAL SHORTENING: 33 % (ref 28–44)
INTERVENTRICULAR SEPTUM IN DIASTOLE (PARASTERNAL SHORT AXIS VIEW): 0.8 CM
INTERVENTRICULAR SEPTUM: 0.8 CM (ref 0.6–1.1)
LAAS-AP2: 13.7 CM2
LAAS-AP4: 13 CM2
LEFT ATRIUM SIZE: 3.2 CM
LEFT INTERNAL DIMENSION IN SYSTOLE: 3.2 CM (ref 2.1–4)
LEFT VENTRICULAR INTERNAL DIMENSION IN DIASTOLE: 4.8 CM (ref 3.5–6)
LEFT VENTRICULAR POSTERIOR WALL IN END DIASTOLE: 0.8 CM
LEFT VENTRICULAR STROKE VOLUME: 68 ML
LVSV (TEICH): 68 ML
MV E'TISSUE VEL-LAT: 18 CM/S
MV E'TISSUE VEL-SEP: 12 CM/S
MV PEAK A VEL: 0.64 M/S
MV PEAK E VEL: 79 CM/S
MV STENOSIS PRESSURE HALF TIME: 50 MS
MV VALVE AREA P 1/2 METHOD: 4.4 CM2
RIGHT ATRIAL 2D VOLUME: 26 ML
RIGHT ATRIUM AREA SYSTOLE A4C: 11.7 CM2
RIGHT VENTRICLE ID DIMENSION: 3.2 CM
SL CV LEFT ATRIUM LENGTH A2C: 4.6 CM
SL CV LV EF: 63
SL CV PED ECHO LEFT VENTRICLE DIASTOLIC VOLUME (MOD BIPLANE) 2D: 108 ML
SL CV PED ECHO LEFT VENTRICLE SYSTOLIC VOLUME (MOD BIPLANE) 2D: 40 ML
TR MAX PG: 18 MMHG
TR PEAK VELOCITY: 2.1 M/S
TRICUSPID VALVE PEAK REGURGITATION VELOCITY: 2.12 M/S

## 2022-06-17 PROCEDURE — 93306 TTE W/DOPPLER COMPLETE: CPT

## 2022-06-17 PROCEDURE — 93306 TTE W/DOPPLER COMPLETE: CPT | Performed by: INTERNAL MEDICINE

## 2022-06-17 NOTE — TELEPHONE ENCOUNTER
----- Message from Johny Villanueva DO sent at 6/17/2022  2:34 PM EDT -----  Please call the patient regarding their normal result

## 2022-06-20 ENCOUNTER — ROUTINE PRENATAL (OUTPATIENT)
Dept: OBGYN CLINIC | Facility: MEDICAL CENTER | Age: 22
End: 2022-06-20

## 2022-06-20 VITALS
HEIGHT: 66 IN | WEIGHT: 192 LBS | SYSTOLIC BLOOD PRESSURE: 126 MMHG | BODY MASS INDEX: 30.86 KG/M2 | DIASTOLIC BLOOD PRESSURE: 64 MMHG

## 2022-06-20 DIAGNOSIS — O09.293 PRIOR FETAL MACROSOMIA IN THIRD TRIMESTER, ANTEPARTUM: ICD-10-CM

## 2022-06-20 DIAGNOSIS — K61.0 ABSCESS, PERIANAL: ICD-10-CM

## 2022-06-20 DIAGNOSIS — F32.A DEPRESSION DURING PREGNANCY IN THIRD TRIMESTER: Primary | ICD-10-CM

## 2022-06-20 DIAGNOSIS — Z3A.34 34 WEEKS GESTATION OF PREGNANCY: ICD-10-CM

## 2022-06-20 DIAGNOSIS — O99.013 ANEMIA DURING PREGNANCY IN THIRD TRIMESTER: ICD-10-CM

## 2022-06-20 DIAGNOSIS — O99.343 DEPRESSION DURING PREGNANCY IN THIRD TRIMESTER: Primary | ICD-10-CM

## 2022-06-20 PROCEDURE — PNV: Performed by: NURSE PRACTITIONER

## 2022-06-20 PROCEDURE — 87150 DNA/RNA AMPLIFIED PROBE: CPT | Performed by: NURSE PRACTITIONER

## 2022-06-20 NOTE — PROGRESS NOTES
Denies loss of fluid, vaginal bleeding and abdominal pain  Confirms frequent fetal movement  Doing fetal kick counts  Tolerating prenatal vitamin, iron and Colace well  Has not needed meclizine  Is concerned regarding possible hemorrhoid  Depression/anxiety following with [facility] next appointment 22  Reviewed normal echo encouraged to keep appointment/follow-up with cardiologist   Denies other questions or concerns at today's visit  PE:-perianal abscess small amount of drainage  No erythema/no signs of infection  BP:126/64 weight: +43lb  -continue prenatal vitamins daily  -continue fetal kick counts daily  -continue vaginal/perineal massage 1-4 times per week  -perianal abscess reviewed with patient will have small amount of discharge over the next few days  Signs and symptoms report reviewed including increasing in size, fever, chills or pain  -depression/anxiety follow-up with therapy as ordered  Encouraged to call office with worsening symptoms, thoughts of self-harm or harm to others  -follow-up with cardiology as scheduled  -follow-up  Center scheduled for 22  -GBS collected-no penicillin allergy  -common discomforts of pregnancy and precautions reviewed  Signs and symptoms of labor reviewed    Written information provided about COVID 19  RTO 1 week f/u abscess and GBS

## 2022-06-22 LAB — GP B STREP DNA SPEC QL NAA+PROBE: NEGATIVE

## 2022-06-25 ENCOUNTER — HOSPITAL ENCOUNTER (OUTPATIENT)
Facility: HOSPITAL | Age: 22
End: 2022-06-25
Admitting: STUDENT IN AN ORGANIZED HEALTH CARE EDUCATION/TRAINING PROGRAM
Payer: COMMERCIAL

## 2022-06-25 ENCOUNTER — NURSE TRIAGE (OUTPATIENT)
Dept: OTHER | Facility: OTHER | Age: 22
End: 2022-06-25

## 2022-06-25 ENCOUNTER — HOSPITAL ENCOUNTER (OUTPATIENT)
Facility: HOSPITAL | Age: 22
Discharge: HOME | End: 2022-06-25
Attending: STUDENT IN AN ORGANIZED HEALTH CARE EDUCATION/TRAINING PROGRAM | Admitting: STUDENT IN AN ORGANIZED HEALTH CARE EDUCATION/TRAINING PROGRAM
Payer: COMMERCIAL

## 2022-06-25 VITALS
HEART RATE: 90 BPM | SYSTOLIC BLOOD PRESSURE: 109 MMHG | RESPIRATION RATE: 18 BRPM | DIASTOLIC BLOOD PRESSURE: 62 MMHG | TEMPERATURE: 98.1 F

## 2022-06-25 PROBLEM — O26.899 VAGINAL DISCHARGE DURING PREGNANCY: Status: ACTIVE | Noted: 2022-06-25

## 2022-06-25 PROBLEM — Z3A.36 36 WEEKS GESTATION OF PREGNANCY: Status: ACTIVE | Noted: 2022-01-31

## 2022-06-25 PROBLEM — N89.8 VAGINAL DISCHARGE DURING PREGNANCY: Status: ACTIVE | Noted: 2022-06-25

## 2022-06-25 PROCEDURE — 76815 OB US LIMITED FETUS(S): CPT | Performed by: STUDENT IN AN ORGANIZED HEALTH CARE EDUCATION/TRAINING PROGRAM

## 2022-06-25 PROCEDURE — 99214 OFFICE O/P EST MOD 30 MIN: CPT

## 2022-06-25 PROCEDURE — 99213 OFFICE O/P EST LOW 20 MIN: CPT | Performed by: STUDENT IN AN ORGANIZED HEALTH CARE EDUCATION/TRAINING PROGRAM

## 2022-06-25 PROCEDURE — 76815 OB US LIMITED FETUS(S): CPT | Performed by: OBSTETRICS & GYNECOLOGY

## 2022-06-25 NOTE — PROGRESS NOTES
L&D Triage Note - OB/GYN  Swapna Arellano 19 y o  female MRN: [id number]  Unit/Bed#: L&D 322-01 Encounter: [id number]    Swapna Arellano is a patient of [facility]    SUBJECTIVE    LUANNE: Estimated Date of Delivery: 7/18/22    HPI:  19 y o  [id number] 36w5d presents with complaint of leaking fluid  Patient reports "feeling wet" after using the bathroom today around 5 PM  She was feeling leaking but it was not "a lot of fluid"  She reports that her underwear was damp and "it feels wet down there"  She denies dysuria, recent intercourse, vaginal bleeding  She reports good fetal movement and no contractions  ROS:  Constitutional: Negative  Respiratory: Negative  Cardiovascular: Negative    Gastrointestinal: Negative    OBJECTIVE:  /62 (BP Location: Right arm)   Pulse 90   Temp 98 1 °F (36 7 °C) (Oral)   Resp 18   LMP 10/11/2021   There is no height or weight on file to calculate BMI  Physical Exam  Constitutional:       Appearance: Normal appearance  Pulmonary:      Effort: Pulmonary effort is normal    Abdominal:      Palpations: Abdomen is soft  Tenderness: There is no abdominal tenderness  There is no guarding or rebound  Neurological:      Mental Status: She is alert  Speculum exam: No pooling noted on exam with valsalva      Membrane status   - neg ferning   - neg nitrazene   - neg pooling     SVE: cl/th/high   Method: Manual  OB Examiner: defruscio    FHT:  Baseline Rate: 130 bpm  Variability: Moderate 6-25 bpm  Accelerations: 15 x 15 or greater, At variable times  Decelerations: None    TOCO:   Contraction Frequency (minutes): irregular  Contraction Duration (seconds): 60-90  Contraction Quality: Mild    IMAGING:        TAUS   JENNIFER      - Q1 2 5cm     - Q2 2 1cm     - Q3 2 2cm     - Q4 4 1cm     - Total: 10 9cm   Presentation: vertex     Labs: No results found for this or any previous visit (from the past 24 hour(s))        ASSESSMENT  Swapna Arellano is a 19 y o  [id number] at 36w5d here for rule out rupture of membranes  Membranes intact  Ferning, pooling and nitrazine are all negative  JENNIFER WNL  Patient was given precautions on return- increased leaking, vaginal bleeding, decreased fetal movement or contractions  PLAN  #1  R/o ROM  · Speculum   · JENNIFER   · Ferning/pooling/nitrazine  · NST  · SVE      Discharge instructions  · Patient instructed to call if experiencing worsening contractions, vaginal bleeding, loss of fluid or decreased fetal movement  · Will follow up with OBGYN as scheduled      D/w Dr Tarik Stafford   OB/GYN PGY-2  6/25/2022  8:41 PM

## 2022-06-25 NOTE — TELEPHONE ENCOUNTER
Reason for Disposition   Leakage of fluid from vagina    Answer Assessment - Initial Assessment Questions  1  ONSET: "When did the symptoms begin?"         4:30 just now  2  CONTRACTIONS: "Are you having any contractions?" If yes, ask: "Describe the contractions that you are having " (e g , duration, frequency, regularity, severity)      No  3  LUANNE: "What date are you expecting to deliver?"      7/18  4  PARITY: "Have you had a baby before?" If Yes, ask: "How long did the labor last?"      2nd  5  FETAL MOVEMENT: "Has the baby's movement decreased or changed significantly from normal?"      Moving a lot   6   OTHER SYMPTOMS: "Do you have any other symptoms?" (e g , abdominal pain, vaginal bleeding, fever, hand/facial swelling)      No    Protocols used: PREGNANCY - [facility] OF [facility]

## 2022-06-25 NOTE — TELEPHONE ENCOUNTER
Regardin weeks possible water broke   ----- Message from Torrey Bruno sent at 2022  4:39 PM EDT -----  " I am 36 weeks and I think my water broke "

## 2022-06-25 NOTE — TELEPHONE ENCOUNTER
Pt calling states that she just felt a gush of fluid and now she has a continuous trickle of fluid from her vagina      On call provider paged,

## 2022-06-27 ENCOUNTER — TELEMEDICINE (OUTPATIENT)
Dept: BEHAVIORAL/MENTAL HEALTH CLINIC | Facility: CLINIC | Age: 22
End: 2022-06-27
Payer: COMMERCIAL

## 2022-06-27 DIAGNOSIS — F32.A DEPRESSIVE DISORDER: Primary | ICD-10-CM

## 2022-06-27 DIAGNOSIS — F41.9 ANXIETY: ICD-10-CM

## 2022-06-27 PROCEDURE — 90832 PSYTX W PT 30 MINUTES: CPT | Performed by: SOCIAL WORKER

## 2022-06-27 NOTE — PSYCH
Psychotherapy Provided: Individual Psychotherapy 30 minutes     Length of time in session: 30 minutes, follow up in 2 week    Encounter Diagnosis     ICD-10-CM    1  Depressive disorder  F32  A    2  Anxiety  F41 9        Goals addressed in session: Goal 1     Pain:      none    0    Current suicide risk : Low         Behavioral Health Treatment Plan [facility]: Diagnosis and Treatment Plan explained to Kyler Ramey relates understanding diagnosis and is agreeable to Treatment Plan  YES  Virtual Regular Visit    Verification of patient location:    Patient is located in the following state in which I hold an active license [place]      Assessment/Plan:    Problem List Items Addressed This Visit        Other    Depressive disorder - Primary      Other Visit Diagnoses     Anxiety              Goals addressed in session: Goal 1 : Reduce anxiety & Depression         Reason for visit is No chief complaint on file  Encounter provider Trevon Harper LCSW    Provider located at [address] [id number] [phone]      Recent Visits  No visits were found meeting these conditions  Showing recent visits within past 7 days and meeting all other requirements  Today's Visits  Date Type Provider Dept   06/27/22 Telemedicine Trevon Harper LCSW [facility] [place]   Showing today's visits and meeting all other requirements  Future Appointments  No visits were found meeting these conditions  Showing future appointments within next 150 days and meeting all other requirements       The patient was identified by name and date of birth  Shahid Stephenson was informed that this is a telemedicine visit and [facility] the [facility] and patient was informed that this is a secure, HIPAA-compliant platform  She agrees to proceed     My office door was closed  No one else was in the room    She acknowledged consent and understanding of privacy and security of the video platform  The patient has agreed to participate and understands they can discontinue the visit at any time  Patient is aware this is a billable service  Jj Navarro is a 19 y o  female who presents for follow up  Met with pt from 1300 - 1334  Pt stated she has been "okay "  She has 3 more weeks left in her pregnancy and she is ready for it to be over  She is frustrated with having to live at her StepMIL's place  Pt and her  continue to argue due to stressors  Their son, Jeramie Mccain is now acting out as well  Pt reported that they were able to get their closing date moved up to 7/11 since the original closing date was the same as her due date  They continue to prepare to move and are very much looking forward to having their own space again  Pt has been experiencing migraines with aura and loss of vision recently  This has been preventing her from getting outside with her son  Pt is also frustrated that she has not been eating well (healthier foods)  She hasn't been cooking because everyone in the house makes a mess in the kitchen and they don't clean up after themselves  Pt didn't mind cleaning the kitchen but she does not have the energy anymore  Pt is also feeling frustrated that she let her skin care routine go by the [employer]  Processed pt's feelings at length and discussed coping skills  Pt will do her best to get her son outside as often as possible and spend quality time with him before the baby is born         HPI     Past Medical History:   Diagnosis Date    Depression 2011    History of transfusion     2014    Migraine headache 5/24/2022    Varicella     vaccine       Past Surgical History:   Procedure Laterality Date    SKIN GRAFT Left     left elbow    SKIN GRAFT      left elbow       Current Outpatient Medications   Medication Sig Dispense Refill    Docusate Sodium (COLACE PO) Take by mouth if needed      ferrous sulfate 324 (65 Fe) mg Take 1 tablet (324 mg total) by mouth every other day 90 tablet 1    meclizine (ANTIVERT) 25 mg tablet Take 1 tablet (25 mg total) by mouth as needed in the morning and 1 tablet (25 mg total) as needed at noon and 1 tablet (25 mg total) as needed in the evening for dizziness  (Patient not taking: Reported on 6/20/2022) 30 tablet 0    Prenatal Vit-Fe Fumarate-FA (PRENATAL VITAMIN PO)        No current facility-administered medications for this visit  Allergies   Allergen Reactions    Benadryl [Diphenhydramine] Hives       Review of Systems: Pt was pleasant, cooperative and engaged  Video Exam    There were no vitals filed for this visit  Physical Exam     I spent 30 minutes directly with the patient during this visit    VIRTUAL VISIT DISCLAIMER    Lianne Loya verbally agrees to participate in [employer]  Pt is aware that [employer] could be limited without vital signs or the ability to perform a full hands-on physical Suki Webber understands she or the provider may request at any time to terminate the video visit and request the patient to seek care or treatment in person

## 2022-07-04 NOTE — PROGRESS NOTES
Please refer to the [facility] ultrasound report in Ob Procedures for additional information regarding today's visit

## 2022-07-06 ENCOUNTER — ULTRASOUND (OUTPATIENT)
Dept: PERINATAL CARE | Facility: OTHER | Age: 22
End: 2022-07-06
Payer: COMMERCIAL

## 2022-07-06 ENCOUNTER — APPOINTMENT (OUTPATIENT)
Dept: LAB | Facility: CLINIC | Age: 22
End: 2022-07-06
Payer: COMMERCIAL

## 2022-07-06 ENCOUNTER — TELEPHONE (OUTPATIENT)
Dept: OBGYN CLINIC | Facility: CLINIC | Age: 22
End: 2022-07-06

## 2022-07-06 ENCOUNTER — ROUTINE PRENATAL (OUTPATIENT)
Dept: OBGYN CLINIC | Facility: CLINIC | Age: 22
End: 2022-07-06

## 2022-07-06 VITALS
DIASTOLIC BLOOD PRESSURE: 71 MMHG | WEIGHT: 193.4 LBS | SYSTOLIC BLOOD PRESSURE: 122 MMHG | HEIGHT: 66 IN | BODY MASS INDEX: 31.08 KG/M2 | HEART RATE: 108 BPM

## 2022-07-06 VITALS — BODY MASS INDEX: 30.99 KG/M2 | SYSTOLIC BLOOD PRESSURE: 110 MMHG | DIASTOLIC BLOOD PRESSURE: 70 MMHG | WEIGHT: 192 LBS

## 2022-07-06 DIAGNOSIS — O09.293 PRIOR FETAL MACROSOMIA IN THIRD TRIMESTER, ANTEPARTUM: ICD-10-CM

## 2022-07-06 DIAGNOSIS — Z3A.38 38 WEEKS GESTATION OF PREGNANCY: ICD-10-CM

## 2022-07-06 DIAGNOSIS — I10 ELEVATED BLOOD PRESSURE READING IN OFFICE WITH DIAGNOSIS OF HYPERTENSION: ICD-10-CM

## 2022-07-06 DIAGNOSIS — Z3A.38 38 WEEKS GESTATION OF PREGNANCY: Primary | ICD-10-CM

## 2022-07-06 DIAGNOSIS — O36.63X0 LARGE FOR GESTATIONAL AGE FETUS AFFECTING MOTHER, ANTEPARTUM, THIRD TRIMESTER, SINGLE GESTATION: Primary | ICD-10-CM

## 2022-07-06 LAB
ALBUMIN SERPL BCP-MCNC: 2.9 G/DL (ref 3.5–5)
ALP SERPL-CCNC: 107 U/L (ref 46–116)
ALT SERPL W P-5'-P-CCNC: 13 U/L (ref 12–78)
ANION GAP SERPL CALCULATED.3IONS-SCNC: 8 MMOL/L (ref 4–13)
AST SERPL W P-5'-P-CCNC: 19 U/L (ref 5–45)
BASOPHILS # BLD MANUAL: 0 THOUSAND/UL (ref 0–0.1)
BASOPHILS NFR MAR MANUAL: 0 % (ref 0–1)
BILIRUB SERPL-MCNC: 0.32 MG/DL (ref 0.2–1)
BUN SERPL-MCNC: 6 MG/DL (ref 5–25)
CALCIUM ALBUM COR SERPL-MCNC: 11 MG/DL (ref 8.3–10.1)
CALCIUM SERPL-MCNC: 10.1 MG/DL (ref 8.3–10.1)
CHLORIDE SERPL-SCNC: 107 MMOL/L (ref 100–108)
CO2 SERPL-SCNC: 21 MMOL/L (ref 21–32)
CREAT SERPL-MCNC: 0.59 MG/DL (ref 0.6–1.3)
CREAT UR-MCNC: 32.1 MG/DL
EOSINOPHIL # BLD MANUAL: 0 THOUSAND/UL (ref 0–0.4)
EOSINOPHIL NFR BLD MANUAL: 0 % (ref 0–6)
ERYTHROCYTE [DISTWIDTH] IN BLOOD BY AUTOMATED COUNT: 14.9 % (ref 11.6–15.1)
GFR SERPL CREATININE-BSD FRML MDRD: 129 ML/MIN/1.73SQ M
GLUCOSE P FAST SERPL-MCNC: 74 MG/DL (ref 65–99)
HCT VFR BLD AUTO: 32.2 % (ref 34.8–46.1)
HGB BLD-MCNC: 10.4 G/DL (ref 11.5–15.4)
LYMPHOCYTES # BLD AUTO: 0.54 THOUSAND/UL (ref 0.6–4.47)
LYMPHOCYTES # BLD AUTO: 4 % (ref 14–44)
MCH RBC QN AUTO: 30 PG (ref 26.8–34.3)
MCHC RBC AUTO-ENTMCNC: 32.3 G/DL (ref 31.4–37.4)
MCV RBC AUTO: 93 FL (ref 82–98)
MONOCYTES # BLD AUTO: 0.4 THOUSAND/UL (ref 0–1.22)
MONOCYTES NFR BLD: 3 % (ref 4–12)
MYELOCYTES NFR BLD MANUAL: 1 % (ref 0–1)
NEUTROPHILS # BLD MANUAL: 12.08 THOUSAND/UL (ref 1.85–7.62)
NEUTS BAND NFR BLD MANUAL: 1 % (ref 0–8)
NEUTS SEG NFR BLD AUTO: 89 % (ref 43–75)
PLATELET # BLD AUTO: 234 THOUSANDS/UL (ref 149–390)
PLATELET BLD QL SMEAR: ADEQUATE
PMV BLD AUTO: 10.7 FL (ref 8.9–12.7)
POLYCHROMASIA BLD QL SMEAR: PRESENT
POTASSIUM SERPL-SCNC: 3.9 MMOL/L (ref 3.5–5.3)
PROT SERPL-MCNC: 7 G/DL (ref 6.4–8.2)
PROT UR-MCNC: 8 MG/DL
PROT/CREAT UR: 0.25 MG/G{CREAT} (ref 0–0.1)
RBC # BLD AUTO: 3.47 MILLION/UL (ref 3.81–5.12)
RBC MORPH BLD: PRESENT
SODIUM SERPL-SCNC: 136 MMOL/L (ref 136–145)
VARIANT LYMPHS # BLD AUTO: 2 %
WBC # BLD AUTO: 13.42 THOUSAND/UL (ref 4.31–10.16)

## 2022-07-06 PROCEDURE — 85007 BL SMEAR W/DIFF WBC COUNT: CPT

## 2022-07-06 PROCEDURE — 76816 OB US FOLLOW-UP PER FETUS: CPT | Performed by: OBSTETRICS & GYNECOLOGY

## 2022-07-06 PROCEDURE — 84156 ASSAY OF PROTEIN URINE: CPT | Performed by: OBSTETRICS & GYNECOLOGY

## 2022-07-06 PROCEDURE — 82570 ASSAY OF URINE CREATININE: CPT | Performed by: OBSTETRICS & GYNECOLOGY

## 2022-07-06 PROCEDURE — 85027 COMPLETE CBC AUTOMATED: CPT

## 2022-07-06 PROCEDURE — PNV: Performed by: OBSTETRICS & GYNECOLOGY

## 2022-07-06 PROCEDURE — 80053 COMPREHEN METABOLIC PANEL: CPT

## 2022-07-06 PROCEDURE — 36415 COLL VENOUS BLD VENIPUNCTURE: CPT

## 2022-07-06 NOTE — PROGRESS NOTES
Billie Jade is a 19y o  year old  at 38w2d for routine prenatal visit    + FM, no vaginal bleeding, contractions, or LOF  Complaints: Yes - states has been getting headaches (currently does not have one ) as well as dizzy spells at home , states checked BP and had a systolic of 608   States her diastolics always normal / concerned that she has preeclampsia - discussed no documented elevated BP at office , I did order [address] labs  Preeclamptic signs and symptoms for which she needs follow up discussed   Most recent ultrasound and labs reviewed  EFV > 95% from [address] done today - we discussed IOL from 39 weeks on/ states states she desires IOL as soon as possible after  (closing on her new house that day) / we discussed IOL for  - aware physicians on she has not met but was ok with that / I spoke to on call providers and agreement for IOl prior to scheduling  Labor precautions given  Discussed risks of shoulder dystocia    Aware in most cases simple maneuvers performed with good outcomes , we did discuss that if shoulder dystocia does occur this has risk of injury to mom or baby / most of the time no permament injury occurs but in rare intnces there can be permanent impairment and in rare instances death

## 2022-07-06 NOTE — TELEPHONE ENCOUNTER
Patient had appointment today and was told to request either 7/12 or 7/13 for induction - please call and schedule

## 2022-07-06 NOTE — LETTER
July 6, 2022     WOODY Padilla  [address]  [address] [place] [address]     Patient: Leila Guidry   YOB: 2000   Date of Visit: 7/6/2022       Dear Dr Delia Lion: Thank you for referring Vernell Schaefer to me for evaluation  Below are my notes for this consultation  If you have questions, please do not hesitate to call me  I look forward to following your patient along with you  Sincerely,        Zach Adkins MD        CC: No Recipients  Zach Adkins MD  7/4/2022  4:05 PM  Sign when Signing Visit  Please refer to the [facility] ultrasound report in Ob Procedures for additional information regarding today's visit

## 2022-07-06 NOTE — PATIENT INSTRUCTIONS
Kick Counts in Pregnancy   WHAT YOU NEED TO KNOW:   Kick counts measure how much your baby is moving in your womb  A kick from your baby can be felt as a twist, turn, swish, roll, or jab  It is common to feel your baby kicking at 26 to 28 weeks of pregnancy  You may feel your baby kick as early as 20 weeks of pregnancy  You may want to start counting at 28 weeks  DISCHARGE INSTRUCTIONS:   Contact your doctor immediately if:   You feel a change in the number of kicks or movements of your baby  You feel fewer than 10 kicks within 2 hours  You have questions or concerns about your baby's movements  Why measure kick counts:  Your baby's movement may provide information about your baby's health  He or she may move less, or not at all, if there are problems  Your baby may move less if he or she is not getting enough oxygen or nutrition from the placenta  Do not smoke while you are pregnant  Smoking decreases the amount of oxygen that gets to your baby  Talk to your healthcare provider if you need help to quit smoking  Tell your healthcare provider as soon as you feel a change in your baby's movements  When to measure kick counts:   Measure kick counts at the same time every day  Measure kick counts when your baby is awake and most active  Your baby may be most active in the evening  How to measure kick counts:  Check that your baby is awake before you measure kick counts  You can wake up your baby by lightly pushing on your belly, walking, or drinking something cold  Your healthcare provider may tell you different ways to measure kick counts  You may be told to do the following:  Use a chart or clock to keep track of the time you start and finish counting  Sit in a chair or lie on your left side  Place your hands on the largest part of your belly  Count until you reach 10 kicks  Write down how much time it takes to count 10 kicks  It may take 30 minutes to 2 hours to count 10 kicks  It should not take more than 2 hours to count 10 kicks  Follow up with your doctor as directed:  Write down your questions so you remember to ask them during your visits  © Copyright [employer] 2022 Information is for End User's use only and may not be sold, redistributed or otherwise used for commercial purposes  All illustrations and images included in CareNotes® are the copyrighted property of A D A M , Inc  or [facility] Caridad Salmeron   The above information is an  only  It is not intended as medical advice for individual conditions or treatments  Talk to your doctor, nurse or pharmacist before following any medical regimen to see if it is safe and effective for you

## 2022-07-12 ENCOUNTER — HOSPITAL ENCOUNTER (OUTPATIENT)
Dept: LABOR AND DELIVERY | Facility: HOSPITAL | Age: 22
Discharge: HOME | End: 2022-07-12
Payer: COMMERCIAL

## 2022-07-12 ENCOUNTER — HOSPITAL ENCOUNTER (INPATIENT)
Facility: HOSPITAL | Age: 22
LOS: 3 days | Discharge: HOME | End: 2022-07-15
Attending: OBSTETRICS & GYNECOLOGY | Admitting: OBSTETRICS & GYNECOLOGY
Payer: COMMERCIAL

## 2022-07-12 DIAGNOSIS — Z78.9 EXCLUSIVELY BREASTFEED INFANT: ICD-10-CM

## 2022-07-12 DIAGNOSIS — Z3A.39 39 WEEKS GESTATION OF PREGNANCY: ICD-10-CM

## 2022-07-12 DIAGNOSIS — Z91.89 AT RISK FOR POSTPARTUM DEPRESSION: ICD-10-CM

## 2022-07-12 DIAGNOSIS — Z30.011 ENCOUNTER FOR INITIAL PRESCRIPTION OF CONTRACEPTIVE PILLS: ICD-10-CM

## 2022-07-12 LAB
ABO GROUP BLD: NORMAL
ALBUMIN SERPL BCP-MCNC: 2.8 G/DL (ref 3.5–5)
ALP SERPL-CCNC: 108 U/L (ref 46–116)
ALT SERPL W P-5'-P-CCNC: 20 U/L (ref 12–78)
ANION GAP SERPL CALCULATED.3IONS-SCNC: 12 MMOL/L (ref 4–13)
AST SERPL W P-5'-P-CCNC: 14 U/L (ref 5–45)
BILIRUB SERPL-MCNC: 0.17 MG/DL (ref 0.2–1)
BLD GP AB SCN SERPL QL: NEGATIVE
BUN SERPL-MCNC: 10 MG/DL (ref 5–25)
CALCIUM ALBUM COR SERPL-MCNC: 10.3 MG/DL (ref 8.3–10.1)
CALCIUM SERPL-MCNC: 9.3 MG/DL (ref 8.3–10.1)
CHLORIDE SERPL-SCNC: 104 MMOL/L (ref 100–108)
CO2 SERPL-SCNC: 23 MMOL/L (ref 21–32)
CREAT SERPL-MCNC: 0.74 MG/DL (ref 0.6–1.3)
CREAT UR-MCNC: 102.2 MG/DL
ERYTHROCYTE [DISTWIDTH] IN BLOOD BY AUTOMATED COUNT: 14.6 % (ref 11.6–15.1)
GFR SERPL CREATININE-BSD FRML MDRD: 114 ML/MIN/1.73SQ M
GLUCOSE SERPL-MCNC: 100 MG/DL (ref 65–140)
HCT VFR BLD AUTO: 30.1 % (ref 34.8–46.1)
HGB BLD-MCNC: 9.9 G/DL (ref 11.5–15.4)
MCH RBC QN AUTO: 30.5 PG (ref 26.8–34.3)
MCHC RBC AUTO-ENTMCNC: 32.9 G/DL (ref 31.4–37.4)
MCV RBC AUTO: 93 FL (ref 82–98)
PLATELET # BLD AUTO: 194 THOUSANDS/UL (ref 149–390)
PMV BLD AUTO: 9.9 FL (ref 8.9–12.7)
POTASSIUM SERPL-SCNC: 3.1 MMOL/L (ref 3.5–5.3)
PROT SERPL-MCNC: 6.8 G/DL (ref 6.4–8.2)
PROT UR-MCNC: 15 MG/DL
PROT/CREAT UR: 0.15 MG/G{CREAT} (ref 0–0.1)
RBC # BLD AUTO: 3.25 MILLION/UL (ref 3.81–5.12)
RH BLD: POSITIVE
SODIUM SERPL-SCNC: 139 MMOL/L (ref 136–145)
SPECIMEN EXPIRATION DATE: NORMAL
WBC # BLD AUTO: 12.08 THOUSAND/UL (ref 4.31–10.16)

## 2022-07-12 PROCEDURE — 82570 ASSAY OF URINE CREATININE: CPT

## 2022-07-12 PROCEDURE — 86592 SYPHILIS TEST NON-TREP QUAL: CPT

## 2022-07-12 PROCEDURE — 80053 COMPREHEN METABOLIC PANEL: CPT

## 2022-07-12 PROCEDURE — 84156 ASSAY OF PROTEIN URINE: CPT

## 2022-07-12 PROCEDURE — NC001 PR NO CHARGE: Performed by: OBSTETRICS & GYNECOLOGY

## 2022-07-12 PROCEDURE — 86850 RBC ANTIBODY SCREEN: CPT

## 2022-07-12 PROCEDURE — 4A1HXCZ MONITORING OF PRODUCTS OF CONCEPTION, CARDIAC RATE, EXTERNAL APPROACH: ICD-10-PCS | Performed by: OBSTETRICS & GYNECOLOGY

## 2022-07-12 PROCEDURE — 86900 BLOOD TYPING SEROLOGIC ABO: CPT

## 2022-07-12 PROCEDURE — 10907ZC DRAINAGE OF AMNIOTIC FLUID, THERAPEUTIC FROM PRODUCTS OF CONCEPTION, VIA NATURAL OR ARTIFICIAL OPENING: ICD-10-PCS | Performed by: OBSTETRICS & GYNECOLOGY

## 2022-07-12 PROCEDURE — 3E033VJ INTRODUCTION OF OTHER HORMONE INTO PERIPHERAL VEIN, PERCUTANEOUS APPROACH: ICD-10-PCS | Performed by: OBSTETRICS & GYNECOLOGY

## 2022-07-12 PROCEDURE — 85027 COMPLETE CBC AUTOMATED: CPT

## 2022-07-12 PROCEDURE — 86901 BLOOD TYPING SEROLOGIC RH(D): CPT

## 2022-07-12 PROCEDURE — 3E0P7VZ INTRODUCTION OF HORMONE INTO FEMALE REPRODUCTIVE, VIA NATURAL OR ARTIFICIAL OPENING: ICD-10-PCS | Performed by: OBSTETRICS & GYNECOLOGY

## 2022-07-12 RX ORDER — SODIUM CHLORIDE, SODIUM LACTATE, POTASSIUM CHLORIDE, CALCIUM CHLORIDE 600; 310; 30; 20 MG/100ML; MG/100ML; MG/100ML; MG/100ML
125 INJECTION, SOLUTION INTRAVENOUS CONTINUOUS
Status: DISCONTINUED | OUTPATIENT
Start: 2022-07-12 | End: 2022-07-13

## 2022-07-12 RX ORDER — ONDANSETRON 2 MG/ML
4 INJECTION INTRAMUSCULAR; INTRAVENOUS EVERY 6 HOURS PRN
Status: DISCONTINUED | OUTPATIENT
Start: 2022-07-12 | End: 2022-07-13

## 2022-07-12 RX ADMIN — Medication 25 MCG: at 21:25

## 2022-07-12 RX ADMIN — SODIUM CHLORIDE, SODIUM LACTATE, POTASSIUM CHLORIDE, AND CALCIUM CHLORIDE 125 ML/HR: .6; .31; .03; .02 INJECTION, SOLUTION INTRAVENOUS at 21:25

## 2022-07-13 ENCOUNTER — ANESTHESIA (INPATIENT)
Dept: ANESTHESIOLOGY | Facility: HOSPITAL | Age: 22
End: 2022-07-13
Payer: COMMERCIAL

## 2022-07-13 ENCOUNTER — ANESTHESIA EVENT (INPATIENT)
Dept: ANESTHESIOLOGY | Facility: HOSPITAL | Age: 22
End: 2022-07-13
Payer: COMMERCIAL

## 2022-07-13 LAB
BASE EXCESS BLDCOA CALC-SCNC: -3.8 MMOL/L (ref 3–11)
BASE EXCESS BLDCOV CALC-SCNC: -2.6 MMOL/L (ref 1–9)
HCO3 BLDCOA-SCNC: 24.4 MMOL/L (ref 17.3–27.3)
HCO3 BLDCOV-SCNC: 20.2 MMOL/L (ref 12.2–28.6)
OXYHGB MFR BLDCOA: 9 %
OXYHGB MFR BLDCOV: 83.8 %
PCO2 BLDCOA: 57.1 MM[HG] (ref 30–60)
PCO2 BLDCOV: 30.9 MM HG (ref 27–43)
PH BLDCOA: 7.25 [PH] (ref 7.23–7.43)
PH BLDCOV: 7.43 [PH] (ref 7.19–7.49)
PO2 BLDCOA: <10 MM HG (ref 5–25)
PO2 BLDCOV: 38.3 MM HG (ref 15–45)
RPR SER QL: NORMAL
SAO2 % BLDCOV: 19.9 ML/DL

## 2022-07-13 PROCEDURE — 82805 BLOOD GASES W/O2 SATURATION: CPT | Performed by: OBSTETRICS & GYNECOLOGY

## 2022-07-13 PROCEDURE — NC001 PR NO CHARGE: Performed by: OBSTETRICS & GYNECOLOGY

## 2022-07-13 PROCEDURE — 59409 OBSTETRICAL CARE: CPT | Performed by: OBSTETRICS & GYNECOLOGY

## 2022-07-13 RX ORDER — ACETAMINOPHEN 325 MG/1
650 TABLET ORAL EVERY 4 HOURS PRN
Status: DISCONTINUED | OUTPATIENT
Start: 2022-07-13 | End: 2022-07-15 | Stop reason: HOSPADM

## 2022-07-13 RX ORDER — OXYTOCIN/RINGER'S LACTATE 30/500 ML
250 PLASTIC BAG, INJECTION (ML) INTRAVENOUS
Status: ACTIVE | OUTPATIENT
Start: 2022-07-13 | End: 2022-07-13

## 2022-07-13 RX ORDER — KETOROLAC TROMETHAMINE 30 MG/ML
30 INJECTION, SOLUTION INTRAMUSCULAR; INTRAVENOUS EVERY 6 HOURS PRN
Status: DISCONTINUED | OUTPATIENT
Start: 2022-07-13 | End: 2022-07-15 | Stop reason: HOSPADM

## 2022-07-13 RX ORDER — ROPIVACAINE HYDROCHLORIDE 2 MG/ML
INJECTION, SOLUTION EPIDURAL; INFILTRATION; PERINEURAL AS NEEDED
Status: DISCONTINUED | OUTPATIENT
Start: 2022-07-13 | End: 2022-07-13 | Stop reason: HOSPADM

## 2022-07-13 RX ORDER — IBUPROFEN 600 MG/1
600 TABLET ORAL EVERY 6 HOURS
Status: DISCONTINUED | OUTPATIENT
Start: 2022-07-13 | End: 2022-07-15 | Stop reason: HOSPADM

## 2022-07-13 RX ORDER — LIDOCAINE HYDROCHLORIDE AND EPINEPHRINE 15; 5 MG/ML; UG/ML
INJECTION, SOLUTION EPIDURAL AS NEEDED
Status: DISCONTINUED | OUTPATIENT
Start: 2022-07-13 | End: 2022-07-13 | Stop reason: HOSPADM

## 2022-07-13 RX ORDER — OXYTOCIN/RINGER'S LACTATE 30/500 ML
1-30 PLASTIC BAG, INJECTION (ML) INTRAVENOUS
Status: DISCONTINUED | OUTPATIENT
Start: 2022-07-13 | End: 2022-07-13

## 2022-07-13 RX ORDER — FENTANYL CITRATE 50 UG/ML
INJECTION, SOLUTION INTRAMUSCULAR; INTRAVENOUS
Status: DISPENSED
Start: 2022-07-13 | End: 2022-07-13

## 2022-07-13 RX ORDER — LIDOCAINE HYDROCHLORIDE 10 MG/ML
INJECTION, SOLUTION EPIDURAL; INFILTRATION; INTRACAUDAL; PERINEURAL AS NEEDED
Status: DISCONTINUED | OUTPATIENT
Start: 2022-07-13 | End: 2022-07-13 | Stop reason: HOSPADM

## 2022-07-13 RX ORDER — DOCUSATE SODIUM 100 MG/1
100 CAPSULE, LIQUID FILLED ORAL 2 TIMES DAILY PRN
Status: DISCONTINUED | OUTPATIENT
Start: 2022-07-13 | End: 2022-07-15 | Stop reason: HOSPADM

## 2022-07-13 RX ORDER — FENTANYL CITRATE 50 UG/ML
INJECTION, SOLUTION INTRAMUSCULAR; INTRAVENOUS AS NEEDED
Status: DISCONTINUED | OUTPATIENT
Start: 2022-07-13 | End: 2022-07-13 | Stop reason: HOSPADM

## 2022-07-13 RX ADMIN — ROPIVACAINE HYDROCHLORIDE 5 ML: 2 INJECTION, SOLUTION EPIDURAL; INFILTRATION at 04:43

## 2022-07-13 RX ADMIN — DOCUSATE SODIUM 100 MG: 100 CAPSULE, LIQUID FILLED ORAL at 17:17

## 2022-07-13 RX ADMIN — ROPIVACAINE HYDROCHLORIDE 4 ML: 2 INJECTION, SOLUTION EPIDURAL; INFILTRATION at 04:45

## 2022-07-13 RX ADMIN — SODIUM CHLORIDE, SODIUM LACTATE, POTASSIUM CHLORIDE, AND CALCIUM CHLORIDE 999 ML/HR: .6; .31; .03; .02 INJECTION, SOLUTION INTRAVENOUS at 03:48

## 2022-07-13 RX ADMIN — Medication 2 MILLI-UNITS/MIN: at 02:34

## 2022-07-13 RX ADMIN — ROPIVACAINE HYDROCHLORIDE 5 ML: 2 INJECTION, SOLUTION EPIDURAL; INFILTRATION; PERINEURAL at 09:03

## 2022-07-13 RX ADMIN — LIDOCAINE HYDROCHLORIDE 5 MG: 10 INJECTION, SOLUTION EPIDURAL; INFILTRATION; INTRACAUDAL; PERINEURAL at 09:48

## 2022-07-13 RX ADMIN — LIDOCAINE HYDROCHLORIDE AND EPINEPHRINE 3 ML: 15; 5 INJECTION, SOLUTION EPIDURAL at 04:40

## 2022-07-13 RX ADMIN — KETOROLAC TROMETHAMINE 30 MG: 30 INJECTION, SOLUTION INTRAMUSCULAR; INTRAVENOUS at 18:26

## 2022-07-13 RX ADMIN — ROPIVACAINE HYDROCHLORIDE: 2 INJECTION, SOLUTION EPIDURAL; INFILTRATION at 04:52

## 2022-07-13 RX ADMIN — ROPIVACAINE HYDROCHLORIDE 10 ML/HR: 2 INJECTION, SOLUTION EPIDURAL; INFILTRATION at 04:51

## 2022-07-13 RX ADMIN — ACETAMINOPHEN 650 MG: 325 TABLET, FILM COATED ORAL at 17:17

## 2022-07-13 RX ADMIN — SODIUM CHLORIDE, SODIUM LACTATE, POTASSIUM CHLORIDE, AND CALCIUM CHLORIDE 125 ML/HR: .6; .31; .03; .02 INJECTION, SOLUTION INTRAVENOUS at 07:45

## 2022-07-13 RX ADMIN — FENTANYL CITRATE 50 MCG: 50 INJECTION INTRAMUSCULAR; INTRAVENOUS at 10:36

## 2022-07-13 RX ADMIN — IBUPROFEN 600 MG: 600 TABLET, FILM COATED ORAL at 13:07

## 2022-07-13 NOTE — OB LABOR/OXYTOCIN SAFETY PROGRESS
Oxytocin Safety Progress Check Note - Yevgeniy Cooney 19 y o  female MRN: [id number]    Unit/Bed#: L&D 321-01 Encounter: [id number]    Dose (lowell-units/min) Oxytocin: 18 lowell-units/min  Contraction Frequency (minutes): 3-4  Contraction Quality: Moderate  Tachysystole: No   Cervical Dilation: 5-6        Cervical Effacement: 80  Fetal Station: -1  Baseline Rate: 130 bpm  Fetal Heart Rate: 135 BPM  FHR Category: Category I  Oxytocin Safety Progress Check: Safety check completed            Vital Signs:   Vitals:    07/13/22 0924   BP: 115/70   Pulse: 89   Resp:    Temp:        Notes/comments: Pain suddenly worse  Exam with some progress to 5 5 cm  Will notify anesthesia and request assistance  Baseline uterine tone appears relaxed between ctxn  Pain relieved slightly by repositioning  FHR cat 1  CTXN frequency q1 5-2 from prior frequency of q3-4  Will reduce pitocin to 16 and continue to monitor          Dalton Tom MD 7/13/2022 9:35 AM

## 2022-07-13 NOTE — ANESTHESIA POSTPROCEDURE EVALUATION
Post-Op Assessment Note    CV Status:  Stable    Pain management: adequate     Mental Status:  Alert and awake   Hydration Status:  Euvolemic   PONV Controlled:  Controlled   Airway Patency:  Patent      Post Op Vitals Reviewed: Yes      Staff: Anesthesiologist     Post-op block assessment: catheter intact and no complications      No complications documented      BP      Temp      Pulse     Resp      SpO2      /66   Pulse 81   Temp 98 3 °F (36 8 °C) (Oral)   Resp 16   Ht 5' 6" (1 676 m)   Wt 87 1 kg (192 lb)   LMP 10/11/2021   Breastfeeding Unknown   BMI 30 99 kg/m²

## 2022-07-13 NOTE — DISCHARGE SUMMARY
Discharge Summary - Yevgeniy Cooney 19 y o  female MRN: [id number]    Unit/Bed#: L&D 321-01 Encounter: [id number]    Admission Date: 2022     Discharge Date: 7/15/2022    Admitting Attending: Dr Aimee Doan  Delivering Attending: Dr Evelyn Coffman  Discharge Attending: Dr Nishant Franklin    Diagnosis:   Pregnancy at 39w1d  Migraine Headaches with aura  Bipolar depression    Procedures: Spontaneous Vaginal Delivery    Complications: none apparent    Hospital Course: Patient was admitted for elective induction of labor  She was managed expectantly for labor and received an epidural for pain control  Patient was started on low dose pitocin titration and was AROM  Pitocin was decreased after AROM because of increased frequency of contractions  She progressed to complete  She delivered via spontaneous vaginal delivery with no lacerations  She delivered a viable male weighing 8lbs 9 9oz  She had an uncomplicated postpartum course  Condition at discharge: good     On day of discharge, patient was tolerating PO, passing flatus, urinating, and ambulating  Her pain was well controlled with oral analgesics  She was discharged home on postpartum day #2 with standard post partum instructions to follow up with her physician in 3-6 weeks for a postpartum appointment  Discharge instructions/Information to patient and family:   - Do not place anything (no partner, tampons or douche) in your vagina for 6 weeks  -You may walk for exercise for the first 6 weeks then gradually return to your usual activities    -Please do not drive for 1 week if you have no stitches and for 2 weeks if you have stitches or underwent a  delivery     -You may take baths or shower per your preference    -Please look at your bust (breasts) in the mirror daily and call for redness or tenderness or increased warmth    -Please call us for temperature > 100 4*F or 38* C, worsening pain or a foul discharge        Discharge Medications:   Prenatal vitamin daily for 6 months or the duration of nursing whichever is longer  Motrin 600 mg orally every 6 hours as needed for pain  Tylenol (over the counter) per bottle directions as needed for pain: do NOT use with percocet  Hydrocortisone cream 1% (over the counter) applied 1-2x daily to hemorrhoids as needed    Provisions for Follow-Up Care: Follow up with your doctor in 3-6 weeks for postpartum care       Planned Readmission: No

## 2022-07-13 NOTE — ASSESSMENT & PLAN NOTE
Reports migraines that began a few weeks ago   Migraines accompanied by intermittent vision loss   Reports BP is elevated at the time of symptoms  CBC/CMP wnl, P:C 0 15  Patient instructed to report if headache occurs while inpatient

## 2022-07-13 NOTE — ANESTHESIA PROCEDURE NOTES
Epidural Block    Patient location during procedure: OB  Start time: 7/13/2022 4:40 AM  Reason for block: primary anesthetic  Staffing  Performed: Anesthesiologist   Anesthesiologist: Young Rhoades DO  Preanesthetic Checklist  Completed: patient identified, IV checked, site marked, risks and benefits discussed, surgical consent, monitors and equipment checked, pre-op evaluation and timeout performed  Epidural  Patient position: sitting  Prep: Betadine  Patient monitoring: heart rate, continuous pulse ox and frequent blood pressure checks  Approach: midline  Location: lumbar  Injection technique: DIMITRI saline  Needle  Needle type: Tuohy   Needle gauge: 18 G  Catheter type: side hole  Catheter size: 20 G  Catheter at skin depth: 10 cm  Catheter securement method: surgical tape  Test dose: negative  Assessment  Number of attempts: 1negative aspiration for CSF, negative aspiration for heme and no paresthesia on injection  patient tolerated the procedure well with no immediate complications

## 2022-07-13 NOTE — ASSESSMENT & PLAN NOTE
Here for Isidra WICK on arrival 1-2/60/-3  Plan to begin induction with dean balloon and cytotec   Clear liquid diet    cc/hr   Anesthesia at maternal request   Proven to 8lb 15 9oz

## 2022-07-13 NOTE — ANESTHESIA PREPROCEDURE EVALUATION
Procedure:  LABOR ANALGESIA    Relevant Problems   ANESTHESIA (within normal limits)      CARDIO (within normal limits)   (+) Migraine headache      GYN   (+) 39 weeks gestation of pregnancy      HEMATOLOGY   (+) Anemia during pregnancy in third trimester      NEURO/PSYCH   (+) Depression during pregnancy in third trimester   (+) Depressive disorder   (+) Migraine headache      PULMONARY (within normal limits)        Physical Exam    Airway    Mallampati score: II  TM Distance: >3 FB  Neck ROM: full     Dental   No notable dental hx     Cardiovascular  Rhythm: regular, Rate: normal, Cardiovascular exam normal    Pulmonary  Pulmonary exam normal Breath sounds clear to auscultation,     Other Findings        Anesthesia Plan  ASA Score- 2     Anesthesia Type- epidural with ASA Monitors  Additional Monitors:   Airway Plan:           Plan Factors-    Chart reviewed  Existing labs reviewed  Patient summary reviewed  Induction- intravenous  Postoperative Plan-     Informed Consent- Anesthetic plan and risks discussed with patient

## 2022-07-13 NOTE — OB LABOR/OXYTOCIN SAFETY PROGRESS
Oxytocin Safety Progress Check Note - Aidan Pitt 19 y o  female MRN: [id number]    Unit/Bed#: L&D 321-01 Encounter: [id number]    Dose (lowell-units/min) Oxytocin: 4 lowell-units/min  Contraction Frequency (minutes): 3-5  Contraction Quality: Moderate  Tachysystole: No   Cervical Dilation: 3-4        Cervical Effacement: 60  Fetal Station: -3  Baseline Rate: 130 bpm  Fetal Heart Rate: 135 BPM  FHR Category: Category I               Vital Signs:   Vitals:    07/13/22 0348   BP: 119/64   Pulse: 72   Resp:    Temp:        Notes/comments:   Two hours since pitocin titration started  FHT remains category I  Patient getting epidural at this time  Will defer check and plan for repeat SVE after comfortable       Aurora Ma MD 7/13/2022 4:41 AM

## 2022-07-13 NOTE — OB LABOR/OXYTOCIN SAFETY PROGRESS
Oxytocin Safety Progress Check Note - Zia Silva 19 y o  female MRN: [id number]    Unit/Bed#: L&D 321-01 Encounter: [id number]    Dose (lowell-units/min) Oxytocin: 8 lowell-units/min  Contraction Frequency (minutes): 3-5  Contraction Quality: Moderate  Tachysystole: No   Cervical Dilation: 3-4        Cervical Effacement: 70  Fetal Station: -2  Baseline Rate: 130 bpm  Fetal Heart Rate: 135 BPM  FHR Category: Category I               Vital Signs:   Vitals:    07/13/22 0446   BP: 122/78   Pulse: 78   Resp:    Temp:        Notes/comments:   Patient now comfortable with epidural  SVE at this time 4/70/-2  FHT remains category I  Will continue pitocin titration       Clifford Del Cid MD 7/13/2022 5:19 AM

## 2022-07-13 NOTE — H&P
H & P- Obstetrics   Fernanda Sood 19 y o  female MRN: [id number]  Unit/Bed#: L&D 321-01 Encounter: [id number]    Assessment: 19 y o  [id number] at 39w1d admitted for elective induction of labor  SVE: 1-2/60/-3  FHT: 150s  Clinical EFW: 95%tile on 7/6 ; Vertex confirmed by TAUS  GBS status: negative   Postpartum contraception plan: POPs    Plan:   Migraine headache  Assessment & Plan  Reports migraines that began a few weeks ago   Migraines accompanied by intermittent vision loss   Reports BP is elevated at the time of symptoms  Will get CBC/CMP and P:C on admission   Patient instructed to report if headache occurs while inpatient     Bipolar depression ([facility] [address] )  Assessment & Plan  Currently not on medication     * 39 weeks gestation of pregnancy  Assessment & Plan  Here for Isidra WICK on arrival 1-2/60/-3  Plan to begin induction with dean balloon and cytotec   Clear liquid diet    cc/hr   Anesthesia at maternal request   Proven to 8lb 15 9oz         Discussed case and plan w/ Dr Peyman Bacon      Chief Complaint:  I am here for my induction of labor    HPI: Fernanda Sood is a 19 y o  [id number] with an LUANNE of 7/18/2022, by Last Menstrual Period at 39w1d who is being admitted for elective induction of labor  She complains of uterine contractions, occurring every irregularly, has no LOF, and reports no VB  She states she has felt good FM  Patient states that she has had no medical problems during pregnancy however for the last few weeks developed migraines which are accompanied by loss of vision that is transient  Patient reports during these times she has taken her blood pressure has been in the 150s  Given this report will run CBC/CMP and PC ratio on arrival   Patient denies any headache at this time however reports that headaches throughout the last few weeks have been relieved by magnesium supplementation      Patient Active Problem List   Diagnosis    COVID-19 affecting pregnancy in first trimester    Bipolar depression ([facility] [address] )    Depressive disorder    Insomnia    39 weeks gestation of pregnancy    Depression during pregnancy in third trimester    Anemia during pregnancy in third trimester    Migraine headache    Prior fetal macrosomia in third trimester, antepartum    Vaginal discharge during pregnancy       Baby complications/comments: none    Review of Systems   Constitutional: Negative  HENT: Negative  Eyes: Positive for visual disturbance  Respiratory: Negative  Cardiovascular: Negative  Gastrointestinal: Negative  Endocrine: Negative  Genitourinary: Negative  Musculoskeletal: Negative  Skin: Negative  Allergic/Immunologic: Negative  Neurological: Positive for headaches  Psychiatric/Behavioral: Negative  OB Hx:  OB History    Para Term  AB Living   4 1 1   2 1   SAB IAB Ectopic Multiple Live Births   2       1      # Outcome Date GA Lbr Karri/2nd Weight Sex Delivery Anes PTL Lv   4 Current            3 Term 19 40w4d  4080 g (8 lb 15 9 oz) M Vag-Spont EPI  GEETHA   2 SAB 10/20/17 10w0d          1 SAB 01/10/16 4w0d              Past Medical Hx:  Past Medical History:   Diagnosis Date    Depression     History of transfusion     2014    Migraine headache 2022    Varicella     vaccine       Past Surgical hx:  Past Surgical History:   Procedure Laterality Date    SKIN GRAFT Left     left elbow    SKIN GRAFT      left elbow         Allergies   Allergen Reactions    Benadryl [Diphenhydramine] Hives       Medications Prior to Admission   Medication    Docusate Sodium (COLACE PO)    ferrous sulfate 324 (65 Fe) mg    Prenatal Vit-Fe Fumarate-FA (PRENATAL VITAMIN PO)    meclizine (ANTIVERT) 25 mg tablet       Objective:  Temp:  [97 7 °F (36 5 °C)] 97 7 °F (36 5 °C)  HR:  [113] 113  Resp:  [16] 16  BP: (118)/(62) 118/62  Body mass index is 30 99 kg/m²       Physical Exam:  Physical Exam  Constitutional:       General: She is not in acute distress  Appearance: Normal appearance  HENT:      Head: Normocephalic and atraumatic  Pulmonary:      Effort: Pulmonary effort is normal    Abdominal:      Palpations: Abdomen is soft  Tenderness: There is no abdominal tenderness  Comments: Gravid   Neurological:      General: No focal deficit present  Mental Status: She is alert     Psychiatric:         Mood and Affect: Mood normal             FHT:  Baseline: 150 bpm   Variability: Moderate   Accelerations: Present   Decelerations: none     TOCO:   Irritability w/ 1 contraction     Lab Results   Component Value Date    WBC 12 08 (H) 07/12/2022    HGB 9 9 (L) 07/12/2022    HCT 30 1 (L) 07/12/2022     07/12/2022     Lab Results   Component Value Date     04/08/2015    K 3 1 (L) 07/12/2022     07/12/2022    CO2 23 07/12/2022    BUN 10 07/12/2022    CREATININE 0 74 07/12/2022    GLUCOSE 88 04/08/2015    AST 14 07/12/2022    ALT 20 07/12/2022     Prenatal Labs: Reviewed      Blood type: A positive  Antibody: Negative  GBS: Negative  HIV: Non-Reactive  Rubella: Immune  VDRL/RPR: Non reactive  HBsAg: Negative  Chlamydia: Negative  Gonorrhea: Negative  Diabetes 1 hour screen: 92  Platelets: 300  Hgb: 12 5  >2 Midnights  INPATIENT     Signature/Title: Arcelia Suero MD  Date: 7/12/2022  Time: 9:54 PM

## 2022-07-13 NOTE — LACTATION NOTE
This note was copied from a baby's chart  CONSULT - LACTATION  Baby Boy Sharif Ingram) Alma Delia 0 days male MRN: [id number]    [address] [facility] NURSERY Room / Bed: L&D 308(N)/L&D 308(N) Encounter: [id number]    Maternal Information     MOTHER:  Annika Flannery  Maternal Age: 19 y o    OB History: # 1 - Date: [birth year], Sex: None, Weight: None, GA: 4w0d, Delivery: None, Apgar1: None, Apgar5: None, Living: None, Birth Comments: None    # 2 - Date: 10/20/17, Sex: None, Weight: None, GA: 10w0d, Delivery: None, Apgar1: None, Apgar5: None, Living: None, Birth Comments: None    # 3 - Date: 19, Sex: Male, Weight: 4080 g (8 lb 15 9 oz), GA: 40w4d, Delivery: Vaginal, Spontaneous, Apgar1: 8, Apgar5: 9, Living: Living, Birth Comments: None    # 4 - Date: 22, Sex: Male, Weight: 3910 g (8 lb 9 9 oz), GA: 39w2d, Delivery: Vaginal, Spontaneous, Apgar1: 8, Apgar5: 9, Living: Living, Birth Comments: None   Previouse breast reduction surgery? No    Lactation history:   Has patient previously breast fed: Yes   How long had patient previously breast fed: a few weeks   Previous breast feeding complications:  Other (Comment) (difficulty with latching)     Past Surgical History:   Procedure Laterality Date    SKIN GRAFT Left     left elbow    SKIN GRAFT      left elbow        Birth information:  YOB: 2024   Time of birth: 11:27 AM   Sex: male   Delivery type: Vaginal, Spontaneous   Birth Weight: 3910 g (8 lb 9 9 oz)   Percent of Weight Change: 0%     Gestational Age: 44w2d   [unfilled]    Assessment     Breast and nipple assessment: small wide spaced breasts     Assessment: normal assessment    Feeding assessment: feeding well with assistance    LATCH:  Latch: Repeated attempts, hold nipple in mouth, stimulate to suck   Audible Swallowing: A few with stimulation   Type of Nipple: Everted (After stimulation)   Comfort (Breast/Nipple): Soft/non-tender   Hold (Positioning): Partial assist, teach one side, mother does other, staff holds   [facility] Score: 7          Feeding recommendations:  breast feed on demand       Met with mother  Provided with Ready, Set, Baby booklet  Discussed Skin to Skin contact an benefits to mom and baby  Talked about the delay of the first bath until baby has adjusted  Spoke about the benefits of rooming in  Feeding on cue and what that means for recognizing infant's hunger  Avoidance of pacifiers for the first month discussed  Talked about exclusive breastfeeding for the first 6 months  Positioning and latch reviewed as well as showing images of other feeding positions  Discussed the properties of a good latch in any position  Helped with positioning/maintaining deep latch on left breast using football hold  Baby suckled well with support converting to rocker suckling  Mom noted good suckling and breast softening with feeding  Nipple round without distortion after feed  Mom has breast discomfrt and cramping at this time  Reviewed hand/manual expression  Discussed s/s that baby is getting enough milk and some s/s that breastfeeding dyad may need further help  Dad is supportive at bedside  Gave information on common concerns, what to expect the first few weeks after delivery, preparing for other caregivers, and how partners can help  Resources for support also provided  Also went  over discharge breastfeeding booklet including the feeding log  Emphasized 8 or more (12) feedings in a 24 hour period, what to expect for the number of diapers per day of life and the progression of properties of the  stooling pattern  Reviewed breastfeeding and your lifestyle, storage and preparation of breast milk, how to keep you breast pump clean, the employed breastfeeding mother and paced bottle feeding handouts  Booklet included Breastfeeding Resources for after discharge including access to the number for the [address]        Encouraged parents to call for assistance, questions, and concerns about breastfeeding  Extension provided            Paulino Ibrahim RN 7/13/2022 7:50 PM

## 2022-07-13 NOTE — PLAN OF CARE
Problem: POSTPARTUM  Goal: Experiences normal postpartum course  Description: INTERVENTIONS:  - Monitor maternal vital signs  - Assess uterine involution and lochia  Outcome: Progressing  Goal: Appropriate maternal -  bonding  Description: INTERVENTIONS:  - Identify family support  - Assess for appropriate maternal/infant bonding   -Encourage maternal/infant bonding opportunities  - Referral to  or  as needed  Outcome: Progressing  Goal: Establishment of infant feeding pattern  Description: INTERVENTIONS:  - Assess breast/bottle feeding  - Refer to lactation as needed  Outcome: Progressing

## 2022-07-13 NOTE — L&D DELIVERY NOTE
DELIVERY NOTE  Ant Whaley 19 y o  female MRN: [id number]  Unit/Bed#: L&D 321-01 Encounter: [id number]    Obstetrician:   Dr Demetris Cook    Assistant: No qualified resident available  Pre-Delivery Diagnosis: Term pregnancy  Single fetus    Post-Delivery Diagnosis: Same as above - Delivered    Procedure: Spontaneous vaginal delivery    Delivery Date and Time:  22 at 13:64    Umbilical Artery  Recent Labs     22  1128   PHCART 7 249   BECART -3 8*       Umbilical Vein  Recent Labs     22  1128   PHCVEN 7 434   BECVEN -2 6*       Apgars: 8 and 9 and 1 and 5 minutes    Weight: pending      Quantified blood loss (mL): 649           Complications:  None    Brief description of labor:  Please see additional notes for details of the labor course  Pt progressed to complete dilation over 2 hrs after AROM at 5 cm at approx 9am  FHR was cat 1  Epidural provided moderate relief between ctxn  Ctxn were very painful  Pitocin was gradually decreased after AROM, as ctxn frequency was high and spontaneous progress was occurring  At the time of pushing, pitocin had been decreased from 18 to 10 miliunits/min  Description of Procedure:  Preparations were made for shoulder dystocia given anticipated larger baby  With maternal expulsive efforts, the patient delivered a viable male   The position at delivery was AMANDA  The fetal vertex delivered spontaneously  There was one loose, reduced nuchal cord  The anterior shoulder delivered atraumatically with maternal expulsive forces and the assistance of gentle downward traction  The posterior shoulder delivered with maternal expulsive forces and the assistance of gentle upward traction  The remainder of the fetus delivered spontaneously  Upon delivery, the  was placed on the mother's abdomen and the umbilical cord was clamped and cut  The  resuscitator evaluated the  at bedside   Arterial and venous cord blood gases and cord blood were collected for analysis  These were sent to the lab  Active management of the third stage of labor included uterine massage and administration of IV Pitocin at 250 angela-units per minute  The uterus was noted to contract down well  The placenta delivered spontaneously and was noted to have a centrally-inserted 3-vessel cord  It was sent for storage  The vagina, cervix, perineum, were inspected and there was noted to be no laceration  At the conclusion of the delivery, all sponge, and instrument counts were noted to be correct  The patient tolerated the procedure well and was allowed to recover in labor and delivery room  I, Dr Vanessa Rudolph MD was present for the entire procedure

## 2022-07-13 NOTE — OB LABOR/OXYTOCIN SAFETY PROGRESS
Oxytocin Safety Progress Check Note - Pepe Mancera 19 y o  female MRN: [id number]    Unit/Bed#: L&D 321-01 Encounter: [id number]    Dose (lowell-units/min) Oxytocin: 16 lowell-units/min  Contraction Frequency (minutes): 4  Contraction Quality: Moderate  Tachysystole: No   Cervical Dilation: 4        Cervical Effacement: 80  Fetal Station: -1  Baseline Rate: 130 bpm  Fetal Heart Rate: 135 BPM  FHR Category: Category I  Oxytocin Safety Progress Check: Safety check completed            Vital Signs:   Vitals:    07/13/22 0753   BP: 123/60   Pulse: 71   Resp:    Temp:        Notes/comments:  Pt comfortable with epidural  I introduced myself and my role as attending physician, laborist, and member Annika's care team today  I explained that I am the in-hospital physician covering for Dr Lynda Bean at this time  Fabien Melton understood and gave me verbal permission to care for her  I discussed the risk of shoulder dystocia, sequelae, and management  I discussed AROM at this time and she agreed  AROM performed with clear fluid noted  Will continue oxytocin titration for induction of labor          Sarah Simon MD 7/13/2022 9:00 AM

## 2022-07-14 PROCEDURE — 99024 POSTOP FOLLOW-UP VISIT: CPT | Performed by: OBSTETRICS & GYNECOLOGY

## 2022-07-14 RX ORDER — DOCUSATE SODIUM 100 MG/1
100 CAPSULE, LIQUID FILLED ORAL 2 TIMES DAILY PRN
Qty: 60 CAPSULE | Refills: 0 | Status: SHIPPED | OUTPATIENT
Start: 2022-07-14

## 2022-07-14 RX ORDER — IBUPROFEN 600 MG/1
600 TABLET ORAL EVERY 6 HOURS PRN
Qty: 60 TABLET | Refills: 0 | Status: SHIPPED | OUTPATIENT
Start: 2022-07-14 | End: 2022-07-15 | Stop reason: SDUPTHER

## 2022-07-14 RX ADMIN — ACETAMINOPHEN 650 MG: 325 TABLET, FILM COATED ORAL at 06:28

## 2022-07-14 RX ADMIN — IBUPROFEN 600 MG: 600 TABLET, FILM COATED ORAL at 18:20

## 2022-07-14 RX ADMIN — IBUPROFEN 600 MG: 600 TABLET, FILM COATED ORAL at 00:57

## 2022-07-14 RX ADMIN — IBUPROFEN 600 MG: 600 TABLET, FILM COATED ORAL at 12:39

## 2022-07-14 NOTE — UTILIZATION REVIEW
Inpatient Admission Authorization Request   Notification of Maternity/Delivery &  Birth Information for Admission   SERVICING FACILITY:   [address] [place] [address], [facility], [facility] [address] [facility]  Tax ID: [id number]  NPI: [id number]  Place of Service: Inpatient [address]  Place of Service Code: 24     ATTENDING PROVIDER:  Attending Name and NPI#: Mary De La Cruz, Belen Madhuas Braxton [[id number]]  Address: [address] [place] [address], [place] [address] [address] [facility]  Phone: [phone]     UTILIZATION REVIEW CONTACT:  Rustam Parada Utilization   Network Utilization Review Department  Phone: [phone]  Fax [phone]  Email: [email or web address]     PHYSICIAN ADVISORY SERVICES:  FOR APAO-WI-NBII REVIEW - MEDICAL NECESSITY DENIAL  Phone: [phone]  Fax: [phone]  Email: [email or web address]     TYPE OF REQUEST:  Inpatient Status     ADMISSION INFORMATION:  ADMISSION DATE/TIME: 22  9:16 PM  PATIENT DIAGNOSIS CODE/DESCRIPTION:  Encounter for full-term uncomplicated delivery [C61] The primary encounter diagnosis was  (spontaneous vaginal delivery)  A diagnosis of 39 weeks gestation of pregnancy was also pertinent to this visit  1   (spontaneous vaginal delivery)    2  39 weeks gestation of pregnancy      DISCHARGE DATE/TIME: No discharge date for patient encounter  MOTHER AND  INFORMATION:  Mother: Juan Luis Saravia 2000   Delivering clinician: [facility]   OB History        4    Para   2    Term   2            AB   2    Living   2       SAB   2    IAB        Ectopic        Multiple   0    Live Births   2               Radiant Name & MRN:   Information for the patient's : Berlinda Castleman Florala) [[id number]]      Delivery Information:  Sex: male  Delivered 2022 11:27 AM by Vaginal, Spontaneous;  Gestational Age: 44w2d    Radiant Measurements:  Weight: 8 lb 9 9 oz (3910 g); Height: 20"    APGAR 1 minute 5 minutes 10 minutes   Totals: 8 9      [place] Birth Information: 19 y o  female MRN: [id number] Unit/Bed#: L&D 308-01 Estimated Date of Delivery: 22  Birthweight: No birth weight on file  Gestational Age: <None> Delivery Type: Vaginal, Spontaneous      IMPORTANT INFORMATION:  Please contact Fernanda Jerome directly with any questions or concerns regarding this request  Department voicemails are confidential     Send requests for admission clinical reviews, concurrent reviews, approvals, and administrative denials due to lack of clinical to fax [phone]

## 2022-07-14 NOTE — ASSESSMENT & PLAN NOTE
Lochia WNL   Recovering well   Appropriate bowel and bladder function   Pain well controlled   Tolerating diet   Breastfeeding: yes  Ambulating without issues   No lower extremity tenderness  GBS neg  Rh:A pos  Contraceptions: POPs

## 2022-07-14 NOTE — LACTATION NOTE
This note was copied from a baby's chart  Mom wanted assistance with positioning and deep latch  Worked on positioning infant up at chest level and starting to feed infant with nose arriving at the nipple  Then, using areolar compression to achieve a deep latch that is comfortable and exchanges optimum amounts of milk  Baby latched quickly on left breast using football hold  Mom with continued discomfort after latch on  Nipple appears rounded without distortion  Mom wanted to try nipple shield after a few minutes  Asked Mom to take deep breath and focus on baby and positive thought when baby converts to rocker suckling for piston latch sucks  Mom seemed to tolerate fairly with shield and distraction  Mom's plan is to pump and use Donor milk then use shield  To try nursing at breast every other feed to see how it goes for her  Mom does note visually good suckling but does not feel much change in discomfrt  Dad is supportive at bedside  Encouraged parents to call for assistance, questions, and concerns about breastfeeding  Extension provided

## 2022-07-14 NOTE — PROGRESS NOTES
Progress Note - OB/GYN  Sravani Kirkland 19 y o  female MRN: [id number]  Unit/Bed#: L&D 976-44 Encounter: [id number]    Assessment and Plan     Sravani Kirkland is a patient of: [facility]  She is PPD# 1 s/p  spontaneous vaginal delivery  Recovering well and is stable       *  (spontaneous vaginal delivery)  Assessment & Plan  Lochia WNL   Recovering well   Appropriate bowel and bladder function   Pain well controlled   Tolerating diet   Breastfeeding: yes  Ambulating without issues   No lower extremity tenderness  GBS neg  Rh:A pos  Contraceptions: POPs    Migraine headache  Assessment & Plan  Reports migraines that began a few weeks ago   Migraines accompanied by intermittent vision loss   Reports BP is elevated at the time of symptoms  CBC/CMP wnl, P:C 0 15  Patient instructed to report if headache occurs while inpatient     Bipolar depression [facility])  Assessment & Plan  Currently not on medication       Disposition    - Anticipate discharge home on PPD# 2      Subjective/Objective     Chief Complaint: Postpartum State     Subjective:    Sravani Kirkland is PPD#1 s/p  spontaneous vaginal delivery  She has no current complaints  Pain is well controlled  Patient is currently voiding  She is ambulating  Patient is currently passing flatus and has had no bowel movement  She is tolerating PO, and denies nausea or vomitting  Patient denies fever, chills, chest pain, shortness of breath, or calf tenderness  Lochia is normal  She is  Breastfeeding  She is recovering well and is stable         Vitals:   /53 (BP Location: Right arm)   Pulse 63   Temp 97 9 °F (36 6 °C) (Oral)   Resp 16   Ht 5' 6" (1 676 m)   Wt 87 1 kg (192 lb)   LMP 10/11/2021   Breastfeeding Yes   BMI 30 99 kg/m²       Intake/Output Summary (Last 24 hours) at 2022 0609  Last data filed at 2022 1800  Gross per 24 hour   Intake --   Output 1733 ml   Net -1733 ml       Invasive Devices  Timeline    Peripheral Intravenous Line Duration           Peripheral IV 07/12/22 Left;Ventral (anterior) Forearm 1 day          Drain  Duration           Urethral Catheter Double-lumen 16 Fr  <1 day                Physical Exam:   GEN: Pepe Mancera appears well, alert and oriented x 3, pleasant and cooperative   CARDIO: RRR  RESP:  Normal respiratory effort  ABDOMEN: soft, no tenderness, no distention, fundus @ umbilicus  EXTREMITIES: non tender, no erythema      Labs:     Hemoglobin   Date Value Ref Range Status   07/12/2022 9 9 (L) 11 5 - 15 4 g/dL Final   07/06/2022 10 4 (L) 11 5 - 15 4 g/dL Final   04/08/2015 13 0 11 5 - 15 4 g/dL Final     WBC   Date Value Ref Range Status   07/12/2022 12 08 (H) 4 31 - 10 16 Thousand/uL Final   07/06/2022 13 42 (H) 4 31 - 10 16 Thousand/uL Final   04/08/2015 8 71 4 31 - 10 16 Thousand/uL Final     Platelets   Date Value Ref Range Status   07/12/2022 194 149 - 390 Thousands/uL Final   07/06/2022 234 149 - 390 Thousands/uL Final   04/08/2015 330 149 - 390 Thousand/uL Final     Creatinine   Date Value Ref Range Status   07/12/2022 0 74 0 60 - 1 30 mg/dL Final     Comment:     Standardized to IDMS reference method   07/06/2022 0 59 (L) 0 60 - 1 30 mg/dL Final     Comment:     Standardized to IDMS reference method   04/08/2015 0 50 (L) 0 60 - 1 30 mg/dL Final     Comment:     Standardized to IDMS reference method     AST   Date Value Ref Range Status   07/12/2022 14 5 - 45 U/L Final     Comment:     Specimen collection should occur prior to Sulfasalazine administration due to the potential for falsely depressed results  07/06/2022 19 5 - 45 U/L Final     Comment:     Specimen collection should occur prior to Sulfasalazine administration due to the potential for falsely depressed results      04/08/2015 17 8 - 69 U/L Final     ALT   Date Value Ref Range Status   07/12/2022 20 12 - 78 U/L Final     Comment:     Specimen collection should occur prior to Sulfasalazine administration due to the potential for falsely depressed results  07/06/2022 13 12 - 78 U/L Final     Comment:     Specimen collection should occur prior to Sulfasalazine and/or Sulfapyridine administration due to the potential for falsely depressed results      04/08/2015 22 14 - 59 U/L Final          Bj Maciel MD   PGY1 OBGYN  7/14/2022  6:09 AM

## 2022-07-15 VITALS
HEART RATE: 64 BPM | DIASTOLIC BLOOD PRESSURE: 68 MMHG | RESPIRATION RATE: 18 BRPM | TEMPERATURE: 98.2 F | OXYGEN SATURATION: 97 % | WEIGHT: 192 LBS | BODY MASS INDEX: 30.86 KG/M2 | SYSTOLIC BLOOD PRESSURE: 115 MMHG | HEIGHT: 66 IN

## 2022-07-15 PROBLEM — Z91.89 AT RISK FOR POSTPARTUM DEPRESSION: Status: ACTIVE | Noted: 2022-07-15

## 2022-07-15 PROCEDURE — 99024 POSTOP FOLLOW-UP VISIT: CPT | Performed by: OBSTETRICS & GYNECOLOGY

## 2022-07-15 PROCEDURE — 99252 IP/OBS CONSLTJ NEW/EST SF 35: CPT | Performed by: PSYCHIATRY & NEUROLOGY

## 2022-07-15 RX ORDER — ACETAMINOPHEN AND CODEINE PHOSPHATE 120; 12 MG/5ML; MG/5ML
1 SOLUTION ORAL DAILY
Qty: 28 TABLET | Refills: 2 | Status: SHIPPED | OUTPATIENT
Start: 2022-07-15

## 2022-07-15 RX ORDER — ACETAMINOPHEN 500 MG
500 TABLET ORAL EVERY 6 HOURS PRN
Refills: 0
Start: 2022-07-15

## 2022-07-15 RX ORDER — IBUPROFEN 600 MG/1
600 TABLET ORAL EVERY 6 HOURS PRN
Qty: 60 TABLET | Refills: 1 | Status: SHIPPED | OUTPATIENT
Start: 2022-07-15

## 2022-07-15 RX ADMIN — DOCUSATE SODIUM 100 MG: 100 CAPSULE, LIQUID FILLED ORAL at 08:03

## 2022-07-15 RX ADMIN — IBUPROFEN 600 MG: 600 TABLET, FILM COATED ORAL at 00:22

## 2022-07-15 RX ADMIN — IBUPROFEN 600 MG: 600 TABLET, FILM COATED ORAL at 06:08

## 2022-07-15 NOTE — PROGRESS NOTES
Progress Note - OB/GYN  Dilma Johnson 19 y o  female MRN: [id number]  Unit/Bed#: L&D 295-13 Encounter: [id number]    Assessment and Plan     Dilma Johnson is a patient of: [facility]  She is PPD# 2 s/p  spontaneous vaginal delivery  Recovering well and is stable       *  (spontaneous vaginal delivery)  Assessment & Plan  Lochia WNL   Recovering well   Appropriate bowel and bladder function   Pain well controlled   Tolerating diet   Breastfeeding: yes  Ambulating without issues   No lower extremity tenderness  GBS neg  Rh:A pos  Contraceptions: POPs    Migraine headache  Assessment & Plan  Reports migraines that began a few weeks ago   Migraines accompanied by intermittent vision loss   Reports BP is elevated at the time of symptoms  CBC/CMP wnl, P:C 0 15  Patient instructed to report if headache occurs while inpatient     Bipolar depression [facility])  Assessment & Plan  Currently not on medication         Disposition    - Anticipate discharge home on PPD# 2      Subjective/Objective     Chief Complaint: Postpartum State     Subjective:    Dilma Johnson is PPD#2 s/p  spontaneous vaginal delivery  She has no current complaints  Pain is well controlled  Patient is currently voiding  She is ambulating  Patient is currently passing flatus and has had no bowel movement  She is tolerating PO, and denies nausea or vomitting  Patient denies fever, chills, chest pain, shortness of breath, or calf tenderness  Lochia is normal  She is  Breastfeeding  She is recovering well and is stable         Vitals:   /65 (BP Location: Right arm)   Pulse 82   Temp 98 8 °F (37 1 °C) (Oral)   Resp 18   Ht 5' 6" (1 676 m)   Wt 87 1 kg (192 lb)   LMP 10/11/2021   SpO2 97%   Breastfeeding Yes   BMI 30 99 kg/m²     No intake or output data in the 24 hours ending 07/15/22 0604    Invasive Devices  Timeline    Peripheral Intravenous Line  Duration           Peripheral IV 22 Left;Ventral (anterior) Forearm 2 days Physical Exam:   GEN: Ki Theodore appears well, alert and oriented x 3, pleasant and cooperative   CARDIO: RRR  RESP:  Normal respiratory effort  ABDOMEN: soft, no tenderness, no distention, fundus @ umbilicus  EXTREMITIES:  non tender, no erythema      Labs:     Hemoglobin   Date Value Ref Range Status   07/12/2022 9 9 (L) 11 5 - 15 4 g/dL Final   07/06/2022 10 4 (L) 11 5 - 15 4 g/dL Final   04/08/2015 13 0 11 5 - 15 4 g/dL Final     WBC   Date Value Ref Range Status   07/12/2022 12 08 (H) 4 31 - 10 16 Thousand/uL Final   07/06/2022 13 42 (H) 4 31 - 10 16 Thousand/uL Final   04/08/2015 8 71 4 31 - 10 16 Thousand/uL Final     Platelets   Date Value Ref Range Status   07/12/2022 194 149 - 390 Thousands/uL Final   07/06/2022 234 149 - 390 Thousands/uL Final   04/08/2015 330 149 - 390 Thousand/uL Final     Creatinine   Date Value Ref Range Status   07/12/2022 0 74 0 60 - 1 30 mg/dL Final     Comment:     Standardized to IDMS reference method   07/06/2022 0 59 (L) 0 60 - 1 30 mg/dL Final     Comment:     Standardized to IDMS reference method   04/08/2015 0 50 (L) 0 60 - 1 30 mg/dL Final     Comment:     Standardized to IDMS reference method     AST   Date Value Ref Range Status   07/12/2022 14 5 - 45 U/L Final     Comment:     Specimen collection should occur prior to Sulfasalazine administration due to the potential for falsely depressed results  07/06/2022 19 5 - 45 U/L Final     Comment:     Specimen collection should occur prior to Sulfasalazine administration due to the potential for falsely depressed results  04/08/2015 17 8 - 69 U/L Final     ALT   Date Value Ref Range Status   07/12/2022 20 12 - 78 U/L Final     Comment:     Specimen collection should occur prior to Sulfasalazine administration due to the potential for falsely depressed results      07/06/2022 13 12 - 78 U/L Final     Comment:     Specimen collection should occur prior to Sulfasalazine and/or Sulfapyridine administration due to the potential for falsely depressed results      04/08/2015 22 14 - 59 U/L Final          Justin Florentino MD   PGY1 OBGYN  7/15/2022  6:04 AM

## 2022-07-15 NOTE — PLAN OF CARE
Phone message left ER contact Ambika Seymour, after reading progress noted she is a resident at [facility] , spoke with nurse Yash Can await return call with fax #,to call PAT dept at [phone]  for history review and surgery instructions. Problem: POSTPARTUM  Goal: Experiences normal postpartum course  Description: INTERVENTIONS:  - Monitor maternal vital signs  - Assess uterine involution and lochia  Outcome: Progressing  Goal: Appropriate maternal -  bonding  Description: INTERVENTIONS:  - Identify family support  - Assess for appropriate maternal/infant bonding   -Encourage maternal/infant bonding opportunities  - Referral to  or  as needed  Outcome: Progressing  Goal: Establishment of infant feeding pattern  Description: INTERVENTIONS:  - Assess breast/bottle feeding  - Refer to lactation as needed  Outcome: Progressing

## 2022-07-15 NOTE — QUICK NOTE
Discussed with patient her elevated EPDS score  She says she has been having thoughts of self-harm but doesn't think she'd actually do anything  She is amenable to seeing psychiatry before she leaves to set up good follow-up care  Consult to psychiatry placed  Case management will also see patient this morning      Keyon Valadez MD  PGY1 OBGYN  07/15/22  9:21 AM

## 2022-07-15 NOTE — CASE MANAGEMENT
Case Management Assessment    Patient name Yevgeniy Cooney  Location L&D 308/L&D 726-18 MRN [id number]  : 2000 Date 7/15/2022       Current Admission Date: 2022  Current Admission Diagnosis: (spontaneous vaginal delivery)   Patient Active Problem List    Diagnosis Date Noted    At risk for postpartum depression 07/15/2022     (spontaneous vaginal delivery) 2022    Vaginal discharge during pregnancy 2022    Prior fetal macrosomia in third trimester, antepartum 2022    Migraine headache 2022    Anemia during pregnancy in third trimester 2022    39 weeks gestation of pregnancy 2022    Depression during pregnancy in third trimester 2022    COVID-19 affecting pregnancy in first trimester 2022    B[facility] 2019    Depressive disorder 2017    Insomnia 2017      LOS (days): 3  Geometric Mean LOS (GMLOS) (days):   Days to GMLOS:     OBJECTIVE:    Risk of Unplanned Readmission Score: 5 36         Current admission status: Inpatient       Preferred Pharmacy:   \[facility]\"" [address] [facility] [address] ,  [facility] [address] [facility] [address]  Phone: [phone] Fax: [phone] [address]  [facility] [address]  Phone: [phone] Fax: [phone]   [facility] [address] [facility],[address] [address]  Phone: [phone] Fax: [phone]    Primary Care Provider: Sarath Chen DO    Primary Insurance: CAPITAL  Secondary Insurance: [employer]    ASSESSMENT:  Active Health Care Proxies    There are no active Health Care Proxies on file  CM consulted for PPD score of 17  On screening, MOB mentioned thoughts of self-harm  CM requested psych consult from [address] [place] [address] seen and cleared by [address] [place] [address] Psychiatry via video chat  After psych eval, CM met w/ MOB at bedside who provided the following information:    1  Current mental health diagnosis: MOB reports she was diagnosed w/ BPD following her first pregnancy but believes she was actually struggling w/ PPD  No other diagnosis reported  2  Currently receiving mental health treatment: Yes - therapist Vitaliy Ivan, has seen 2x so far  Was scheduled to see on 07/13/22 however MOB delivered on that day  3  Currently taking any medication: No  Hx of Zoloft but reports she stopped as it was not helpful  CM encouraged MOB to discuss w/ OBGYN if she would like to restart medication after dc   a  If so, who is the prescribing provider: N/A  4  History of PPD: with first child  5  OBGYN: Rosalind Sevilla - CM encouraged MOB to continue to discuss PPD concerns w/ her OBGYN and her therapist through OBGYN  6  Support system: FOB, children  7  Interested in resources: Yes - provided PPD packet, [facility] number (if needed), and PPD resources via [employer]  MOB stated she did not have the phone number for her therapist Vitaliy Ivan and asked if CM could assist w/ locating this number so MOB could schedule her next appt  CM called [address] [facility] [address], who stated that the therapist Vitaliy Ivan schedules through [employer] ([phone])  CM provided number to MOB  No other needs identified at this time  CM encouraged MOB to reach out with any further questions or concerns

## 2022-07-15 NOTE — PROGRESS NOTES
D/c teaching and "save your life" magnet was given and explained to pt  Pt asked appropriate questions and validated understanding of teaching

## 2022-07-15 NOTE — LACTATION NOTE
This note was copied from a baby's chart  breast milk starting to flow with hand expression  C/O painful latches continued   Reassurance with latching and redirction for alignment provided at Annika's request  Follow up request sent to front office staff at [address] at [facility] S[facility] CHERISE request

## 2022-07-15 NOTE — TELEMEDICINE
TeleConsultation - [facility] 19 y o  female MRN: [id number]  Unit/Bed#: L&D 308-01 Encounter: [id number]        REQUIRED DOCUMENTATION:     1  This service was provided via Telemedicine  2  Provider located at [place]  3  TeleMed provider: Joselyn Rouse MD   4  Identify all parties in room with patient during tele consult:  Patient  5  Patient was then informed that this was a Telemedicine visit and that the exam was being conducted confidentially over secure lines  My office door was closed  No one else was in the room  Patient acknowledged consent and understanding of privacy and security of the Telemedicine visit, and gave us permission to have the assistant stay in the room in order to assist with the history and to conduct the exam   I informed the patient that I have reviewed their record in Epic and presented the opportunity for them to ask any questions regarding the visit today  The patient agreed to participate  Assessment/Plan     Assessment:  Adjustment disorder with mixed emotional features; rule out bipolar disorder; rule out postpartum depression    Plan:   Risks, benefits and possible side effects of Medications:   Risks, benefits, and possible side effects of medications explained to patient and patient verbalizes understanding  The patient plans to follow-up closely with therapist in her OBGYN group where she will be closely monitored  She has on his initiated therapy with this therapist and feels very comfortable with this arrangement  This appears to be quite appropriate  At this time psychotropic medication does not appear to be indicated  Chief Complaint: We just have a lot of stress going on in her family recently    History of Present Illness     Reason for Consult / Principal Problem:  Assess for depression    Patient is a 19 y o  female presented to labor delivery for delivery of her 2nd child which was by uncomplicated delivery  Psychiatry was consulted to evaluate for possible depression  She had an elevated EPDS  The patient denies feeling depressed but admits that she has been frustrations full home times because of tests of some family members since the 1st of the year and moved that she and her  have been going through  Some courses in addition to the delivery of her 2nd child  She has a 1year-old child at home as well  The patient is that she has a history of cutting and head banging behaviors in the past   She states he has never had any associated suicidal intent but were a means of reducing her stress level  She states the last self-harm behavior she engaged in was cutting and this was more than year ago  She states since then she has worked in therapy developed more appropriate coping skills which have been successful  Patient states that sometimes when she is extremely frustrated she has a very mild urges to cut herself again but nothing comes close to acting on them  She states she never has any suicidal ideation  Past psychiatric history:  With postpartum after delivering her 1st child she states she saw psychiatrist diagnosed with with bipolar disorder but the patient does not feel is an accurate diagnosis  She believes she was experiencing some postpartum depression time but has been fine since that time  She has not been on medication  Social history: The patient is   In addition to her child just living she has 1year-old son home  She works at [employer] and states she loves her job  She reports no history of abuse  She states everything is fine at home  Family history: Mother has bipolar disorder schizophrenia per patient  Sister has depression  Brother had Asperger syndrome as a child  Substance use history:  No reported substance use    Mental status examination:  The patient is alert well oriented all spheres  Is affect is pleasant but somewhat subdued    She made good eye contact is very cooperative with the evaluation  She did quite with interaction  Speech was unremarkable  Sensorium is clear  Thought process was logical linear  Thought content was reality based  Associations were tight  Memory was grossly intact in all spheres  She appears to be of average intelligence by her use vocabulary, general fund of knowledge, sentence structure and syntax  She denies suicidal homicidal ideation  She denies any self-harm ideation this time  Insight and judgment are intact  She is highly motivated to follow up with her therapist through her OBGYN group upon discharge and continue to practice reclaiming her coping skills for dealing with life stressors  Inpatient consult to Psychiatry  Consult performed by: Salvador Al MD  Consult ordered by: Naida Thakkar MD              Past Medical History:   Diagnosis Date    Depression 2011    History of transfusion     2014    Migraine headache 5/24/2022    Varicella     vaccine       Medical Review Of Systems:  Review of Systems    Meds/Allergies   all current active meds have been reviewed  Allergies   Allergen Reactions    Benadryl [Diphenhydramine] Hives       Objective   Vital signs in last 24 hours:  Temp:  [97 9 °F (36 6 °C)-98 8 °F (37 1 °C)] 98 2 °F (36 8 °C)  HR:  [64-91] 64  Resp:  [18] 18  BP: (111-125)/(62-68) 115/68    No intake or output data in the 24 hours ending 07/15/22 1157      Lab Results:  Reviewed  Imaging Studies:  Reviewed  EKG, Pathology, and Other Studies:  Reviewed    Code Status: Level 1 - Full Code  Advance Directive and Living Will:      Power of :    POLST:      Counseling / Coordination of Care  Total floor / unit time spent today 30 minutes  Greater than 50% of total time was spent with the patient and / or family counseling and / or coordination of care   A description of the counseling / coordination of care:  Chart review, patient evaluation, coordination and communication with staff, nursing and provider

## 2022-07-15 NOTE — PLAN OF CARE
Problem: POSTPARTUM  Goal: Experiences normal postpartum course  Description: INTERVENTIONS:  - Monitor maternal vital signs  - Assess uterine involution and lochia  Outcome: Completed  Goal: Appropriate maternal -  bonding  Description: INTERVENTIONS:  - Identify family support  - Assess for appropriate maternal/infant bonding   -Encourage maternal/infant bonding opportunities  - Referral to  or  as needed  Outcome: Completed  Goal: Establishment of infant feeding pattern  Description: INTERVENTIONS:  - Assess breast/bottle feeding  - Refer to lactation as needed  Outcome: Completed

## 2022-07-15 NOTE — LACTATION NOTE
This note was copied from a baby's chart  CONSULT - LACTATION  Baby Boy Stephanie) Alma Delia 2 days male MRN: [id number]    [facility] NURSERY Room / Bed: L&D 308(N)/L&D 308(N) Encounter: [id number]    Maternal Information     MOTHER:  Annika Flannery  Maternal Age: 19 y o    OB History: # 1 - Date: [birth year], Sex: None, Weight: None, GA: 4w0d, Delivery: None, Apgar1: None, Apgar5: None, Living: None, Birth Comments: None    # 2 - Date: 10/20/17, Sex: None, Weight: None, GA: 10w0d, Delivery: None, Apgar1: None, Apgar5: None, Living: None, Birth Comments: None    # 3 - Date: 19, Sex: Male, Weight: 4080 g (8 lb 15 9 oz), GA: 40w4d, Delivery: Vaginal, Spontaneous, Apgar1: 8, Apgar5: 9, Living: Living, Birth Comments: None    # 4 - Date: 22, Sex: Male, Weight: 3910 g (8 lb 9 9 oz), GA: 39w2d, Delivery: Vaginal, Spontaneous, Apgar1: 8, Apgar5: 9, Living: Living, Birth Comments: None   Previouse breast reduction surgery? No    Lactation history:   Has patient previously breast fed: Yes   How long had patient previously breast fed: a few weeks   Previous breast feeding complications: Other (Comment) (difficulty with latching)     Past Surgical History:   Procedure Laterality Date    SKIN GRAFT Left     left elbow    SKIN GRAFT      left elbow        Birth information:  YOB: 2024   Time of birth: 11:27 AM   Sex: male   Delivery type: Vaginal, Spontaneous   Birth Weight: 3910 g (8 lb 9 9 oz)   Percent of Weight Change: -7%     Gestational Age: 44w2d   [unfilled]    Assessment     Breast and nipple assessment: sore nipple and Annika c/o extreme nipple tenderness  No compromised skin tissue noted      [place] Assessment: normal assessment    Feeding assessment: feeding well  LATCH:  Latch: Grasps breast, tongue down, lips flanged, rhythmic sucking   Audible Swallowing: Spontaneous and intermittent (24 hours old)   Type of Nipple: Everted (After stimulation)   Comfort (Breast/Nipple): Filling, red/small blisters/bruises, mild/moderate discomfort   Hold (Positioning): Partial assist, teach one side, mother does other, staff holds   [facility] Score: 8          Feeding recommendations:  breast feed on demand     Melanie Johns has tried nipple shields with dissatisfaction of what relief with breastfeeding she was anticipating  She attempted using her [place] Stride breast pump instead of feeding at the breast  DBM was used per parents' choice  Today, demonstrated closer holding instead of using pillows for support at he breast Demonstrated alignment and asymmetrical latch expected for increased comfort and transfer of breast milk  Hand expression revealed multiple drops of colostrum  Clint Santana remained at the breast for on the left side in cross cradle hold for 20-25 minutes  Information on hand expression given  Discussed benefits of knowing how to manually express breast including stimulating milk supply, softening nipple for latch and evacuating breast in the event of engorgement  Met with mother to go over discharge breastfeeding booklet including the feeding log  Emphasized 8 or more (12) feedings in a 24 hour period, what to expect for the number of diapers per day of life and the progression of properties of the  stooling pattern  Reviewed breastfeeding and your lifestyle, storage and preparation of breast milk, how to keep you breast pump clean, the employed breastfeeding mother and paced bottle feeding handouts  Booklet included Breastfeeding Resources for after discharge including access to the number for the [address]  Discussed s/s engorgement, blocked milk ducts, and mastitis  Discussed how to remedy at home and when to contact physician  Encouraged parents to call for assistance, questions, and concerns about breastfeeding  Extension provided        Sigifredo Jamil RN 7/15/2022 1:35 PM

## 2022-07-21 LAB — PLACENTA IN STORAGE: NORMAL

## 2022-07-28 ENCOUNTER — OFFICE VISIT (OUTPATIENT)
Dept: POSTPARTUM | Facility: CLINIC | Age: 22
End: 2022-07-28
Payer: COMMERCIAL

## 2022-07-28 VITALS — DIASTOLIC BLOOD PRESSURE: 82 MMHG | SYSTOLIC BLOOD PRESSURE: 114 MMHG

## 2022-07-28 DIAGNOSIS — R52 PAIN AGGRAVATED BY BREAST FEEDING: ICD-10-CM

## 2022-07-28 DIAGNOSIS — Z78.9 EXCLUSIVELY BREASTFEED INFANT: ICD-10-CM

## 2022-07-28 DIAGNOSIS — O92.79 PAIN AGGRAVATED BY BREAST FEEDING: ICD-10-CM

## 2022-07-28 DIAGNOSIS — Z71.89 ENCOUNTER FOR BREAST FEEDING COUNSELING: Primary | ICD-10-CM

## 2022-07-28 PROCEDURE — 99404 PREV MED CNSL INDIV APPRX 60: CPT | Performed by: PEDIATRICS

## 2022-07-28 NOTE — PATIENT INSTRUCTIONS
Nurse on demand: when baby gives hunger cues; when your breasts feel full, or at least every 3 hours counting from the beginning of one feeding to the beginning of the next; which ever comes first  When sucking and swallowing slow, gently compress the breast to restart flow  If active suck-swallow does not restart, gently remove the baby and offer the other breast; offering up to "four" breasts per feeding   Pay close attention to positioning for a deeper latch  Attaching Your Baby at the [employer] is a great resource for practicing effective positioning an determining if your baby is latching and feeding effectively  Supplement with expressed milk or formula if Cleaster Blight does not appear content after nursing  Feed expressed milk or formula as needed/desired  Paced bottle feeding technique is less stressful for your baby, prevents overfeeding and protects the breastfeeding relationship  You can find a video about paced bottle feeding at www lacted  org  For now, stop pumping unless a feeding at the breast is missed  Or limit pumping to once or twice a day after feeding  This may leave more milk in the breast for feedings and [place]ight may appear more content while feeding  Follow up as scheduled  Bring a small amount of expressed milk with you to the appointment  Please call with any questions or concerns

## 2022-07-28 NOTE — PROGRESS NOTES
INITIAL BREAST FEEDING EVALUATION    Informant/Relationship: Annika    Discussion of General Lactation Issues: Breastfeeding was going well until a few days ago  Miranda Valerio will no longer stay latched and Annika's nipples are sore  He has been given a few bottles recently and Reyna Negar feels he now prefers the bottle  Samuel's weight gain has been slow  Infant is 3weeks old today   History:  Fertility Problem:no  Breast changes:yes - breasts were joseph, areola are larger  : yes - induced due to concerns with baby's size  Full term:yes - 44 2/7 weeks   labor:no  First nursing/attempt < 1 hour after birth:yes - baby latched in the delivery room  Skin to skin following delivery:yes - in the delivery room  Breast changes after delivery:yes - breasts are joseph    Milk was in by day 3  Rooming in (infant in room with mother with exception of procedures, eg  Circumcision: yes  Blood sugar issues:no  NICU stay:no  Jaundice:no  Phototherapy:no  Supplement given: (list supplement and method used as well as reason(s):yes - donor milk via bottle due to crying for more milk    Past Medical History:   Diagnosis Date    Depression     History of transfusion         Migraine headache 2022    Varicella     vaccine         Current Outpatient Medications:     acetaminophen (TYLENOL) 500 mg tablet, Take 1 tablet (500 mg total) by mouth every 6 (six) hours as needed for mild pain, Disp: , Rfl: 0    docusate sodium (COLACE) 100 mg capsule, Take 1 capsule (100 mg total) by mouth 2 (two) times a day as needed for constipation, Disp: 60 capsule, Rfl: 0    ferrous sulfate 324 (65 Fe) mg, Take 1 tablet (324 mg total) by mouth every other day, Disp: 90 tablet, Rfl: 1    ibuprofen (MOTRIN) 600 mg tablet, Take 1 tablet (600 mg total) by mouth every 6 (six) hours as needed for mild pain, Disp: 60 tablet, Rfl: 1    meclizine (ANTIVERT) 25 mg tablet, Take 1 tablet (25 mg total) by mouth as needed in the morning and 1 tablet (25 mg total) as needed at noon and 1 tablet (25 mg total) as needed in the evening for dizziness  (Patient not taking: No sig reported), Disp: 30 tablet, Rfl: 0    norethindrone (Ortho Micronor) 0 35 MG tablet, Take 1 tablet (0 35 mg total) by mouth daily, Disp: 28 tablet, Rfl: 2    Prenatal Vit-Fe Fumarate-FA (PRENATAL VITAMIN PO), , Disp: , Rfl:     Allergies   Allergen Reactions    Benadryl [Diphenhydramine] Hives       Social History     Substance and Sexual Activity   Drug Use No       Social History Never a smoker    Interval Breastfeeding History:    Frequency of breast feeding: Attempting every 2 hours  If he doesn't wake on his own, Gisele De La Torre will wake him  Does mother feel breastfeeding is effective: No, he either falls asleep or unlatches frequently  Does infant appear satisfied after nursing:Not for long  Stooling pattern normal: Yes  Urinating frequently:Yes  Using shield or shells: No    Alternative/Artificial Feedings:   Bottle: Yes, occasionally  Cup: No  Syringe/Finger: No           Formula Type: none                     Amount: n/a            Breast Milk:                      Amount: 3 ounces            Frequency Q 2 Hr between feedings  Elimination Problems: No      Equipment:  Nipple Shield             Type: none             Size: n/a             Frequency of Use: n/a  Pump            Type: [place] Stride            Frequency of Use: Currently after every feeding  Expresses about 1-2 ounce per session  Gisele De La Torre began pumping with this routine pretty much as soon as her milk came in  Shells            Type: none            Frequency of use: n/a    Equipment Problems: no    Mom:  Breast: Very small widely spaced breasts  Slightly rounded shape  Nipple Assessment in General: Normal: elongated/eraser, no discoloration and no damage noted  Sensitive on the face of both nipples  Mother's Awareness of Feeding Cues                 Recognizes:  Yes                  Verbalizes: Yes  Support System: FOB, extended family  History of Breastfeeding:  older child once  Exclusively pumped for 2 months but never established full milk production  Changes/Stressors/Violence: Willard Elders is concerned that David Knowles is no longer latching or feeding effectively  Concerns/Goals: Willard Elders desires to EBF as long as possible  Problems with Mom: sore nipples    Physical Exam  Constitutional:       Appearance: Normal appearance  HENT:      Head: Normocephalic and atraumatic  Cardiovascular:      Rate and Rhythm: Normal rate and regular rhythm  Pulses: Normal pulses  Heart sounds: Normal heart sounds  Pulmonary:      Effort: Pulmonary effort is normal       Breath sounds: Normal breath sounds  Musculoskeletal:         General: Normal range of motion  Cervical back: Normal range of motion and neck supple  Neurological:      Mental Status: She is alert and oriented to person, place, and time  Skin:     General: Skin is warm and dry  Psychiatric:         Mood and Affect: Mood normal          Behavior: Behavior normal          Thought Content: Thought content normal          Judgment: Judgment normal          Infant:  Behaviors: Alert  Color: Pink  Birth weight: 3910gram  Current weight: 3715gram    Problems with infant: not latching or nursing effectively, slow weight gain      General Appearance:  Alert, active, no distress                             Head:  Normocephalic, AFOF, sutures opposed                             Eyes:  Conjunctiva clear, no drainage                              Ears:  Normally placed, no anomolies                             Nose:  no drainage or erythema                           Mouth:  No lesions  Tongue extends to the lower lip, does not lateralize well and remains flat while crying  Good cupping of my finger noted but very little effective sucking  The tongue primarily compressed my finger   Over time there were some very short bursts of effective sucking  Neck:  Supple, symmetrical, trachea midline                 Respiratory:  No grunting, flaring, retractions, breath sounds clear and equal            Cardiovascular:  Regular rate and rhythm  No murmur  Adequate perfusion/capillary refill  Femoral pulse present                    Abdomen:   Soft, non-tender, no masses, bowel sounds present, no HSM             Genitourinary:  Normal male, testes descended, no discharge, swelling, or pain, anus patent                          Spine:   Deep sacral dimple        Musculoskeletal:  Full range of motion  Curls into a deep "C" shape when positioned on either side  Curls his legs up under his body when positioned prone  Skin/Hair/Nails:   Skin warm, dry, and intact, no rashes or abnormal dyspigmentation or lesions                Neurologic:   No abnormal movement, tone appropriate for gestational age    [place] Latch:  Efficiency:               Lips Flanged: Yes              Depth of latch: appeared deep              Audible Swallow: Yes, for very brief periods  He also pulled on the nipple and kneaded the breast repeatedly with his hands              Visible Milk: Yes              Wide Open/ Asymmetrical: Yes              Suck Swallow Cycle: Breathing: unlabored, Coordinated: yes  Nipple Assessment after latch: creased and painful  Latch Problems: Jarvis Nassar was able to latch without difficulty  Only very brief periods of effective nutritive sucking were noted  He fed on three breasts, repeatedly falling asleep and then quickly cuing to feed again when taken off the breast   He was offered a fourth breast and then appeared content  Position:  Infant's Ergonomics/Body               Body Alignment: Yes               Head Supported: Yes               Close to Mom's body/ Lifted/ Supported: Yes               Mom's Ergonomics/Body: Yes, after education                           Supported:  Yes                           Sitting Back: Yes, after education                           Brings Baby to her breast: Yes, after education  Positioning Problems: Initially, Melanie Johns leaned over Brady de Camaces and pushed her nipple directly at his mouth  Handouts:   Paced bottle feeding, Hands on pumping, Hand expression, Increasing your supply and Latch Check List    Education:  Reviewed Latch: Demonstrated how to gently compress the breast and align the baby so that his nose is just above the nipple with his lower lip and chin touching the breast to encourage the deepest, widest, off-center latch  Reviewed Positioning for Dyad: demonstrated how to position mom comfortably support before bringing her baby to her breast  Reviewed Frequency/Supply & Demand: discussed how milk production is established and maintained  Reviewed Alternative/Artificial Feedings: Discussed paced bottle feeding technique  Reviewed Mom/Breast care: discussed using a protective oil or ointment to soothe sore nipples  Plan:   Reassurance and support given  I encouraged Annika to continue to feed Samuel at least every 3 hours for now  We worked on positioning to improve her comfort and Samuel's ability to latch effectively  She is very effective with gentle breast compressions and switch nursing  I recommended supplementing with expressed milk or formula if Brady de Camaces still appears hungry after feeding on 4 breasts  She was given information about paced bottle feeding  I suggested that she stop pumping after every feeding and observe if Brady de Camaces appears more settled and content when feeding on a joseph breast   A follow up appointment was scheduled for more evaluation of Samuel's ability to feed effectively and Annika's milk production  I plan to use an SNS to help me determine more clearly what the issue is  I encouraged Annika to call with any questions or concerns      I have spent 90 minutes with Patient and family today in which greater than 50% of this time was spent in counseling/coordination of care regarding Patient and family education

## 2022-08-03 NOTE — PROGRESS NOTES
I have reviewed the notes, assessments, and/or procedures performed by Arcelia Rob RN, IBCLC, I concur with her/his documentation of Kylee Drew MD 08/02/22

## 2022-08-04 ENCOUNTER — TELEPHONE (OUTPATIENT)
Dept: NEUROLOGY | Facility: CLINIC | Age: 22
End: 2022-08-04

## 2022-08-04 NOTE — TELEPHONE ENCOUNTER
Called and spoke to patient - confirmed upcoming appointment with Shaye Harper on 08/16/22 10:15 am at the [facility] office  Provided patient with apt date, time and location  Informed patient that check in is at least 15 minutes prior to apt time  The patient is not  having any issues or concerns at this time

## 2022-08-10 ENCOUNTER — TELEMEDICINE (OUTPATIENT)
Dept: BEHAVIORAL/MENTAL HEALTH CLINIC | Facility: CLINIC | Age: 22
End: 2022-08-10
Payer: COMMERCIAL

## 2022-08-10 DIAGNOSIS — F32.A DEPRESSIVE DISORDER: Primary | ICD-10-CM

## 2022-08-10 DIAGNOSIS — F41.9 ANXIETY: ICD-10-CM

## 2022-08-10 PROCEDURE — 90832 PSYTX W PT 30 MINUTES: CPT | Performed by: SOCIAL WORKER

## 2022-08-10 NOTE — PSYCH
Psychotherapy Provided: Individual Psychotherapy 30 minutes     Length of time in session: 30 minutes, follow up in PRN     Encounter Diagnosis     ICD-10-CM    1  Depressive disorder  F32  A    2  Anxiety  F41 9        Goals addressed in session: Goal 1     Pain:      none    0    Current suicide risk : Low         Behavioral Health Treatment Plan [facility]: Diagnosis and Treatment Plan explained to Parvez Ridley relates understanding diagnosis and is agreeable to Treatment Plan  Yes   Virtual Regular Visit    Verification of patient location:    Patient is located in the following state in which I hold an active license [place]      Assessment/Plan:    Problem List Items Addressed This Visit        Other    Depressive disorder - Primary      Other Visit Diagnoses     Anxiety              Goals addressed in session: Goal 1 : Reduce anxiety & depression         Reason for visit is No chief complaint on file  Encounter provider Cesario Carlin LCSW    Provider located at [address] [address] [phone]      Recent Visits  No visits were found meeting these conditions  Showing recent visits within past 7 days and meeting all other requirements  Today's Visits  Date Type Provider Dept   08/10/22 Telemedicine Cesario Carlin LCSW [facility]   Showing today's visits and meeting all other requirements  Future Appointments  No visits were found meeting these conditions  Showing future appointments within next 150 days and meeting all other requirements       The patient was identified by name and date of birth  Alin Vora was informed that this is a telemedicine visit and that the visit is being conducted through [address] [place] [address] embedded platform and patient was informed that this is a secure, HIPAA-compliant platform  She agrees to proceed     My office door was closed   No one else was in the room  She acknowledged consent and understanding of privacy and security of the video platform  The patient has agreed to participate and understands they can discontinue the visit at any time  Patient is aware this is a billable service  Isa Contreras is a 19 y o  female who presents for follow up therapy session   Met with pt from [id number] [phone] [id number]  Pt gave birth to Carolee RIVERA on 22 via   Pt was induced and in labor for 14 hours  She had an uncomplicated course while in the hospital   Pt was breastfeeding but the baby had difficulty with latch  Pt is now pumping and bottle feeding breast milk exclusively  Pt reported that she is doing suprisingly well  Pt experienced PPD after the birth of her 3yo son and was anticipating the same but she is feeling well  She does find that she can get short tempered with her 4yo at times but otherwise has been able to remain calm  Pt and her  had closing on their home on   Pt explained that the home was rented out to several people who each had their own room  The owner  and the executrise of the estate told everyone they needed to get out and take whatever belongings they wanted  Pt explained that the house was packed full of items from furniture, to clothes, etc  They were given a paid dumpster but still need to go through the attic and basement  In the meantime, pt's  is working on home improvements one room at a time  Pt is taking this very well and remaining calm  Pt is getting assistance from her mother as well  Pt's 3yo son, Amy De La Cruz is adjusting well to having the baby in the home  Pt has a good appetite and is getting some sleep  Overall, she is feeling well and will follow up as needed         HPI     Past Medical History:   Diagnosis Date    Depression     History of transfusion         Migraine headache 2022    Varicella     vaccine       Past Surgical History:   Procedure Laterality Date  SKIN GRAFT Left     left elbow    SKIN GRAFT      left elbow       Current Outpatient Medications   Medication Sig Dispense Refill    acetaminophen (TYLENOL) 500 mg tablet Take 1 tablet (500 mg total) by mouth every 6 (six) hours as needed for mild pain (Patient not taking: Reported on 7/28/2022)  0    docusate sodium (COLACE) 100 mg capsule Take 1 capsule (100 mg total) by mouth 2 (two) times a day as needed for constipation 60 capsule 0    ibuprofen (MOTRIN) 600 mg tablet Take 1 tablet (600 mg total) by mouth every 6 (six) hours as needed for mild pain 60 tablet 1    norethindrone (Ortho Micronor) 0 35 MG tablet Take 1 tablet (0 35 mg total) by mouth daily (Patient not taking: Reported on 7/28/2022) 28 tablet 2    Prenatal Vit-Fe Fumarate-FA (PRENATAL VITAMIN PO)        No current facility-administered medications for this visit  Allergies   Allergen Reactions    Benadryl [Diphenhydramine] Hives       Review of Systems: Pt was pleasant, cooperative and engaged  Video Exam    There were no vitals filed for this visit      Physical Exam     I spent 30 minutes directly with the patient during this visit

## 2022-08-12 DIAGNOSIS — G43.901 MIGRAINE WITH STATUS MIGRAINOSUS, NOT INTRACTABLE, UNSPECIFIED MIGRAINE TYPE: Primary | ICD-10-CM

## 2022-08-12 DIAGNOSIS — R51.0 ORTHOSTATIC HEADACHE: ICD-10-CM

## 2022-08-16 ENCOUNTER — OFFICE VISIT (OUTPATIENT)
Dept: NEUROLOGY | Facility: CLINIC | Age: 22
End: 2022-08-16
Payer: COMMERCIAL

## 2022-08-16 VITALS
TEMPERATURE: 97.4 F | HEART RATE: 95 BPM | WEIGHT: 179 LBS | RESPIRATION RATE: 16 BRPM | SYSTOLIC BLOOD PRESSURE: 125 MMHG | DIASTOLIC BLOOD PRESSURE: 67 MMHG | HEIGHT: 66 IN | BODY MASS INDEX: 28.77 KG/M2

## 2022-08-16 DIAGNOSIS — H53.9 TRANSIENT VISUAL DISTURBANCE: ICD-10-CM

## 2022-08-16 DIAGNOSIS — R51.0 ORTHOSTATIC HEADACHE: ICD-10-CM

## 2022-08-16 DIAGNOSIS — G43.901 MIGRAINE WITH STATUS MIGRAINOSUS, NOT INTRACTABLE, UNSPECIFIED MIGRAINE TYPE: Primary | ICD-10-CM

## 2022-08-16 PROCEDURE — 99213 OFFICE O/P EST LOW 20 MIN: CPT | Performed by: NURSE PRACTITIONER

## 2022-08-16 NOTE — PATIENT INSTRUCTIONS
Continue as needed use of ibuprofen or tylenol for headaches  Could consider magnesium oxide 400mg daily for prevention if frequency worsens  Let me know if any repeat episode of more severe migraine/visual disturbance  Get appointment with eye doctor  Log migraines with migraine buddy wyatt  Try to have regular sleep patterns as much as possible  Follow up as needed  Migraine Headache   AMBULATORY CARE:   A migraine headache  is a severe headache  The pain can be so severe that it interferes with your daily activities  A migraine can last a few hours up to several days  The exact cause of migraines is not known  A family history of migraines increases your risk  Your risk is also higher if you are a woman or take medicines such as estrogen or a vasodilator  Common warning signs include the following:  Warning signs usually start 15 to 60 minutes before the headache:  Visual changes (auras), such as blurred vision, temporary blind or bright spots, lines, or hallucinations    Unusual tiredness or frequent yawning    Tingling in an arm or leg    Signs and symptoms of a migraine headache:  A migraine headache usually begins as a dull ache around the eye or temple  The pain may get worse with movement  You may also have the following:  Pain in your head that may increase to the point that you cannot do everyday activities    Pain on one or both sides of your head    Throbbing, pulsing, or pounding pain in your head    Nausea and vomiting    Sensitivity to light, noise, or smells    Call your local emergency number (911 in the [address] [place] [address],[address]) or have someone call if:   You feel like you are going to faint, you become confused, or you have a seizure  Seek care immediately if:   You have a headache that seems different or much worse than your usual migraine headache  You have a severe headache with a fever or a stiff neck  You have new problems with speech, vision, balance, or movement      Call your doctor or neurologist if:   You have a fever  Your migraines interfere with your daily activities  Your medicines or treatments stop working  You have questions about your condition or care  Treatment:  Migraines cannot be cured  The goal of treatment is to reduce your symptoms  Take medicine as soon as you feel a migraine begin, or as directed  The following may be used to manage migraines:  Medicines  may be given to prevent or treat migraines  Take medicine to prevent migraines as soon as you feel a migraine begin, or as directed  Your healthcare provider may recommend any of the following:    Migraine medicines  are used to help prevent or stop a migraine  NSAIDs  help decrease swelling and pain or fever  This medicine is available with or without a doctor's order  NSAIDs can cause stomach bleeding or kidney problems in certain people  If you take blood thinner medicine, always ask your healthcare provider if NSAIDs are safe for you  Always read the medicine label and follow directions  Acetaminophen  decreases pain and fever  It is available without a doctor's order  Ask how much to take and how often to take it  Follow directions  Read the labels of all other medicines you are using to see if they also contain acetaminophen, or ask your doctor or pharmacist  Acetaminophen can cause liver damage if not taken correctly  Do not use more than 4 grams (4,000 milligrams) total of acetaminophen in one day  Prescription pain medicine  may be given  Ask your healthcare provider how to take this medicine safely  Some prescription pain medicines contain acetaminophen  Do not take other medicines that contain acetaminophen without talking to your healthcare provider  Too much acetaminophen may cause liver damage  Prescription pain medicine may cause constipation  Ask your healthcare provider how to prevent or treat constipation  Antinausea medicine  may be given to calm your stomach and to help prevent vomiting   This medicine can also help relieve pain  Cognitive behavior therapy (CBT)  can help you learn ways to manage and prevent migraines  A therapist can teach you to relax and to reduce stress  You may learn ways to create healthy nutrition, activity, and sleep habits to prevent migraines  The therapist can also help you manage conditions that can affect migraines, such as anxiety or depression  Common triggers for a migraine include the following:   Stress, eye strain, oversleeping, or not getting enough sleep    Hormone changes in women from birth control pills, pregnancy, menopause, or during a monthly period    Skipping meals, going too long without eating, or not drinking enough liquids    Certain foods or drinks such as chocolate, hard cheese, alcohol, or drinks that contain caffeine    Foods that contain gluten, nitrates, MSG, or artificial sweeteners    Sunlight, bright or flashing lights, loud noises, smoke, or strong smells    Heat, humidity, or changes in the weather    Manage your symptoms:   Rest in a dark, quiet room  This will help decrease your pain  Sleep may also help relieve the pain  Apply ice to decrease pain  Use an ice pack, or put crushed ice in a plastic bag  Cover the ice pack with a towel and place it on your head where it hurts for 15 to 20 minutes every hour  Apply heat to decrease pain and muscle spasms  Use a small towel dampened with warm water or a heating pad, or sit in a warm bath  Apply heat on the area for 20 to 30 minutes every 2 hours  You may alternate heat and ice  Keep a migraine record  Write down when your migraines start and stop  Include your symptoms and what you were doing when a migraine began  Record what you ate or drank for 24 hours before the migraine started  Keep track of what you did to treat your migraine and if it worked  Prevent another migraine headache:   Prevent a medicine overuse headache  Take pain medicines only as long as directed   A medicine may be limited to a certain amount each month  Your healthcare provider can help you create a plan so you get a safe amount each month  Do not smoke  Nicotine and other chemicals in cigarettes and cigars can trigger a migraine and also cause lung damage  Ask your healthcare provider for information if you currently smoke and need help to quit  E-cigarettes or smokeless tobacco still contain nicotine  Talk to your healthcare provider before you use these products  Do not drink alcohol  Alcohol can trigger a migraine  It can also interfere with the medicines used to treat your migraine  Be physically active  Physical activity, such as exercise, may help prevent migraines  Talk to your healthcare provider about the best activity plan for you  Try to get at least 30 minutes of physical activity on most days  Manage stress  Stress may trigger a migraine  Learn new ways to relax, such as deep breathing  Follow a sleep schedule  Go to bed and get up at the same time each day  Eat a variety of healthy foods  Healthy foods include fruit, vegetables, whole-grain breads, low-fat dairy products, beans, lean meats, and fish  Do not have foods or drinks that trigger your migraines  Drink more liquids to prevent dehydration  Your healthcare provider can tell you how much liquid to drink each day and which liquids are best for you  Follow up with your doctor or neurologist as directed:  Bring your migraine record with you  Write down your questions so you remember to ask them during your visits  © Copyright [employer] 2022 Information is for End User's use only and may not be sold, redistributed or otherwise used for commercial purposes  All illustrations and images included in CareNotes® are the copyrighted property of [employer]  or Adolfo Vidal  The above information is an  only   It is not intended as medical advice for individual conditions or treatments  Talk to your doctor, nurse or pharmacist before following any medical regimen to see if it is safe and effective for you

## 2022-08-16 NOTE — PROGRESS NOTES
Patient ID: Lucia Begum is a 19 y o  female  Assessment/Plan:  Patient Instructions:  Continue as needed use of ibuprofen or tylenol for headaches  Could consider magnesium oxide 400mg daily for prevention if frequency worsens  Let me know if any repeat episode of more severe migraine/visual disturbance  Get appointment with eye doctor  Log migraines with migraine buddy wyatt  Try to have regular sleep patterns as much as possible  Follow up as needed  Diagnoses and all orders for this visit:    Migraine with status migrainosus, not intractable, unspecified migraine type  -     Ambulatory Referral to Neurology    Orthostatic headache  Comments:  resolved  Orders:  -     Ambulatory Referral to Neurology    Transient visual disturbance  -     Ambulatory Referral to Ophthalmology; Future         Subjective:    HPI  Karen Cook is a 19year old   female with history of post partum depression and family history of migraine in her aunt  who presents for hospital follow up from ED visit 2022 where she had headache in 3rd trimester of pregnancy  She had no prior migraine history  She had an episode where she had difficulty seeing followed by dizziness and a frontal headache  She was treated with reglan/benadryl/magnesium/solumedrol at that time and was discharged after negative MRI/MRV/CT head  She also had a normal follow up echocardiogram for shortness of breath  She delivered 2022  She states did have more daily type tension headaches after delivery which she treats with 600mg ibuprofen which helps with one dose  She thinks these headaches are related to lack of sleep with a   The frequency has reduced recently to 4x/week and she denies any recent visual symptoms  She has not seen an eye doctor in some time  She is breastfeeding, no complaints with this  She states hydrating and eating well; getting about 3-4 hours of sleep at a time  She has continued her vitamins       Hospital information:  presented to [facility] on 5/24/2022 due to headache, dizziness, nausea, photophobia, and visual disturbances (seeing floaters and felt that she was looking through a kaleidoscope)  Upon arrival to the ED, /62  CT head negative for acute intracranial abnormality  MRV head negative for dural venous sinus thrombosis  MRA head/carotid unremarkable  Patient received Solu-Medrol 500 mg, Reglan 10 mg, Benadryl 25 mg, magnesium sulfate 2 g, and IV fluids  Patient states that the headache has improved  The following portions of the patient's history were reviewed and updated as appropriate: allergies, current medications, past family history, past medical history, past social history, past surgical history and problem list          Objective:    Blood pressure 125/67, pulse 95, temperature (!) 97 4 °F (36 3 °C), temperature source Temporal, resp  rate 16, height 5' 6" (1 676 m), weight 81 2 kg (179 lb), last menstrual period 10/11/2021, currently breastfeeding  Physical Exam  Vitals reviewed  Constitutional:       General: She is not in acute distress  Appearance: She is normal weight  She is not ill-appearing, toxic-appearing or diaphoretic  HENT:      Head: Normocephalic and atraumatic  Right Ear: External ear normal       Left Ear: External ear normal       Nose: Nose normal       Mouth/Throat:      Mouth: Mucous membranes are moist       Pharynx: Oropharynx is clear  Eyes:      General: Lids are normal       Extraocular Movements: Extraocular movements intact  Pupils: Pupils are equal, round, and reactive to light  Cardiovascular:      Rate and Rhythm: Normal rate  Pulses: Normal pulses  Pulmonary:      Effort: Pulmonary effort is normal    Abdominal:      General: There is no distension  Musculoskeletal:      Cervical back: Normal range of motion  Right lower leg: No edema  Left lower leg: No edema  Skin:     General: Skin is warm  Capillary Refill: Capillary refill takes less than 2 seconds  Findings: No rash  Neurological:      General: No focal deficit present  Mental Status: She is alert  Deep Tendon Reflexes: Strength normal and reflexes are normal and symmetric  Psychiatric:         Mood and Affect: Mood normal          Speech: Speech normal          Behavior: Behavior normal          Neurological Exam  Mental Status  Alert  Speech is normal  Language is fluent with no aphasia  Attention and concentration are normal  Fund of knowledge is appropriate for level of education  Cranial Nerves  CN II: Visual acuity is normal  Visual fields full to confrontation  CN III, IV, VI: Extraocular movements intact bilaterally  Normal lids and orbits bilaterally  Pupils equal round and reactive to light bilaterally  CN V: Facial sensation is normal   CN VII: Full and symmetric facial movement  CN VIII: Hearing is normal   CN IX, X: Palate elevates symmetrically  Normal gag reflex  CN XI: Shoulder shrug strength is normal   CN XII: Tongue midline without atrophy or fasciculations  Motor  Normal muscle bulk throughout  Normal muscle tone  No abnormal involuntary movements  Strength is 5/5 throughout all four extremities  Sensory  Light touch is normal in upper and lower extremities  Reflexes  Deep tendon reflexes are 2+ and symmetric in all four extremities  Coordination  Right: Finger-to-nose normal Left: Finger-to-nose normal     Gait  Normal casual, toe, heel and tandem gait  ROS:    Review of Systems   Constitutional: Negative  Negative for appetite change and fever  HENT: Negative  Negative for hearing loss, tinnitus, trouble swallowing and voice change  Eyes: Negative  Negative for photophobia and pain  Respiratory: Negative  Negative for shortness of breath  Cardiovascular: Negative  Negative for palpitations  Gastrointestinal: Negative  Negative for nausea and vomiting     Endocrine: Negative  Negative for cold intolerance  Genitourinary: Negative  Negative for dysuria, frequency and urgency  Musculoskeletal: Negative  Negative for myalgias and neck pain  Skin: Negative  Negative for rash  Neurological: Positive for headaches (Daily)  Negative for dizziness, tremors, seizures, syncope, facial asymmetry, speech difficulty, weakness, light-headedness and numbness  Hematological: Negative  Does not bruise/bleed easily  Psychiatric/Behavioral: Negative  Negative for confusion, hallucinations and sleep disturbance     ROS was reviewed and updated as appropriate

## 2022-08-18 ENCOUNTER — OFFICE VISIT (OUTPATIENT)
Dept: POSTPARTUM | Facility: CLINIC | Age: 22
End: 2022-08-18
Payer: COMMERCIAL

## 2022-08-18 DIAGNOSIS — Z71.89 ENCOUNTER FOR BREAST FEEDING COUNSELING: Primary | ICD-10-CM

## 2022-08-18 DIAGNOSIS — O92.79 PAIN AGGRAVATED BY BREAST FEEDING: ICD-10-CM

## 2022-08-18 DIAGNOSIS — R52 PAIN AGGRAVATED BY BREAST FEEDING: ICD-10-CM

## 2022-08-18 PROCEDURE — 99403 PREV MED CNSL INDIV APPRX 45: CPT | Performed by: PEDIATRICS

## 2022-08-18 NOTE — PATIENT INSTRUCTIONS
Continue to feed [employer] on demand  It's OK to take a break and feed expressed milk via bottle whenever you need to  Continue pumping whenever a feeding at the breast is missed  Keeping your breasts (nipples) warm after feeding or pumping may help decrease or stop your pain  Follow up with Dr Dolores Tejada as scheduled  Please call with any questions or concerns

## 2022-08-18 NOTE — PROGRESS NOTES
BREAST FEEDING FOLLOW UP VISIT    Informant/Relationship: Annika    Discussion of General Lactation Issues: Feedings are better "than expected" at this point but Luís Song has sore nipples  She is having sharp pain that begins in the nipples and radiates into her breast   She needs to take breaks from direct breastfeeding at times due to her pain  Luís Song has been taking [place] and feels it has helped with milk production  Infant is 10 weeks old today  Interval Breastfeeding History:    Frequency of breast feeding: Waking him him to feed every 2 hours to feed during the day  Sleeps 4-6 hour stretches at night  Feedings typically take an hour  Does mother feel breastfeeding is effective: No  Does infant appear satisfied after nursing: Only sometimes after offering 4 breasts  Stooling pattern normal:Yes  Urinating frequently:Yes  Using shield or shells:No    Alternative/Artificial Feedings:   Bottle: Yes, when feeding at the breast is too painful and to supplement as needed  Cup: No  Syringe/Finger: No           Formula Type: none                     Amount: n/a            Breast Milk:                      Amount: 1-3 ounces depending on whether he nursed first or not            Frequency Q 2-6 Hr between feedings  Elimination Problems: No      Equipment:  Nipple Shield             Type: none             Size: n/a             Frequency of Use: n/a  Pump            Type: [place] Stride            Frequency of Use: When a feeding at the breast is missed  Expresses 3-4 ounces per session  Shells            Type: none            Frequency of use: n/a    Equipment Problems: no    Mom:  Breast: Very small widely spaced breasts  Slightly rounded shape  Nipple Assessment in General: Normal: elongated/eraser, no discoloration and no damage noted  Sensitive on the face of both nipples  Mother's Awareness of Feeding Cues                 Recognizes:  Yes                  Verbalizes: Yes  Support System: FOB, extended family  History of Breastfeeding:  older child once  Exclusively pumped for 2 months but never established full milk production  Changes/Stressors/Violence: Alek Vick is concerned that Doretha Kwok is not nursing effectively, that feedings are painful and take a long time  Concerns/Goals: Alek Vick desires to EBF as long as possible  Physical Exam  Constitutional:       Appearance: Normal appearance  HENT:      Head: Normocephalic and atraumatic  Musculoskeletal:         General: Normal range of motion  Cervical back: Normal range of motion and neck supple  Neurological:      Mental Status: She is alert and oriented to person, place, and time  Skin:     General: Skin is warm and dry  Psychiatric:         Mood and Affect: Mood normal          Behavior: Behavior normal          Thought Content: Thought content normal          Judgment: Judgment normal          Infant:  Behaviors: Alert  Color: Pink  Birth weight: 3910gram  Current weight: 4725gram    Problems with infant: struggling to latch and feed effectively at the breast    General Appearance:  Alert, active, no distress                             Head:  Normocephalic, AFOF, sutures opposed                             Eyes:  Conjunctiva clear, no drainage                              Ears:  Normally placed, no anomolies                             Nose:  no drainage or erythema                           Mouth:  No lesions  High, narrow palate  Thick even white coating on the tongue  Tongue extends to the lower lip, does not lateralize well and remains flat while crying  Good cupping of my finger noted but very little effective sucking  The tongue primarily compressed my finger  Over time there were some very short bursts of effective sucking                                 Neck:  Supple, symmetrical, trachea midline                 Respiratory:  No grunting, flaring, retractions, breath sounds clear and equal            Cardiovascular: Regular rate and rhythm  No murmur  Adequate perfusion/capillary refill  Femoral pulse present                    Abdomen:   Soft, non-tender, no masses, bowel sounds present, no HSM             Genitourinary:  Normal male, testes descended, no discharge, swelling, or pain, anus patent                          Spine:   Deep sacral dimple        Musculoskeletal:  Full range of motion  Curls into a deep "C" shape when positioned on either side  Curls his legs up under his body when positioned prone  Skin/Hair/Nails:   Skin warm, dry, and intact, no rashes or abnormal dyspigmentation or lesions                Neurologic:   No abnormal movement, tone appropriate     [place]pie Latch:  Efficiency:               Lips Flanged: lower lip flanges, upper lip curls under              Depth of latch: fair  He popped on and off the breast repeatedly              Audible Swallow: Yes, but no sustained SSB noted  Visible Milk: Yes              Wide Open/ Asymmetrical: No              Suck Swallow Cycle: Breathing: unlabored, Coordinated yes  Nipple Assessment after latch: Normal: elongated/eraser, no discoloration and no damage noted  Latch Problems: Bryan Chávez did not latch or transfer milk effectively  He needed to be repositioned and relatched many times  He did appear content after feeding    Position:  Infant's Ergonomics/Body               Body Alignment: Yes               Head Supported: Yes               Close to Mom's body/ Lifted/ Supported: Yes               Mom's Ergonomics/Body: Yes                           Supported: Yes                           Sitting Back: Yes, after latch                           Brings Baby to her breast: No  Positioning Problems: Preet Player leans forward to offer her breast, pushes her nipple down into Samuel's mouth and then leans back for the feeding        Handouts:   None    Education:  Reviewed Latch: Discussed potential reasons for Samuel's feeding difficulties  Discussed methods for relieving Annika's breast and nipple pain  Plan:   Reassurance and support given  I encouraged Annika to continue to feed [place] on demand  I reassured her that it is ok to take a break from direct breastfeeding whenever she feels she needs to  I encouraged her to continue pumping when a feeding at the breast is missed  I recommended that she use warmed breast pads immediately after feeding or pumping to help prevent or alleviate her pain  A follow up appointment was scheduled with Dr Nicko Lara for more evaluation and support  I encouraged her to call with any questions or concerns  I have spent 45 minutes with Patient and family today in which greater than 50% of this time was spent in counseling/coordination of care regarding Patient and family education

## 2022-08-21 NOTE — PROGRESS NOTES
I have reviewed the notes, assessments, and/or procedures performed by Aki Doan RN, IBCLC, I concur with her/his documentation of Elena Obando MD 08/21/22

## 2022-08-26 ENCOUNTER — POSTPARTUM VISIT (OUTPATIENT)
Dept: OBGYN CLINIC | Facility: MEDICAL CENTER | Age: 22
End: 2022-08-26
Payer: COMMERCIAL

## 2022-08-26 VITALS
DIASTOLIC BLOOD PRESSURE: 70 MMHG | BODY MASS INDEX: 28.43 KG/M2 | WEIGHT: 176.9 LBS | HEIGHT: 66 IN | SYSTOLIC BLOOD PRESSURE: 108 MMHG

## 2022-08-26 LAB
BASOPHILS # BLD AUTO: 0.05 THOUSANDS/ΜL (ref 0–0.1)
BASOPHILS NFR BLD AUTO: 1 % (ref 0–1)
EOSINOPHIL # BLD AUTO: 0.22 THOUSAND/ΜL (ref 0–0.61)
EOSINOPHIL NFR BLD AUTO: 3 % (ref 0–6)
ERYTHROCYTE [DISTWIDTH] IN BLOOD BY AUTOMATED COUNT: 13.4 % (ref 11.6–15.1)
HCT VFR BLD AUTO: 40.9 % (ref 34.8–46.1)
HGB BLD-MCNC: 13.2 G/DL (ref 11.5–15.4)
IMM GRANULOCYTES # BLD AUTO: 0.02 THOUSAND/UL (ref 0–0.2)
IMM GRANULOCYTES NFR BLD AUTO: 0 % (ref 0–2)
LYMPHOCYTES # BLD AUTO: 2.52 THOUSANDS/ΜL (ref 0.6–4.47)
LYMPHOCYTES NFR BLD AUTO: 33 % (ref 14–44)
MCH RBC QN AUTO: 29.3 PG (ref 26.8–34.3)
MCHC RBC AUTO-ENTMCNC: 32.3 G/DL (ref 31.4–37.4)
MCV RBC AUTO: 91 FL (ref 82–98)
MONOCYTES # BLD AUTO: 0.6 THOUSAND/ΜL (ref 0.17–1.22)
MONOCYTES NFR BLD AUTO: 8 % (ref 4–12)
NEUTROPHILS # BLD AUTO: 4.13 THOUSANDS/ΜL (ref 1.85–7.62)
NEUTS SEG NFR BLD AUTO: 55 % (ref 43–75)
NRBC BLD AUTO-RTO: 0 /100 WBCS
PLATELET # BLD AUTO: 323 THOUSANDS/UL (ref 149–390)
PMV BLD AUTO: 9.4 FL (ref 8.9–12.7)
RBC # BLD AUTO: 4.5 MILLION/UL (ref 3.81–5.12)
TSH SERPL DL<=0.05 MIU/L-ACNC: 1.23 UIU/ML (ref 0.45–4.5)
WBC # BLD AUTO: 7.54 THOUSAND/UL (ref 4.31–10.16)

## 2022-08-26 PROCEDURE — 84443 ASSAY THYROID STIM HORMONE: CPT | Performed by: OBSTETRICS & GYNECOLOGY

## 2022-08-26 PROCEDURE — 99024 POSTOP FOLLOW-UP VISIT: CPT | Performed by: OBSTETRICS & GYNECOLOGY

## 2022-08-26 PROCEDURE — 36415 COLL VENOUS BLD VENIPUNCTURE: CPT | Performed by: OBSTETRICS & GYNECOLOGY

## 2022-08-26 PROCEDURE — 85025 COMPLETE CBC W/AUTO DIFF WBC: CPT | Performed by: OBSTETRICS & GYNECOLOGY

## 2022-08-26 NOTE — PATIENT INSTRUCTIONS
Postpartum Depression   AMBULATORY CARE:   Postpartum depression  is a mood disorder that occurs after your baby is born  Your symptoms may last up to 12 months after delivery  Your symptoms may become serious and affect your daily activities and relationships  The exact cause is not known  Hormone levels that increased during pregnancy suddenly drop after your baby is born  This can cause your symptoms  A past episode of postpartum depression or a family history of depression may increase your risk  A  may also be done if you are pregnant with more than 1 baby  Postpartum depression may also be trigged by a lack of support at home, stress, or medical problems  Common signs and symptoms include the following:   Feeling sad, anxious, tearful, discouraged, hopeless, or alone    Thoughts that you are not a good mother    Trouble completing daily tasks, concentrating, or remembering things    Lack of appetite    Lack interest in your baby    Feeling restless, irritable, or withdrawn    An overwhelmed feeling with your new baby and a belief that it will not get better    Feeling unimportant or guilty most of the time    Trouble sleeping, even after the baby is asleep    Call your local emergency number (911 in the [address] [facility]) if:   You think about hurting yourself or your baby  Seek care immediately if:   Your feelings of depression or sadness are strong  Call your doctor if:   Your symptoms last most of the day for days in a row  Your symptoms last more than 1 week  You have questions or concerns about your condition or care  How postpartum depression is diagnosed:  Healthcare providers will talk to you about how you are feeling and ask if you have any depression  These talks can happen during appointments for your medical care and for your baby's care, such as well child visits   Talk to your providers about the following:  When you started to feel depressed, and if it is getting worse over time    Problems you are having with daily activities, sleep, or caring for your baby    If anything makes your depression worse, or makes you feel better    Feeling that you are not bonding with your baby the way you want    Any problems your baby has with sleeping or feeding    If your baby is fussy or cries a lot    Support you have from friends, family, or others    Treatment  may include any of the following:  A therapist  can help you find ways to cope with your feelings  This can be done alone or in a group  Antidepressants  help decrease or stop your symptoms  Self-care: You may feel better quickly, or if may take a few weeks to feel better  Be patient with yourself  Do the following to take care of yourself:  Rest as needed  Take a nap or rest while your baby sleeps  Ask someone to watch your baby while you nap  Get emotional support  Share your feelings with your partner, a friend, or another mother  Ask your partner, friends, or family to help with cooking or cleaning  Do only what is needed and let other things wait until later  Exercise when you can  Even 5 or 10 minutes of exercise can help improve your mood  Walk around the block or do some stretching exercises  Eat a variety of healthy foods  Healthy foods include fruits, vegetables, whole-grain breads, low-fat dairy products, beans, lean meats, and fish  Do not skip meals  Take care of yourself  Shower and dress each day  Write in a journal  Celebrate small achievements, even if it is only 1 thing a day  Try to get out of the house a little each day  Meet a friend for coffee  Follow up with your doctor as directed: Your doctor may refer you to a therapist or other specialist  Write down your questions so you remember to ask them during your visits  © Copyright [employer] 2022 Information is for End User's use only and may not be sold, redistributed or otherwise used for commercial purposes   All illustrations and images included in CareNotes® are the copyrighted property of A D A M , Inc  or Adolfo Vidal  The above information is an  only  It is not intended as medical advice for individual conditions or treatments  Talk to your doctor, nurse or pharmacist before following any medical regimen to see if it is safe and effective for you

## 2022-08-26 NOTE — PROGRESS NOTES
OB POSTPARTUM VISIT PROGRESS NOTE  Date of Encounter: 2022    Negrita Cancer    : 2000  (19 y o )  MR: [id number]    Megan Vance is in for her postpartum visit  She is now [id number]  She delivered by normal spontaneous vaginal delivery  She's generally doing well, denies current pain or bleeding issues  She is pumping exclusively  We discussed all appropriate contraceptive options and she chooses mini pill which she has but has not initiated   Her EDPS score is 23  - states she was seeing Seda Leary (therapist) but has not made an appointment will do so this week   Denies SI or HI   States she knows shehas had anxiety and  Depression prior to her pregnancies that she has managed on her own  States does not desire to initiate medication at this time  And has support at home         Objective   EXAM:  GENERAL: alert, well appearing, and in no distress  VITALS: Blood pressure 108/70, height 5' 6" (1 676 m), weight 80 2 kg (176 lb 14 4 oz), last menstrual period 10/11/2021, currently breastfeeding  BMI: Body mass index is 28 55 kg/m²  LUNGS:unlabored breathing    BREASTS: Deferred pumping at time of visit    ABDOMEN: soft depressible non tender  EXTREMITIES: All normal  No edema  PELVIC:   VULVA: Nml EGBUS  normal    VAGINA: Introitus well healed, slightly tender  CERVIX: Normal, no discharge  UTERUS: Anteverted, NSSC, [place], NT    RIGHT ADNEXUM: Nontender, no mass  LEFT ADNEXUM: Nontender, no mass  RECTAL: Deferred  Assessment/Plan   Diagnoses and all orders for this visit:    Post partum depression  -     CBC and differential; Future  -     TSH, 3rd generation with Free T4 reflex;  Future  Follow up in 2 weeks   To Call therapist for appointment  (has been seen by Seda Leary before)      (spontaneous vaginal delivery)  -normal 6 week visit   encouraged to initiate mini pill which she already has            Dianne Weems MD

## 2022-09-08 ENCOUNTER — SOCIAL WORK (OUTPATIENT)
Dept: BEHAVIORAL/MENTAL HEALTH CLINIC | Facility: CLINIC | Age: 22
End: 2022-09-08
Payer: COMMERCIAL

## 2022-09-08 DIAGNOSIS — F41.9 ANXIETY: Primary | ICD-10-CM

## 2022-09-08 PROCEDURE — 90834 PSYTX W PT 45 MINUTES: CPT | Performed by: SOCIAL WORKER

## 2022-09-08 NOTE — PSYCH
Psychotherapy Provided: Individual Psychotherapy 45 minutes     Length of time in session: 45 minutes, follow up in 2 week    Encounter Diagnosis     ICD-10-CM    1  Anxiety  F41 9    2  Post partum depression  F53 0        Goals addressed in session: Goal 1     Pain:      none    0    Current suicide risk : Low         Behavioral Health Treatment Plan [facility]: Diagnosis and Treatment Plan explained to Samir Cohn relates understanding diagnosis and is agreeable to Treatment Plan  Yes Assessment/Plan:      Diagnoses and all orders for this visit:    Anxiety    Post partum depression          Subjective:  Pt presented for follow up therapy session  Met with pt from 3833 - 5362  Pt was last seen for therapy on 8/10 and was encouraged to schedule a follow up by her OB  Pt has been feeling depressed, worthless, easily irritated and having intrusive thoughts  She has thoughts of self harm but has not acted on these thoughts  She denies HI  She "can't sleep half the time " Her appetite is good however  She feels overwhelmed by her 5yo son and her   She describes them both as constantly on the move and asking her questions  Pt is due to return to work at the end of October and does not feel as though this is possible at this moment  Discussed IOP with pt but she has done this in the past and did not feel she benefitted  She is willing to go back on Zoloft and will speak to her OB at her appt tomorrow  Pt will also focus on better self care and will ask her MIL to watch her 4yo at least 2 days per week  Patient ID: Zia Silva is a 19 y o  female  HPI    Review of Systems  : Pt was pleasant, cooperative and engaged   She was tearful     Objective:     Physical Exam

## 2022-09-09 ENCOUNTER — OFFICE VISIT (OUTPATIENT)
Dept: OBGYN CLINIC | Facility: CLINIC | Age: 22
End: 2022-09-09
Payer: COMMERCIAL

## 2022-09-09 VITALS
SYSTOLIC BLOOD PRESSURE: 116 MMHG | DIASTOLIC BLOOD PRESSURE: 60 MMHG | BODY MASS INDEX: 28.67 KG/M2 | HEIGHT: 66 IN | WEIGHT: 178.4 LBS

## 2022-09-09 PROCEDURE — 99213 OFFICE O/P EST LOW 20 MIN: CPT | Performed by: OBSTETRICS & GYNECOLOGY

## 2022-09-09 NOTE — PROGRESS NOTES
Assessment Billie Jade was seen today for follow-up and postpartum care  Diagnoses and all orders for this visit:    Postpartum depression  -     sertraline (Zoloft) 50 mg tablet; Take 1 tablet (50 mg total) by mouth daily         Plan  We discussed possible risks of medication as well as alternative to medications   We discussed if SI or HI emergency room access  Encouraged to continue follow up with therapist   Follow up in 2-3 weeks with me    Subjective   Ricco Marino is a 19 y o  female here for a depression in postpartum state follow up  States she met with therapist yesterday and they discussed medication initiation and thinks this is best route for her   She states feeling only slightly better / although she has had suicidal thoughts states has no plan and does not desire to go through with any action  States she has been a  past and has not re initiated this at this time and does not desire to do so  States she has talked to  and they have  A plan in which she will be hopefully get more rest   Feels supported at home     Patient Active Problem List   Diagnosis    COVID-19 affecting pregnancy in first trimester    Bipolar depression ([facility] [address]    Depressive disorder    Insomnia    39 weeks gestation of pregnancy    Depression during pregnancy in third trimester    Anemia during pregnancy in third trimester    Migraine headache    Prior fetal macrosomia in third trimester, antepartum    Vaginal discharge during pregnancy     (spontaneous vaginal delivery)    At risk for postpartum depression       Gynecologic History  No LMP recorded (lmp unknown)        Past Medical History:   Diagnosis Date    Depression     History of transfusion         Migraine headache 2022    Varicella     vaccine     Past Surgical History:   Procedure Laterality Date    SKIN GRAFT Left     left elbow    SKIN GRAFT      left elbow     Family History   Problem Relation Age of Onset    Hypothyroidism Mother     Bipolar disorder Mother     Mental illness Mother     Thyroid disease Mother         Hypothyroidism    Schizophrenia Mother     No Known Problems Father     Cancer Maternal Grandmother         Breast cancer    Heart disease Maternal Grandmother     Breast cancer Maternal Grandmother     Lung cancer Maternal Grandfather     Cancer Maternal Grandfather         Lung cancer    Emphysema Maternal Grandfather     Asperger's syndrome Brother     Addiction problem Brother     No Known Problems Son     Varicose Veins Paternal Grandmother     Diabetes Paternal Grandfather     Depression Half-Sister     No Known Problems Brother     Colon cancer Neg Hx     Ovarian cancer Neg Hx      Social History     Socioeconomic History    Marital status: /Civil Union     Spouse name: Not on file    Number of children: Not on file    Years of education: Not on file    Highest education level: Not on file   Occupational History    Not on file   Tobacco Use    Smoking status: Never Smoker    Smokeless tobacco: Never Used   Vaping Use    Vaping Use: Never used   Substance and Sexual Activity    Alcohol use: Not Currently     Alcohol/week: 2 0 standard drinks     Types: 2 Glasses of wine per week    Drug use: No    Sexual activity: Yes     Partners: Male     Birth control/protection: OCP   Other Topics Concern    Not on file   Social History Narrative    Not on file     Social Determinants of Health     Financial Resource Strain: Not on file   Food Insecurity: Not on file   Transportation Needs: Not on file   Physical Activity: Not on file   Stress: Not on file   Social Connections: Not on file   Intimate Partner Violence: Not on file   Housing Stability: Not on file     Allergies   Allergen Reactions    Benadryl [Diphenhydramine] Hives       Current Outpatient Medications:     docusate sodium (COLACE) 100 mg capsule, Take 1 capsule (100 mg total) by mouth 2 (two) times a day as needed for constipation, Disp: 60 capsule, Rfl: 0    ibuprofen (MOTRIN) 600 mg tablet, Take 1 tablet (600 mg total) by mouth every 6 (six) hours as needed for mild pain, Disp: 60 tablet, Rfl: 1    Prenatal Vit-Fe Fumarate-FA (PRENATAL VITAMIN PO), , Disp: , Rfl:     sertraline (Zoloft) 50 mg tablet, Take 1 tablet (50 mg total) by mouth daily, Disp: 60 tablet, Rfl: 1    acetaminophen (TYLENOL) 500 mg tablet, Take 1 tablet (500 mg total) by mouth every 6 (six) hours as needed for mild pain (Patient not taking: No sig reported), Disp: , Rfl: 0    norethindrone (Ortho Micronor) 0 35 MG tablet, Take 1 tablet (0 35 mg total) by mouth daily (Patient not taking: Reported on 9/9/2022), Disp: 28 tablet, Rfl: 2    Review of Systems  Constitutional : as noted in HPI  ENT: no ear ache, no loss of hearing, no nosebleeds or nasal discharge, no sore throat or hoarseness  Cardiovascular: no complaints of slow or fast heart beat, no chest pain, no palpitations, no leg claudication or lower extremity edema  Respiratory: no complaints of shortness of shortness of breath, no SANDOVAL  Breasts:no complaints of breast pain, breast lump, or nipple discharge  Gastrointestinal: no complaints of abdominal pain, constipation, nausea, vomiting, or diarrhea or bloody stools  Genitourinary : no complaints of dysuria, incontinence, pelvic pain, no dysmenorrhea, vaginal discharge or abnormal vaginal bleeding and as noted in HPI  Musculoskeletal: no complaints of arthralgia, no myalgia, no joint swelling or stiffness, no limb pain or swelling    Integumentary: no complaints of skin rash or lesion, itching or dry skin  Neurological: no complaints of headache, no confusion, no numbness or tingling, no dizziness or fainting     Objective     /60   Ht 5' 6" (1 676 m)   Wt 80 9 kg (178 lb 6 4 oz)   LMP  (LMP Unknown)   BMI 28 79 kg/m²     General:   appears stated age, cooperative, alert normal mood and affect   Lungs: Unlabored breathing Skin normal skin turgor and no rashes     Psychiatric orientation to person, place, and time: normal  mood and affect: normal

## 2022-09-13 ENCOUNTER — NEW PATIENT (OUTPATIENT)
Dept: URBAN - METROPOLITAN AREA CLINIC 6 | Facility: CLINIC | Age: 22
End: 2022-09-13

## 2022-09-13 DIAGNOSIS — G43.B0: ICD-10-CM

## 2022-09-13 PROCEDURE — 92004 COMPRE OPH EXAM NEW PT 1/>: CPT

## 2022-09-13 PROCEDURE — 92083 EXTENDED VISUAL FIELD XM: CPT

## 2022-09-13 ASSESSMENT — VISUAL ACUITY
OS_CC: 20/20
OD_CC: 20/20-1
OU_CC: J1+

## 2022-09-13 ASSESSMENT — TONOMETRY
OD_IOP_MMHG: 16
OS_IOP_MMHG: 15

## 2022-09-21 ENCOUNTER — OFFICE VISIT (OUTPATIENT)
Dept: POSTPARTUM | Facility: CLINIC | Age: 22
End: 2022-09-21

## 2022-09-21 DIAGNOSIS — O92.79 PAINFUL LACTATION: Primary | ICD-10-CM

## 2022-09-21 NOTE — PROGRESS NOTES
BREAST FEEDING FOLLOW UP VISIT    Informant/Relationship: Annika/mom    Discussion of General Lactation Issues: Zakia Pagan still has a lot of pain with nursing  She has been mostly pumping due to the pain  Zakia Pagan has been offering the breast about 1-2 x/day  Nursing is spainful and Fidelina Fajardo does not come off the breast satisfied  Particia Tori, RN, IBCLC was the first person to suggest that his mouth and high arch could be contributing  Infant is 3months 3week old today  Interval Breastfeeding History:    Frequency of breast feeding: Once or twice daily  Does mother feel breastfeeding is effective: If no, explain: still seems hungry  Does infant appear satisfied after nursing:If no, explain: still seems hungry  Stooling pattern normal:Yes  Urinating frequently:Yes  Using shield or shells:Tried but wasn't helpful    Alternative/Artificial Feedings:   Bottle: Yes, using paced bottle feeding  Cup: No  Syringe/Finger: No           Formula Type: n/a                     Amount: n/a            Breast Milk:                      Amount: 5-6 oz            Frequency Q 3 Hr between feedings; will sleep 6 hours at night  Elimination Problems: No      Equipment:  Nipple Shield             Type: n/a             Size: n/a             Frequency of Use: n/a  Pump            Type: [place] Stride            Frequency of Use: every 3 hours; Samuel's demand just increased to closer to what Zakia Pagan is expressing, she has about 2-3 days worth of milk in the freezer  Shells            Type: n/a            Frequency of use: n/a    Equipment Problems: yes occasional vasospasm if not careful, can avoid this if she keeps her breasts/nipples warm while pumping      Mom:  Breast: Normal  Nipple Assessment in General: Normal: elongated/eraser, no discoloration and no damage noted  Mother's Awareness of Feeding Cues                 Recognizes:  Yes                  Verbalizes: Yes  Support System: FOB  History of Breastfeeding: Breast fed older child once, then pumped but never achieved full production  Changes/Stressors/Violence: Pain with nursing  Concerns/Goals: Annika wishes to provide her milk, she is would prefer to nurse directly but is unsure if she wishes to put [facility] through a procedure    Problems with Mom: Nursing associated pain    Physical Exam  Constitutional:       Appearance: Normal appearance  She is well-developed and normal weight  HENT:      Head: Normocephalic and atraumatic  Eyes:      Extraocular Movements: Extraocular movements intact  Neck:      Thyroid: No thyromegaly  Cardiovascular:      Rate and Rhythm: Normal rate and regular rhythm  Pulses: Normal pulses  Heart sounds: Normal heart sounds  No murmur heard  Pulmonary:      Effort: Pulmonary effort is normal       Breath sounds: Normal breath sounds  Musculoskeletal:      Cervical back: Normal range of motion and neck supple  Lymphadenopathy:      Cervical: No cervical adenopathy  Upper Body:      Right upper body: No pectoral adenopathy  Left upper body: No pectoral adenopathy  Neurological:      General: No focal deficit present  Mental Status: She is alert and oriented to person, place, and time  Psychiatric:         Mood and Affect: Mood normal          Behavior: Behavior normal          Thought Content: Thought content normal          Judgment: Judgment normal    Vitals and nursing note reviewed           Infant:  Behaviors: Alert  Color: Healthy  Birth weight: 3 91 kg  Current weight: 5 865 kg    Problems with infant: Restricted tongue movement      General Appearance:  Alert, active, no distress                             Head:  Normocephalic, AFOF, sutures opposed                             Eyes:  Conjunctiva clear, no drainage                              Ears:  Normally placed, no anomolies                             Nose:  Septum intact, no drainage or erythema                           Mouth:  No lesions; tongue extends to lower alveolar ridge, has limited lift, and poor lateralization bilaterally with prominence of the medial raphe and slight dimpling of the tip; there is moderate cupping of the examiner's finger with some peristalsis but more compression of the finger against the palate; frenulum extends from mid tongue to 3 mm below the upper edge of the lower alveolar ridge with a webbed insertion                    Neck:  Supple, symmetrical, trachea midline, no adenopathy; thyroid: no enlargement, symmetric, no tenderness/mass/nodules                 Respiratory:  No grunting, flaring, retractions, breath sounds clear and equal            Cardiovascular:  Regular rate and rhythm  No murmur  Adequate perfusion/capillary refill  Femoral pulse present                    Abdomen:   Soft, non-tender, no masses, bowel sounds present, no HSM             Genitourinary:  Normal male, testes descended, no discharge, swelling, or pain, anus patent                          Spine:   No abnormalities noted        Musculoskeletal:  Full range of motion          Skin/Hair/Nails:   Skin warm, dry, and intact, no rashes or abnormal dyspigmentation or lesions                Neurologic:   No abnormal movement, tone appropriate for gestational age    [place] Latch:  Efficiency:               Lips Flanged: Yes, better with gentle compression of the breast              Depth of latch: Good              Audible Swallow: Yes              Visible Milk: Yes              Wide Open/ Asymmetrical: Yes, better with gentle compression of the breast              Suck Swallow Cycle: Breathing: Unlabored, Coordinated: Yes  Nipple Assessment after latch: Normal: elongated/eraser, no discoloration and no damage noted  Latch Problems: Pablito Sevilla can be encouraged to achieve a wider, deeper latch with gentle compression of the breast and aligning him so that his lower lip and chin touch the breast with his nose just above the nipple  Nursing is still uncomfortable  Position:  Infant's Ergonomics/Body               Body Alignment: Yes               Head Supported: Yes               Close to Mom's body/ Lifted/ Supported: Yes               Mom's Ergonomics/Body: Yes                           Supported: Yes                           Sitting Back: Yes                           Brings Baby to her breast: Yes  Positioning Problems: None      Education:  Reviewed Latch: Reviewed how to gently compress the breast as if offering a sandwich to facilitate a deeper latch  Reviewed Positioning for Dyad: Reviewed how to bring baby to the breast so that his lower lip and chin touch the breast with his nose just above the nipple to encourage a wider, more asymmetric latch  Reviewed Frequency/Supply & Demand: Recommended feeding on demand: when the baby gives hunger cues, when the breasts feel full, every 3 hours during the day and every 5 hours at night counting from the beginning of one feeding to the beginning of the next; whichever comes first    Reviewed Alternative/Artificial Feedings: Continue paced bottle feeding        Plan:  Discussed history and physical exams with Annika  Support given for her commitment to providing breast milk for her baby  Discussed the findings on the baby's exam consistent with tongue tie and reviewed how this may be the cause of nipple trauma, nipple pain, nipple damage, poor milk transfer, blocked ducts, mastitis, and loss of milk production  Discussed the science that supports performing the frenotomy to improve latch  The benefits of a frenotomy extend beyond the decreased pain associated with nursing: longer breastfeeding has a decreased risk of ovarian and breast cancers for Annika, decreased risk of coronary artery disease, high blood pressure, and stroke  There are also significant medical benefits for the baby  Encouraged Annika to consider the frenotomy and return for additional lactation support and frenotomy as she wishes         I have spent 45 minutes with Patient  today in which greater than 50% of this time was spent in counseling/coordination of care regarding Prognosis, Risks and benefits of tx options, Intructions for management, Patient and family education and Impressions

## 2022-09-22 ENCOUNTER — ESTABLISHED (OUTPATIENT)
Dept: URBAN - METROPOLITAN AREA CLINIC 6 | Facility: CLINIC | Age: 22
End: 2022-09-22

## 2022-09-22 ENCOUNTER — SOCIAL WORK (OUTPATIENT)
Dept: BEHAVIORAL/MENTAL HEALTH CLINIC | Facility: CLINIC | Age: 22
End: 2022-09-22
Payer: COMMERCIAL

## 2022-09-22 DIAGNOSIS — F41.9 ANXIETY: Primary | ICD-10-CM

## 2022-09-22 DIAGNOSIS — Z01.00: ICD-10-CM

## 2022-09-22 PROCEDURE — 92012 INTRM OPH EXAM EST PATIENT: CPT

## 2022-09-22 PROCEDURE — 92015 DETERMINE REFRACTIVE STATE: CPT

## 2022-09-22 PROCEDURE — 90834 PSYTX W PT 45 MINUTES: CPT | Performed by: SOCIAL WORKER

## 2022-09-22 ASSESSMENT — VISUAL ACUITY
OS_CC: 20/20
OD_CC: 20/20

## 2022-09-23 ENCOUNTER — TELEPHONE (OUTPATIENT)
Dept: OBGYN CLINIC | Facility: MEDICAL CENTER | Age: 22
End: 2022-09-23

## 2022-09-23 ENCOUNTER — TELEPHONE (OUTPATIENT)
Dept: PSYCHIATRY | Facility: CLINIC | Age: 22
End: 2022-09-23

## 2022-09-23 NOTE — PSYCH
Psychotherapy Provided: Individual Psychotherapy 45 minutes     Length of time in session: 45 minutes, follow up in 2 week    Encounter Diagnosis     ICD-10-CM    1  Anxiety  F41 9    2  Post partum depression  F53 0        Goals addressed in session: Goal 1 : Reduce anxiety & depression    Pain:      none    0    Current suicide risk : Low         Behavioral Health Treatment Plan  Luke: Diagnosis and Treatment Plan explained to Misbah Bolton relates understanding diagnosis and is agreeable to Treatment Plan  Yes     Assessment/Plan:      Diagnoses and all orders for this visit:    Anxiety    Post partum depression          Subjective:  Pt presented for follow up therapy session  Met with pt from 1400 - 1440  Pt reported that she is "doing okay "  She started Zoloft almost 2 weeks ago and she is beginning to feel a little better  She is not experiencing any self-harm thoughts  She continues to feel anxious about the state of the house as they continue the renovations  Pt reported the baby is feeding every 3 hours now which give pt more time to do other things  She continues to pump and is not really offering her breast as it is too painful  Pt did an online referral for Laurie Mcgee to receive services from [facility]  Pt has been taking Laurie Mcgee to her MIL's house once a week for a sleep over  This allows pt to have more 1:1 time with the baby  Pt is due to return to work in a few weeks and she will be calling HR to request extended leave until about Jan or Feb   Pt reported that things with her  are a little better and they are both working on their communication with each other  Encouraged pt to continue to utilize coping skills and get rest      Patient ID: Ricco Marino is a 19 y o  female  HPI    Review of Systems  : Pt was pleasant, cooperative and engaged      Objective:     Physical Exam

## 2022-09-23 NOTE — TELEPHONE ENCOUNTER
Patient calling in regards to [facility]  Patient has PP f/u appt 9/28 but would like to discuss about an extension  Patient wasn't sure if we could extend it or if she needed to talk to her therapist  Please advise

## 2022-09-23 NOTE — TELEPHONE ENCOUNTER
Pt phoned in to speak with the provider about paper work she needs for her medical leave  Please reach out to her when you can  Thank you

## 2022-09-28 ENCOUNTER — OFFICE VISIT (OUTPATIENT)
Dept: OBGYN CLINIC | Facility: CLINIC | Age: 22
End: 2022-09-28
Payer: COMMERCIAL

## 2022-09-28 VITALS
WEIGHT: 178 LBS | BODY MASS INDEX: 28.61 KG/M2 | HEIGHT: 66 IN | DIASTOLIC BLOOD PRESSURE: 74 MMHG | SYSTOLIC BLOOD PRESSURE: 120 MMHG

## 2022-09-28 DIAGNOSIS — F32.A DEPRESSIVE DISORDER: Primary | ICD-10-CM

## 2022-09-28 PROCEDURE — 99212 OFFICE O/P EST SF 10 MIN: CPT | Performed by: OBSTETRICS & GYNECOLOGY

## 2022-09-28 NOTE — PROGRESS NOTES
Assessment Fabien Melton was seen today for follow-up  Diagnoses and all orders for this visit:    Depressive disorder         Plan  Improved EDPS score and patient demeanor   Encouraged to continue close contact with therapist and continue medication  If SI or HI we discussed going to ER   Will follow up in 3 weeks   Nunu Figueroa is a 19 y o  female here for a follow up visit   States has been feeling a little better  Still anxious but from a depression standpoint doing better, she has had thoughts of hurting herself but does not feel she would and does not have plan  Desires to continue medication zoloft for now  Does have some headaches on it but has only been taking it for 2 weeks      Patient Active Problem List   Diagnosis    COVID-19 affecting pregnancy in first trimester    [facility]ipolar depression ([facility] [address]    Depressive disorder    Insomnia    39 weeks gestation of pregnancy    Depression during pregnancy in third trimester    Anemia during pregnancy in third trimester    Migraine headache    Prior fetal macrosomia in third trimester, antepartum    Vaginal discharge during pregnancy     (spontaneous vaginal delivery)    At risk for postpartum depression       Gynecologic History  No LMP recorded (lmp unknown)        Past Medical History:   Diagnosis Date    Depression     History of transfusion         Migraine headache 2022    Varicella     vaccine     Past Surgical History:   Procedure Laterality Date    SKIN GRAFT Left     left elbow    SKIN GRAFT      left elbow     Family History   Problem Relation Age of Onset    Hypothyroidism Mother     Bipolar disorder Mother     Mental illness Mother     Thyroid disease Mother         Hypothyroidism    Schizophrenia Mother     No Known Problems Father     Cancer Maternal Grandmother         Breast cancer    Heart disease Maternal Grandmother     Breast cancer Maternal Grandmother     Lung cancer Maternal Grandfather  Cancer Maternal Grandfather         Lung cancer    Emphysema Maternal Grandfather     Asperger's syndrome Brother     Addiction problem Brother     No Known Problems Son     Varicose Veins Paternal Grandmother     Diabetes Paternal Grandfather     Depression Half-Sister     No Known Problems Brother     Colon cancer Neg Hx     Ovarian cancer Neg Hx      Social History     Socioeconomic History    Marital status: /Civil Union     Spouse name: Not on file    Number of children: Not on file    Years of education: Not on file    Highest education level: Not on file   Occupational History    Not on file   Tobacco Use    Smoking status: Never Smoker    Smokeless tobacco: Never Used   Vaping Use    Vaping Use: Never used   Substance and Sexual Activity    Alcohol use: Not Currently     Alcohol/week: 2 0 standard drinks     Types: 2 Glasses of wine per week    Drug use: No    Sexual activity: Yes     Partners: Male     Birth control/protection: OCP   Other Topics Concern    Not on file   Social History Narrative    Not on file     Social Determinants of Health     Financial Resource Strain: Not on file   Food Insecurity: Not on file   Transportation Needs: Not on file   Physical Activity: Not on file   Stress: Not on file   Social Connections: Not on file   Intimate Partner Violence: Not on file   Housing Stability: Not on file     Allergies   Allergen Reactions    Benadryl [Diphenhydramine] Hives       Current Outpatient Medications:     docusate sodium (COLACE) 100 mg capsule, Take 1 capsule (100 mg total) by mouth 2 (two) times a day as needed for constipation, Disp: 60 capsule, Rfl: 0    ibuprofen (MOTRIN) 600 mg tablet, Take 1 tablet (600 mg total) by mouth every 6 (six) hours as needed for mild pain, Disp: 60 tablet, Rfl: 1    norethindrone (Ortho Micronor) 0 35 MG tablet, Take 1 tablet (0 35 mg total) by mouth daily, Disp: 28 tablet, Rfl: 2    Prenatal Vit-Fe Fumarate-FA (PRENATAL VITAMIN PO), , Disp: , Rfl:     sertraline (Zoloft) 50 mg tablet, Take 1 tablet (50 mg total) by mouth daily, Disp: 60 tablet, Rfl: 1    acetaminophen (TYLENOL) 500 mg tablet, Take 1 tablet (500 mg total) by mouth every 6 (six) hours as needed for mild pain (Patient not taking: No sig reported), Disp: , Rfl: 0    Review of Systems  Constitutional :no fever, feels well, no tiredness, no recent weight gain or loss  ENT: no ear ache, no loss of hearing, no nosebleeds or nasal discharge, no sore throat or hoarseness  Cardiovascular: no complaints of slow or fast heart beat, no chest pain, no palpitations, no leg claudication or lower extremity edema  Respiratory: no complaints of shortness of shortness of breath, no SANDOVAL  Breasts:no complaints of breast pain, breast lump, or nipple discharge  Gastrointestinal: no complaints of abdominal pain, constipation, nausea, vomiting, or diarrhea or bloody stools  Genitourinary : no complaints of dysuria, incontinence, pelvic pain, no dysmenorrhea, vaginal discharge or abnormal vaginal bleeding and as noted in HPI  Musculoskeletal: no complaints of arthralgia, no myalgia, no joint swelling or stiffness, no limb pain or swelling  Integumentary: no complaints of skin rash or lesion, itching or dry skin  Neurological: no complaints of headache, no confusion, no numbness or tingling, no dizziness or fainting     Objective     /74   Ht 5' 6" (1 676 m)   Wt 80 7 kg (178 lb)   LMP  (LMP Unknown)   BMI 28 73 kg/m²     General:   appears stated age, cooperative, alert normal mood and affect   Skin normal skin turgor and no rashes     Psychiatric orientation to person, place, and time: normal  mood and affect: normal       EDPS 15 down from 23

## 2022-09-29 ENCOUNTER — TELEMEDICINE (OUTPATIENT)
Dept: BEHAVIORAL/MENTAL HEALTH CLINIC | Facility: CLINIC | Age: 22
End: 2022-09-29
Payer: COMMERCIAL

## 2022-09-29 DIAGNOSIS — F41.9 ANXIETY: Primary | ICD-10-CM

## 2022-09-29 PROCEDURE — 90834 PSYTX W PT 45 MINUTES: CPT | Performed by: SOCIAL WORKER

## 2022-09-29 NOTE — PSYCH
Virtual Regular Visit    Verification of patient location:    Patient is located in the following state in which I hold an active license [place]      Assessment/Plan:    Problem List Items Addressed This Visit    None     Visit Diagnoses     Anxiety    -  Primary    Post partum depression              Goals addressed in session: Goal 1 : Reduce anxiety & depression         Reason for visit is No chief complaint on file  Encounter provider Tracy Reed LCSW    Provider located at [address] [id number] [phone]      Recent Visits  No visits were found meeting these conditions  Showing recent visits within past 7 days and meeting all other requirements  Today's Visits  Date Type Provider Dept   09/29/22 Telemedicine Tracy Reed LCSW [facility]   Showing today's visits and meeting all other requirements  Future Appointments  No visits were found meeting these conditions  Showing future appointments within next 150 days and meeting all other requirements       The patient was identified by name and date of birth  Yaima Jeet was informed that this is a telemedicine visit and [facility] and patient was informed that this is a secure, HIPAA-compliant platform  She agrees to proceed     My office door was closed  No one else was in the room  She acknowledged consent and understanding of privacy and security of the video platform  The patient has agreed to participate and understands they can discontinue the visit at any time  Patient is aware this is a billable service  Phillip Benites is a 19 y o  female who presents for follow up therapy session  Met with pt from [id number] - [id number]    When asked how pt was doing she responded, "could be better "  Pt explained that she got upset one night recently and had the urge to cut  She did not go through with it but she is concerned that she had this thought  She acknowledged that she tends to feel this way when she and her  get into an argument  Explained the rubberband method to pt and she will try to implement this when she gets upset  Overall, her depression is manageable however she continues to feel anxious  She has been having "crazy dreams" about the family getting attacked by animals, spiders, etc  She understands this is related to her anxiety and does not stress about it too much  Pt admits she has not been eating very healthy lately and was online doing some research about how much protein, carbs, veggies, and fruits she should be getting especially since she is breastfeeding  She developed an outline and will also resume drinking the protein shakes for lactating mothers  Pt has also found that if she stays up after the baby's 3am bottle, she will get about 1 5 hours or so to herself to get things done and then the rest of the day seems to flow better  Pt's leave from work has been extended through January 2, 2023  Processed pt's feelings and encouraged pt to utilize the rubberband method  Developed treatment plan this session as well         HPI     Past Medical History:   Diagnosis Date    Depression 2011    History of transfusion     2014    Migraine headache 5/24/2022    Varicella     vaccine       Past Surgical History:   Procedure Laterality Date    SKIN GRAFT Left     left elbow    SKIN GRAFT      left elbow       Current Outpatient Medications   Medication Sig Dispense Refill    acetaminophen (TYLENOL) 500 mg tablet Take 1 tablet (500 mg total) by mouth every 6 (six) hours as needed for mild pain (Patient not taking: No sig reported)  0    docusate sodium (COLACE) 100 mg capsule Take 1 capsule (100 mg total) by mouth 2 (two) times a day as needed for constipation 60 capsule 0    ibuprofen (MOTRIN) 600 mg tablet Take 1 tablet (600 mg total) by mouth every 6 (six) hours as needed for mild pain 60 tablet 1    norethindrone (Ortho Micronor) 0 35 MG tablet Take 1 tablet (0 35 mg total) by mouth daily 28 tablet 2    Prenatal Vit-Fe Fumarate-FA (PRENATAL VITAMIN PO)       sertraline (Zoloft) 50 mg tablet Take 1 tablet (50 mg total) by mouth daily 60 tablet 1     No current facility-administered medications for this visit  Allergies   Allergen Reactions    Benadryl [Diphenhydramine] Hives       Review of Systems: Pt was pleasant, cooperative and engaged  Video Exam    There were no vitals filed for this visit      Physical Exam     I spent 30 minutes directly with the patient during this visit

## 2022-09-29 NOTE — BH TREATMENT PLAN
[facility] Treatment Plan    Manolo Clark  2000  Date of Treatment Plan: 2022    Treatment Goals (after each item selected, indicate outcome measures; (ie: as evidenced by)    [] Reduce Risk Factors of:    [x] Reduce Major Symptoms of: Depression & Anxiety   [] Ameliorate Functional Impairments of:    [x] Develop Coping Strategies to Address Stress of: Caring for  and toddler   [] Stabilize (short term) Crisis of:   [] Maintain (long term) Stabilization of Symptoms of   [] Medication Referral to:   [] Maintain Sobriety:     Planned Interventions- Patient Participation (must be consistent with treatment goals):    [] Assertiveness Training [x] Problem Solving Skills Training   [] Anger Management [x] Solution Focused Techniques   [] Affect Identification and Expression [x] Stress Management   [] Cognitive Restructuring [x] Supportive Therapy   [x] Communication Training  [] Self/Other Throckmorton Training    [] Grief Work  [] Decision Options Exploration   [] Imagery/Relaxation Training [] Decision Option Exploration   [] Parent Training  [] Pattern Identification and Interruption       [] Engage Significant Others in Treatment:   [] Facilitate Decision Making Regarding:   [] Explore/Monitor:    [x] Teach Skills of: Stress reduction   [] Educate Regarding:   [] Assign Readings:   [] Referrals Planned:   [x] Use of Resources/Strengths: Support system   [] Preventative Strategies:   [] Obstacles to Change:     Estimated Time Frames:     Goal 1: 23   Goal 2: 23    I have been provided education on my primary diagnosis of: Depression & Anxiety        Manolo Clark, 2000, actively participated in the creation of this treatment plan during a virtual session, using the [employer] platform  Manolo Clark  provided verbal consent on 2022 at 2:11 PM  The treatment plan was transcribed into the [facility] Record at a later time               My therapist and I have developed this plan together, and I am in agreement to working on these issues and goals  I understand the plan that has been developed for my treatment  [x] Patient Declined copy of treatment plan

## 2022-10-12 ENCOUNTER — CONSULT (OUTPATIENT)
Dept: DERMATOLOGY | Facility: CLINIC | Age: 22
End: 2022-10-12

## 2022-10-12 VITALS — TEMPERATURE: 97.1 F | BODY MASS INDEX: 30.22 KG/M2 | HEIGHT: 66 IN | WEIGHT: 188 LBS

## 2022-10-12 DIAGNOSIS — D22.60 MULTIPLE BENIGN MELANOCYTIC NEVI OF UPPER AND LOWER EXTREMITIES AND TRUNK: ICD-10-CM

## 2022-10-12 DIAGNOSIS — D22.70 MULTIPLE BENIGN MELANOCYTIC NEVI OF UPPER AND LOWER EXTREMITIES AND TRUNK: ICD-10-CM

## 2022-10-12 DIAGNOSIS — D22.9 CHANGE IN MULTIPLE NEVI: ICD-10-CM

## 2022-10-12 DIAGNOSIS — D22.5 MULTIPLE BENIGN MELANOCYTIC NEVI OF UPPER AND LOWER EXTREMITIES AND TRUNK: ICD-10-CM

## 2022-10-12 DIAGNOSIS — D48.5 NEOPLASM OF UNCERTAIN BEHAVIOR OF SKIN: Primary | ICD-10-CM

## 2022-10-12 PROCEDURE — 88305 TISSUE EXAM BY PATHOLOGIST: CPT | Performed by: PATHOLOGY

## 2022-10-12 PROCEDURE — 88342 IMHCHEM/IMCYTCHM 1ST ANTB: CPT | Performed by: PATHOLOGY

## 2022-10-12 PROCEDURE — 88344 IMHCHEM/IMCYTCHM EA MLT ANTB: CPT | Performed by: PATHOLOGY

## 2022-10-12 NOTE — PATIENT INSTRUCTIONS
MELANOCYTIC NEVI ("Moles")    Assessment and Plan:  Based on a thorough discussion of this condition and the management approach to it (including a comprehensive discussion of the known risks, side effects and potential benefits of treatment), the patient (family) agrees to implement the following specific plan:  Reassured benign     Melanocytic Nevi  Melanocytic nevi ("moles") are caused by collections of the color producing skin cells, or melanocytes, in 1 area in the skin  They can range in color from pink to dark brown and be either raised or flat  Some moles are present at birth (I e , "congenital nevi"), while others come up later in life (i e , "acquired nevi")  Bernis Lazier exposure also stimulates the body to make more moles, ie the more sun you get the more moles you'll grow  Clinically distinguishing a healthy mole from melanoma may be difficult  The "ABCDE's" of moles have been suggested as a means of helping to alert a person to a suspicious mole and the possible increased risk of melanoma  Asymmetry: Healthy moles tend to be symmetric, while melanomas are often asymmetric  Asymmetry means if you draw a line through the mole, the two halves do not match in color, size, shape, or surface texture Any mole that starts to demonstrate "asymmetry" should be examined promptly by a board certified dermatologist      Border: Healthy moles tend to have discrete, even borders  The border of a melanoma often blends into the normal skin and does not sharply delineate the mole from normal skin  Any mole that starts to demonstrate "uneven borders" should be examined promptly     Color: Healthy moles tend to be one color throughout  Melanomas tend to be made up of different colors ranging from dark black, blue, white, or red    Any mole that demonstrates a color change should be examined promptly    Diameter: Healthy moles tend to be smaller than 0 6 cm in size; an exception are "congenital nevi" that can be larger  Melanomas tend to grow and can often be greater than 0 6 cm (1/4 of an inch, or the size of a pencil eraser)  This is only a guideline, and many normal moles may be larger than 0 6 cm without being unhealthy  Any mole that starts to change in size (small to bigger or bigger to smaller) should be examined promptly    Evolving: Healthy moles tend to "stay the same "  Melanomas may often show signs of change or evolution such as a change in size, shape, color, or elevation  Any mole that starts to itch, bleed, crust, burn, hurt, or ulcerate or demonstrate a change or evolution should be examined promptly by a board certified dermatologist       What are atypical moles or dysplastic nevi? Dysplastic moles are moles that have some of the ABCDE  changes listed above but  are not cancerous  Sometimes a biopsy and microscopic examination are needed to determine the difference  They may indicate an increased risk of melanoma in that person, especially if there is a family history of melanoma  What is a Melanoma? The main concern when looking at a new or changing mole it to evaluate whether it may be a melanoma  The appearance of a "new mole" remains one of the most reliable methods for identifying a malignant melanoma  A melanoma is a type of skin cancer that can be deadly if it spreads throughout the body  The prognosis of a Melanoma depends on how deep it has penetrated in the skin  If caught early, they generally will not have had time to grow into the deeper layers of the skin and they cure rate is then very high  Once the melanoma grows deeper into the skin, the cure rate drops dramatically  Therefore, early detection and removal of a malignant melanoma results in a much better chance of complete cure       NEOPLASM OF UNCERTAIN BEHAVIOR OF SKIN    Assessment and Plan:  I have discussed with the patient that a sample of skin via a "skin biopsy” would be potentially helpful to further make a specific diagnosis under the microscope  Based on a thorough discussion of this condition and the management approach to it (including a comprehensive discussion of the known risks, side effects and potential benefits of treatment), the patient (family) agrees to implement the following specific plan:    Procedure:  Skin Biopsy  After a thorough discussion of treatment options and risk/benefits/alternatives (including but not limited to local pain, scarring, dyspigmentation, blistering, possible superinfection, and inability to confirm a diagnosis via histopathology), verbal and written consent were obtained and portion of the rash was biopsied for tissue sample  See below for consent that was obtained from patient and subsequent Procedure Note  INFORMED CONSENT DISCUSSION AND POST-OPERATIVE INSTRUCTIONS FOR PATIENT    I   RATIONALE FOR PROCEDURE  I understand that a skin biopsy allows the Dermatologist to test a lesion or rash under the microscope to obtain a diagnosis  It usually involves numbing the area with numbing medication and removing a small piece of skin; sometimes the area will be closed with sutures  In this specific procedure, sutures are not usually needed  If any sutures are placed, then they are usually need to be removed in 2 weeks or less  I understand that my Dermatologist recommends that a skin "shave" biopsy be performed today  A local anesthetic, similar to the kind that a dentist uses when filling a cavity, will be injected with a very small needle into the skin area to be sampled  The injected skin and tissue underneath "will go to sleep” and become numb so no pain should be felt afterwards  An instrument shaped like a tiny "razor blade" (shave biopsy instrument) will be used to cut a small piece of tissue and skin from the area so that a sample of tissue can be taken and examined more closely under the microscope    A slight amount of bleeding will occur, but it will be stopped with direct pressure and a pressure bandage and any other appropriate methods  I understands that a scar will form where the wound was created  Surgical ointment will be applied to help protect the wound  Sutures are not usually needed  II   RISKS AND POTENTIAL COMPLICATIONS   I understand the risks and potential complications of a skin biopsy include but are not limited to the following:  Bleeding  Infection  Pain  Scar/keloid  Skin discoloration  Incomplete Removal  Recurrence  Nerve Damage/Numbness/Loss of Function  Allergic Reaction to Anesthesia  Biopsies are diagnostic procedures and based on findings additional treatment or evaluation may be required  Loss or destruction of specimen resulting in no additional findings    My Dermatologist has explained to me the nature of the condition, the nature of the procedure, and the benefits to be reasonably expected compared with alternative approaches  My Dermatologist has discussed the likelihood of major risks or complications of this procedure including the specific risks listed above, such as bleeding, infection, and scarring/keloid  I understand that a scar is expected after this procedure  I understand that my physician cannot predict if the scar will form a "keloid," which extends beyond the borders of the wound that is created  A keloid is a thick, painful, and bumpy scar  A keloid can be difficult to treat, as it does not always respond well to therapy, which includes injecting cortisone directly into the keloid every few weeks  While this usually reduces the pain and size of the scar, it does not eliminate it  I understand that photographs may be taken before and after the procedure  These will be maintained as part of the medical providers confidential records and may not be made available to me    I further authorize the medical provider to use the photographs for teaching purposes or to illustrate scientific papers, books, or lectures if in his/her judgment, medical research, education, or science may benefit from its use  I have had an opportunity to fully inquire about the risks and benefits of this procedure and its alternatives  I have been given ample time and opportunity to ask questions and to seek a second opinion if I wished to do so  I acknowledge that there have specifically been no guarantees as to the cosmetic results from the procedure  I am aware that with any procedure there is always the possibility of an unexpected complication  III  POST-PROCEDURAL CARE (WHAT YOU WILL NEED TO DO "AFTER THE BIOPSY" TO OPTIMIZE HEALING)    Keep the area clean and dry  Try NOT to remove the bandage or get it wet for the first 24 hours  Gently clean the area and apply surgical ointment (such as Vaseline petrolatum ointment, which is available "over the counter" and not a prescription) to the biopsy site for up to 2 weeks straight  This acts to protect the wound from the outside world  Sutures are not usually placed in this procedure  If any sutures were placed, return for suture removal as instructed (generally 1 week for the face, 2 weeks for the body)  Take Acetaminophen (Tylenol) for discomfort, if no contraindications  Ibuprofen or aspirin could make bleeding worse  Call our office immediately for signs of infection: fever, chills, increased redness, warmth, tenderness, discomfort/pain, or pus or foul smell coming from the wound  WHAT TO DO IF THERE IS ANY BLEEDING? If a small amount of bleeding is noticed, place a clean cloth over the area and apply firm pressure for ten minutes  Check the wound after 10 minutes of direct pressure  If bleeding persists, try one more time for an additional 10 minutes of direct pressure on the area    If the bleeding becomes heavier or does not stop after the second attempt, or if you have any other questions about this procedure, then please call your [facility] Dermatologist by calling [phone] (SKIN)  I hereby acknowledge that I have reviewed and verified the site with my Dermatologist and have requested and authorized my Dermatologist to proceed with the procedure

## 2022-10-12 NOTE — PROGRESS NOTES
[facility] Note     Patient Name: Henrietta Kendall  Encounter Date: 10/12/22    • Have you been cared for by a [facility] in the last 3 years and, if so, which one? No    · Have you traveled outside of the [address] [place] [address] in the past 3 months or outside of the [facility] area in the last 2 weeks? No    • May we call your Preferred Phone number to discuss your specific medical information? Yes    • May we leave a detailed message that includes your specific medical information? Yes      Today's Chief Concerns:  • Concern #1:  Skin check      Past Medical History:  Have you personally ever had or currently have any of the following? · Skin cancer (such as Melanoma, Basal Cell Carcinoma, Squamous Cell Carcinoma? (If Yes, please provide more detail)- No  · Eczema: No  · Psoriasis: YES  · HIV/AIDS: No  · Hepatitis B or C: No  · Tuberculosis: No  · Systemic Immunosuppression such as Diabetes, Biologic or Immunotherapy, Chemotherapy, Organ Transplantation, Bone Marrow Transplantation (If YES, please provide more detail): No  · Radiation Treatment (If YES, please provide more detail): No  · Any other major medical conditions/concerns? (If Yes, which types)- No    Social History:    • What is/was your primary occupation? • What are your hobbies/past-times? Family History:  Have any of your "first degree relatives" (parent, brother, sister, or child) had any of the following       · Skin cancer such as Melanoma or Merkel Cell Carcinoma or Pancreatic Cancer? No  · Eczema, Asthma, Hay Fever or Seasonal Allergies: No  · Psoriasis or Psoriatic Arthritis: No  · Do any other medical conditions seem to run in your family? If Yes, what condition and which relatives?   No    Current Medications:         Current Outpatient Medications:   •  ibuprofen (MOTRIN) 600 mg tablet, Take 1 tablet (600 mg total) by mouth every 6 (six) hours as needed for mild pain, Disp: 60 tablet, Rfl: 1  •  norethindrone (Ortho Micronor) 0 35 MG tablet, Take 1 tablet (0 35 mg total) by mouth daily, Disp: 28 tablet, Rfl: 2  •  Prenatal Vit-Fe Fumarate-FA (PRENATAL VITAMIN PO), , Disp: , Rfl:   •  sertraline (Zoloft) 50 mg tablet, Take 1 tablet (50 mg total) by mouth daily, Disp: 60 tablet, Rfl: 1  •  acetaminophen (TYLENOL) 500 mg tablet, Take 1 tablet (500 mg total) by mouth every 6 (six) hours as needed for mild pain (Patient not taking: No sig reported), Disp: , Rfl: 0  •  docusate sodium (COLACE) 100 mg capsule, Take 1 capsule (100 mg total) by mouth 2 (two) times a day as needed for constipation, Disp: 60 capsule, Rfl: 0      Review of Systems:  Have you recently had or currently have any of the following? If YES, what are you doing for the problem? · Fever, chills or unintended weight loss: No  · Sudden loss or change in your vision: No  · Nausea, vomiting or blood in your stool: No  · Painful or swollen joints: No  · Wheezing or cough: No  · Changing mole or non-healing wound: YES  · Nosebleeds: No  · Excessive sweating: No  · Easy or prolonged bleeding? No  · Over the last 2 weeks, how often have you been bothered by the following problems? · Taking little interest or pleasure in doing things: 1 - Not at All  · Feeling down, depressed, or hopeless: 1 - Not at All  · Rapid heartbeat with epinephrine:  No    · FEMALES ONLY:    · Are you pregnant or planning to become pregnant? No  · Are you currently or planning to be nursing or breast feeding? YES    · Any known allergies? Allergies   Allergen Reactions   • Benadryl [Diphenhydramine] Hives         Physical Exam:    • Was a chaperone (Derm Clinical Assistant) present throughout the entire Physical Exam? Yes    • Did the Dermatology Team specifically  the patient on the importance of a Full Skin Exam to be sure that nothing is missed clinically?  Yes}  o Did the patient ultimately request or accept a Full Skin Exam?  Yes  o Did the patient specifically refuse to have the areas "under-the-bra" examined by the Dermatologist? No  o Did the patient specifically refuse to have the areas "under-the-underwear" examined by the Dermatologist? No    CONSTITUTIONAL:   Vitals:    10/12/22 1403   Temp: (!) 97 1 °F (36 2 °C)   TempSrc: Temporal   Weight: 85 3 kg (188 lb)   Height: 5' 6" (1 676 m)       PSYCH: Normal mood and affect  EYES: Normal conjunctiva  ENT: Normal lips and oral mucosa  CARDIOVASCULAR: No edema  RESPIRATORY: Normal respirations  HEME/LYMPH/IMMUNO:  No regional lymphadenopathy except as noted below in "ASSESSMENT AND PLAN BY DIAGNOSIS"    SKIN:  FULL ORGAN SYSTEM EXAM  Hair, Scalp, Ears, Face Normal except as noted below in Assessment   Neck, Cervical Chain Nodes Normal except as noted below in Assessment   Right Arm/Hand/Fingers Normal except as noted below in Assessment   Left Arm/Hand/Fingers Normal except as noted below in Assessment   Chest/Breasts/Axillae Viewed areas Normal except as noted below in Assessment   Abdomen, Umbilicus Normal except as noted below in Assessment   Back/Spine Normal except as noted below in Assessment   Buttocks Normal except as noted below in Assessment   Right Leg, Foot, Toes Normal except as noted below in Assessment   Left Leg, Foot, Toes Normal except as noted below in Assessment        Assessment and Plan by Diagnosis:    History of Present Condition:    • Duration:  How long has this been an issue for you?    o  whole life  • Location Affected:  Where on the body is this affecting you?    o  body  • Quality:  Is there any bleeding, pain, itch, burning/irritation, or redness associated with the skin lesion? o  denies  • Severity:  Describe any bleeding, pain, itch, burning/irritation, or redness on a scale of 1 to 10 (with 10 being the worst)  o  0  • Timing:  Does this condition seem to be there pretty constantly or do you notice it more at specific times throughout the day?     o constant  • Context:  Have you ever noticed that this condition seems to be associated with specific activities you do?    o  denies  • Modifying Factors:    o Anything that seems to make the condition worse?    -  pregnancy  o What have you tried to do to make the condition better?    -  denies  • Associated Signs and Symptoms:  Does this skin lesion seem to be associated with any of the following:      MELANOCYTIC NEVI ("Moles")    Physical Exam:  • Anatomic Location Affected:  Face, neck, torso, extremities, left hand, left foot   • Morphological Description:      - Left palm 2 mm brown macule with a furrow pattern on the ELM  o Scattered, 1-4mm round to ovoid, symmetrical-appearing, even bordered, skin colored to dark brown macules/papules, mostly in sun-exposed areas  ELM without concerning features with exception of lesion that is biopsied today  Pictures taken for review at next exam of lesions patient feels may have changed (grown in size slightly); these lesions have reassuring features on ELM  • Pertinent Positives:  • Pertinent Negatives: Additional History of Present Condition:  Present for years; patient noted that after having first child noticed some  Slight change of moles  Assessment and Plan:  Based on a thorough discussion of this condition and the management approach to it (including a comprehensive discussion of the known risks, side effects and potential benefits of treatment), the patient (family) agrees to implement the following specific plan:  • Reassured benign  • Offered biopsy versus monitoring of lesions photographed (right upper thigh, left upper arm) that patient notes may have grown  Patient elects to monitor with photographs     Melanocytic Nevi  Melanocytic nevi ("moles") are caused by collections of the color producing skin cells, or melanocytes, in 1 area in the skin  They can range in color from pink to dark brown and be either raised or flat        Some moles are present at birth (I e , "congenital nevi"), while others come up later in life (i e , "acquired nevi")  Zasaraiie Rook exposure also stimulates the body to make more moles, ie the more sun you get the more moles you'll grow  Clinically distinguishing a healthy mole from melanoma may be difficult  The "ABCDE's" of moles have been suggested as a means of helping to alert a person to a suspicious mole and the possible increased risk of melanoma  Asymmetry: Healthy moles tend to be symmetric, while melanomas are often asymmetric  Asymmetry means if you draw a line through the mole, the two halves do not match in color, size, shape, or surface texture Any mole that starts to demonstrate "asymmetry" should be examined promptly by a board certified dermatologist      Border: Healthy moles tend to have discrete, even borders  The border of a melanoma often blends into the normal skin and does not sharply delineate the mole from normal skin  Any mole that starts to demonstrate "uneven borders" should be examined promptly     Color: Healthy moles tend to be one color throughout  Melanomas tend to be made up of different colors ranging from dark black, blue, white, or red  Any mole that demonstrates a color change should be examined promptly    Diameter: Healthy moles tend to be smaller than 0 6 cm in size; an exception are "congenital nevi" that can be larger  Melanomas tend to grow and can often be greater than 0 6 cm (1/4 of an inch, or the size of a pencil eraser)  This is only a guideline, and many normal moles may be larger than 0 6 cm without being unhealthy  Any mole that starts to change in size (small to bigger or bigger to smaller) should be examined promptly    Evolving: Healthy moles tend to "stay the same "  Melanomas may often show signs of change or evolution such as a change in size, shape, color, or elevation    Any mole that starts to itch, bleed, crust, burn, hurt, or ulcerate or demonstrate a change or evolution should be examined promptly by a board certified dermatologist       What are atypical moles or dysplastic nevi? Dysplastic moles are moles that have some of the ABCDE  changes listed above but  are not cancerous  Sometimes a biopsy and microscopic examination are needed to determine the difference  They may indicate an increased risk of melanoma in that person, especially if there is a family history of melanoma  What is a Melanoma? The main concern when looking at a new or changing mole it to evaluate whether it may be a melanoma  The appearance of a "new mole" remains one of the most reliable methods for identifying a malignant melanoma  A melanoma is a type of skin cancer that can be deadly if it spreads throughout the body  The prognosis of a Melanoma depends on how deep it has penetrated in the skin  If caught early, they generally will not have had time to grow into the deeper layers of the skin and they cure rate is then very high  Once the melanoma grows deeper into the skin, the cure rate drops dramatically  Therefore, early detection and removal of a malignant melanoma results in a much better chance of complete cure  NEOPLASM OF UNCERTAIN BEHAVIOR OF SKIN    Physical Exam:  • (Anatomic Location); (Size and Morphological Description); (Differential Diagnosis):  o Left lateral foot: 0 8 Cm brown papule with globular pigmentation with white shiney areas on ELM  • Pertinent Positives:  • Pertinent Negatives: Additional History of Present Condition:  Present for about 3 years noticed change with pregnancy and ongoing change (size and color)    Assessment and Plan:  • I have discussed with the patient that a sample of skin via a "skin biopsy” would be potentially helpful to further make a specific diagnosis under the microscope    • Based on a thorough discussion of this condition and the management approach to it (including a comprehensive discussion of the known risks, side effects and potential benefits of treatment), the patient (family) agrees to implement the following specific plan:    o Procedure:  Skin Biopsy  After a thorough discussion of treatment options and risk/benefits/alternatives (including but not limited to local pain, scarring, dyspigmentation, blistering, possible superinfection, and inability to confirm a diagnosis via histopathology), verbal and written consent were obtained and portion of the rash was biopsied for tissue sample  See below for consent that was obtained from patient and subsequent Procedure Note  PROCEDURE TANGENTIAL (SHAVE) BIOPSY NOTE:    • Performing Physician: Dr Terese Whitman  • Anatomic Location; Clinical Description with size (cm); Pre-Op Diagnosis:   ATTENTION:  DERMPATH GROUP    SPECIMEN A; Skin; Anatomic Location: Left lateral foot; Procedure/Protocol: Skin Specimen (submit in [facility]):Tangential Biopsy (includes shave, scoop, saucerization, curette) (CPT 77396; each additional tangential biopsy is CPT 51788)   19y o  year old  Female with a Morphological Description:  0 8 Cm brown papule with linerer pigmentation with white shiney areas on ELM  Differential Diagnosis and/or Specific Clinical Question: Rule out: atypia  • Post-op diagnosis: Same     • Local anesthesia: 2% Lidocaine  HCL     • Topical anesthesia: None    • Hemostasis: Aluminum chloride       After obtaining informed consent  at which time there was a discussion about the purpose of biopsy  and low risks of infection and bleeding  The area was prepped and draped in the usual fashion  Anesthesia was obtained with 1% lidocaine with epinephrine  A shave biopsy to an appropriate sampling depth was obtained by Shave (Dermablade or 15 blade) The resulting wound was covered with surgical ointment and bandaged appropriately  The patient tolerated the procedure well without complications and was without signs of functional compromise        Specimen has been sent for review by Dermatopathology  Standard post-procedure care has been explained and has been included in written form within the patient's copy of Informed Consent  INFORMED CONSENT DISCUSSION AND POST-OPERATIVE INSTRUCTIONS FOR PATIENT    I   RATIONALE FOR PROCEDURE  I understand that a skin biopsy allows the Dermatologist to test a lesion or rash under the microscope to obtain a diagnosis  It usually involves numbing the area with numbing medication and removing a small piece of skin; sometimes the area will be closed with sutures  In this specific procedure, sutures are not usually needed  If any sutures are placed, then they are usually need to be removed in 2 weeks or less  I understand that my Dermatologist recommends that a skin "shave" biopsy be performed today  A local anesthetic, similar to the kind that a dentist uses when filling a cavity, will be injected with a very small needle into the skin area to be sampled  The injected skin and tissue underneath "will go to sleep” and become numb so no pain should be felt afterwards  An instrument shaped like a tiny "razor blade" (shave biopsy instrument) will be used to cut a small piece of tissue and skin from the area so that a sample of tissue can be taken and examined more closely under the microscope  A slight amount of bleeding will occur, but it will be stopped with direct pressure and a pressure bandage and any other appropriate methods  I understands that a scar will form where the wound was created  Surgical ointment will be applied to help protect the wound  Sutures are not usually needed      II   RISKS AND POTENTIAL COMPLICATIONS   I understand the risks and potential complications of a skin biopsy include but are not limited to the following:  • Bleeding  • Infection  • Pain  • Scar/keloid  • Skin discoloration  • Incomplete Removal  • Recurrence  • Nerve Damage/Numbness/Loss of Function  • Allergic Reaction to Anesthesia  • Biopsies are diagnostic procedures and based on findings additional treatment or evaluation may be required  • Loss or destruction of specimen resulting in no additional findings    My Dermatologist has explained to me the nature of the condition, the nature of the procedure, and the benefits to be reasonably expected compared with alternative approaches  My Dermatologist has discussed the likelihood of major risks or complications of this procedure including the specific risks listed above, such as bleeding, infection, and scarring/keloid  I understand that a scar is expected after this procedure  I understand that my physician cannot predict if the scar will form a "keloid," which extends beyond the borders of the wound that is created  A keloid is a thick, painful, and bumpy scar  A keloid can be difficult to treat, as it does not always respond well to therapy, which includes injecting cortisone directly into the keloid every few weeks  While this usually reduces the pain and size of the scar, it does not eliminate it  I understand that photographs may be taken before and after the procedure  These will be maintained as part of the medical providers confidential records and may not be made available to me  I further authorize the medical provider to use the photographs for teaching purposes or to illustrate scientific papers, books, or lectures if in his/her judgment, medical research, education, or science may benefit from its use  I have had an opportunity to fully inquire about the risks and benefits of this procedure and its alternatives  I have been given ample time and opportunity to ask questions and to seek a second opinion if I wished to do so  I acknowledge that there have specifically been no guarantees as to the cosmetic results from the procedure  I am aware that with any procedure there is always the possibility of an unexpected complication  III   POST-PROCEDURAL CARE (WHAT YOU WILL NEED TO DO "AFTER THE BIOPSY" TO OPTIMIZE HEALING)    • Keep the area clean and dry  Try NOT to remove the bandage or get it wet for the first 24 hours  • Gently clean the area and apply surgical ointment (such as Vaseline petrolatum ointment, which is available "over the counter" and not a prescription) to the biopsy site for up to 2 weeks straight  This acts to protect the wound from the outside world  • Sutures are not usually placed in this procedure  If any sutures were placed, return for suture removal as instructed (generally 1 week for the face, 2 weeks for the body)  • Take Acetaminophen (Tylenol) for discomfort, if no contraindications  Ibuprofen or aspirin could make bleeding worse  • Call our office immediately for signs of infection: fever, chills, increased redness, warmth, tenderness, discomfort/pain, or pus or foul smell coming from the wound  WHAT TO DO IF THERE IS ANY BLEEDING? If a small amount of bleeding is noticed, place a clean cloth over the area and apply firm pressure for ten minutes  Check the wound after 10 minutes of direct pressure  If bleeding persists, try one more time for an additional 10 minutes of direct pressure on the area  If the bleeding becomes heavier or does not stop after the second attempt, or if you have any other questions about this procedure, then please call your [address] [place] [address]'s Dermatologist by calling [phone] (SKIN)  I hereby acknowledge that I have reviewed and verified the site with my Dermatologist and have requested and authorized my Dermatologist to proceed with the procedure                    Scribe Attestation    I,:  Lisa Maxwell am acting as a scribe while in the presence of the attending physician :       I,:  Sarmad Carbajal MD personally performed the services described in this documentation    as scribed in my presence :

## 2022-10-20 ENCOUNTER — OFFICE VISIT (OUTPATIENT)
Dept: OBGYN CLINIC | Facility: CLINIC | Age: 22
End: 2022-10-20

## 2022-10-20 VITALS
BODY MASS INDEX: 30.53 KG/M2 | HEIGHT: 66 IN | SYSTOLIC BLOOD PRESSURE: 124 MMHG | DIASTOLIC BLOOD PRESSURE: 68 MMHG | WEIGHT: 190 LBS

## 2022-10-20 DIAGNOSIS — F41.9 ANXIETY AND DEPRESSION: Primary | ICD-10-CM

## 2022-10-20 DIAGNOSIS — F32.A ANXIETY AND DEPRESSION: Primary | ICD-10-CM

## 2022-10-20 RX ORDER — ALPRAZOLAM 0.25 MG/1
0.25 TABLET ORAL
Qty: 10 TABLET | Refills: 0 | Status: SHIPPED | OUTPATIENT
Start: 2022-10-20

## 2022-10-20 NOTE — PROGRESS NOTES
Assessment Nic Lee was seen today for postpartum care  Diagnoses and all orders for this visit:    Anxiety and depression  -     ALPRAZolam (XANAX) 0 25 mg tablet; Take 1 tablet (0 25 mg total) by mouth daily at bedtime as needed for anxiety         Plan  EDPS 18 down from 23   We discussed continued follow up with therapist as well as continuing zoloft  We discussed adding xanax for PRN use as her anxiety has gotten worse   Risks of medication discussed   Denies SI or HI   Will follow up in 3-4 weeks     Subjective   Shanna Born is a 19 y o  female here for a follow up from PPD and anxiety  States she has continued zoloft but has noticed increased nightmares and increased anxiety  They recently moved into new house and is constantly fearful of harm and spiders      Patient Active Problem List   Diagnosis   • COVID-19 affecting pregnancy in first trimester   • Bipolar depression ([facility] [address] )   • Depressive disorder   • Insomnia   • 39 weeks gestation of pregnancy   • Depression during pregnancy in third trimester   • Anemia during pregnancy in third trimester   • Migraine headache   • Prior fetal macrosomia in third trimester, antepartum   • Vaginal discharge during pregnancy   •  (spontaneous vaginal delivery)   • At risk for postpartum depression       Gynecologic History  No LMP recorded (lmp unknown)        Past Medical History:   Diagnosis Date   • Depression    • History of transfusion        • Migraine headache 2022   • Varicella     vaccine     Past Surgical History:   Procedure Laterality Date   • SKIN GRAFT Left     left elbow   • SKIN GRAFT      left elbow     Family History   Problem Relation Age of Onset   • Hypothyroidism Mother    • Bipolar disorder Mother    • Mental illness Mother    • Thyroid disease Mother         Hypothyroidism   • Schizophrenia Mother    • No Known Problems Father    • Cancer Maternal Grandmother         Breast cancer   • Heart disease Maternal Grandmother    • Breast cancer Maternal Grandmother    • Lung cancer Maternal Grandfather    • Cancer Maternal Grandfather         Lung cancer   • Emphysema Maternal Grandfather    • Asperger's syndrome Brother    • Addiction problem Brother    • No Known Problems Son    • Varicose Veins Paternal Grandmother    • Diabetes Paternal Grandfather    • Depression Half-Sister    • No Known Problems Brother    • Colon cancer Neg Hx    • Ovarian cancer Neg Hx      Social History     Socioeconomic History   • Marital status: /Civil Union     Spouse name: Not on file   • Number of children: Not on file   • Years of education: Not on file   • Highest education level: Not on file   Occupational History   • Not on file   Tobacco Use   • Smoking status: Never Smoker   • Smokeless tobacco: Never Used   Vaping Use   • Vaping Use: Never used   Substance and Sexual Activity   • Alcohol use: Not Currently     Alcohol/week: 2 0 standard drinks     Types: 2 Glasses of wine per week   • Drug use: No   • Sexual activity: Yes     Partners: Male     Birth control/protection: OCP   Other Topics Concern   • Not on file   Social History Narrative   • Not on file     Social Determinants of Health     Financial Resource Strain: Not on file   Food Insecurity: Not on file   Transportation Needs: Not on file   Physical Activity: Not on file   Stress: Not on file   Social Connections: Not on file   Intimate Partner Violence: Not on file   Housing Stability: Not on file     Allergies   Allergen Reactions   • Benadryl [Diphenhydramine] Hives       Current Outpatient Medications:   •  ALPRAZolam (XANAX) 0 25 mg tablet, Take 1 tablet (0 25 mg total) by mouth daily at bedtime as needed for anxiety, Disp: 10 tablet, Rfl: 0  •  ibuprofen (MOTRIN) 600 mg tablet, Take 1 tablet (600 mg total) by mouth every 6 (six) hours as needed for mild pain, Disp: 60 tablet, Rfl: 1  •  norethindrone (Ortho Micronor) 0 35 MG tablet, Take 1 tablet (0 35 mg total) by mouth daily, Disp: 28 tablet, Rfl: 2  •  Prenatal Vit-Fe Fumarate-FA (PRENATAL VITAMIN PO), , Disp: , Rfl:   •  sertraline (Zoloft) 50 mg tablet, Take 1 tablet (50 mg total) by mouth daily, Disp: 60 tablet, Rfl: 1  •  acetaminophen (TYLENOL) 500 mg tablet, Take 1 tablet (500 mg total) by mouth every 6 (six) hours as needed for mild pain (Patient not taking: No sig reported), Disp: , Rfl: 0  •  docusate sodium (COLACE) 100 mg capsule, Take 1 capsule (100 mg total) by mouth 2 (two) times a day as needed for constipation, Disp: 60 capsule, Rfl: 0    Review of Systems  Constitutional :no fever, feels well, no tiredness, no recent weight gain or loss  ENT: no ear ache, no loss of hearing, no nosebleeds or nasal discharge, no sore throat or hoarseness  Cardiovascular: no complaints of slow or fast heart beat, no chest pain, no palpitations, no leg claudication or lower extremity edema  Respiratory: no complaints of shortness of shortness of breath, no SANDOVAL  Breasts:no complaints of breast pain, breast lump, or nipple discharge  Gastrointestinal: no complaints of abdominal pain, constipation, nausea, vomiting, or diarrhea or bloody stools  Genitourinary : no complaints of dysuria, incontinence, pelvic pain, no dysmenorrhea, vaginal discharge or abnormal vaginal bleeding and as noted in HPI  Musculoskeletal: no complaints of arthralgia, no myalgia, no joint swelling or stiffness, no limb pain or swelling  Integumentary: no complaints of skin rash or lesion, itching or dry skin  Neurological: no complaints of headache, no confusion, no numbness or tingling, no dizziness or fainting     Objective     /68   Ht 5' 6" (1 676 m)   Wt 86 2 kg (190 lb)   LMP  (LMP Unknown)   BMI 30 67 kg/m²     General:   appears stated age, cooperative, alert normal mood and affect   Lungs: Unlabored breathing     Skin normal skin turgor and no rashes     Psychiatric orientation to person, place, and time: normal  mood and affect: normal

## 2022-11-11 ENCOUNTER — OFFICE VISIT (OUTPATIENT)
Dept: OBGYN CLINIC | Facility: MEDICAL CENTER | Age: 22
End: 2022-11-11

## 2022-11-11 VITALS
HEIGHT: 66 IN | SYSTOLIC BLOOD PRESSURE: 120 MMHG | WEIGHT: 193.6 LBS | DIASTOLIC BLOOD PRESSURE: 74 MMHG | BODY MASS INDEX: 31.12 KG/M2

## 2022-11-11 NOTE — PROGRESS NOTES
Assessment Claudean Hope was seen today for follow-up  Diagnoses and all orders for this visit:    Postpartum depression  -     sertraline (Zoloft) 50 mg tablet; Take 1 tablet (50 mg total) by mouth daily        Plan  We discussed medication continuation   Much improved from before   Has support ay home and following with therapist   Will follow at time of yearly   Aware I am available if needed before   EDPS 15 down from  23 at its highest        [address] is a 23 y o  female here for a follow up visit   Thanks overall doing OK and better than weeks ago  First time she left baby with her  and is doing well with this   Has only taken Xanax once and did well    Desires to continue zoloft    Denies SI or HI    Patient Active Problem List   Diagnosis   • COVID-19 affecting pregnancy in first trimester   • Bipolar depression ([facility] [address] )   • Depressive disorder   • Insomnia   • 39 weeks gestation of pregnancy   • Depression during pregnancy in third trimester   • Anemia during pregnancy in third trimester   • Migraine headache   • Prior fetal macrosomia in third trimester, antepartum   • Vaginal discharge during pregnancy   •  (spontaneous vaginal delivery)   • At risk for postpartum depression       Gynecologic History  No LMP recorded (lmp unknown)        Past Medical History:   Diagnosis Date   • Depression    • History of transfusion        • Migraine headache 2022   • Varicella     vaccine     Past Surgical History:   Procedure Laterality Date   • SKIN GRAFT Left     left elbow   • SKIN GRAFT      left elbow     Family History   Problem Relation Age of Onset   • Hypothyroidism Mother    • Bipolar disorder Mother    • Mental illness Mother    • Thyroid disease Mother         Hypothyroidism   • Schizophrenia Mother    • No Known Problems Father    • Cancer Maternal Grandmother         Breast cancer   • Heart disease Maternal Grandmother    • Breast cancer Maternal Grandmother    • Lung cancer Maternal Grandfather    • Cancer Maternal Grandfather         Lung cancer   • Emphysema Maternal Grandfather    • Asperger's syndrome Brother    • Addiction problem Brother    • No Known Problems Son    • Varicose Veins Paternal Grandmother    • Diabetes Paternal Grandfather    • Depression Half-Sister    • No Known Problems Brother    • Colon cancer Neg Hx    • Ovarian cancer Neg Hx      Social History     Socioeconomic History   • Marital status: /Civil Union     Spouse name: Not on file   • Number of children: Not on file   • Years of education: Not on file   • Highest education level: Not on file   Occupational History   • Not on file   Tobacco Use   • Smoking status: Never Smoker   • Smokeless tobacco: Never Used   Vaping Use   • Vaping Use: Never used   Substance and Sexual Activity   • Alcohol use: Not Currently     Alcohol/week: 2 0 standard drinks     Types: 2 Glasses of wine per week   • Drug use: No   • Sexual activity: Yes     Partners: Male     Birth control/protection: OCP   Other Topics Concern   • Not on file   Social History Narrative   • Not on file     Social Determinants of Health     Financial Resource Strain: Not on file   Food Insecurity: Not on file   Transportation Needs: Not on file   Physical Activity: Not on file   Stress: Not on file   Social Connections: Not on file   Intimate Partner Violence: Not on file   Housing Stability: Not on file     Allergies   Allergen Reactions   • Benadryl [Diphenhydramine] Hives       Current Outpatient Medications:   •  ALPRAZolam (XANAX) 0 25 mg tablet, Take 1 tablet (0 25 mg total) by mouth daily at bedtime as needed for anxiety, Disp: 10 tablet, Rfl: 0  •  ibuprofen (MOTRIN) 600 mg tablet, Take 1 tablet (600 mg total) by mouth every 6 (six) hours as needed for mild pain, Disp: 60 tablet, Rfl: 1  •  norethindrone (Ortho Micronor) 0 35 MG tablet, Take 1 tablet (0 35 mg total) by mouth daily, Disp: 28 tablet, Rfl: 2  •  Prenatal Vit-Fe Fumarate-FA (PRENATAL VITAMIN PO), , Disp: , Rfl:   •  acetaminophen (TYLENOL) 500 mg tablet, Take 1 tablet (500 mg total) by mouth every 6 (six) hours as needed for mild pain (Patient not taking: No sig reported), Disp: , Rfl: 0  •  docusate sodium (COLACE) 100 mg capsule, Take 1 capsule (100 mg total) by mouth 2 (two) times a day as needed for constipation, Disp: 60 capsule, Rfl: 0  •  sertraline (Zoloft) 50 mg tablet, Take 1 tablet (50 mg total) by mouth daily, Disp: 60 tablet, Rfl: 2    Review of Systems  Constitutional :as noted in HPI  ENT: no ear ache, no loss of hearing, no nosebleeds or nasal discharge, no sore throat or hoarseness  Cardiovascular: no complaints of slow or fast heart beat, no chest pain, no palpitations, no leg claudication or lower extremity edema  Respiratory: no complaints of shortness of shortness of breath, no SANDOVAL  Breasts:no complaints of breast pain, breast lump, or nipple discharge  Gastrointestinal: no complaints of abdominal pain, constipation, nausea, vomiting, or diarrhea or bloody stools  Genitourinary : no complaints of dysuria, incontinence, pelvic pain, no dysmenorrhea, vaginal discharge or abnormal vaginal bleeding and as noted in HPI  Musculoskeletal: no complaints of arthralgia, no myalgia, no joint swelling or stiffness, no limb pain or swelling  Integumentary: no complaints of skin rash or lesion, itching or dry skin  Neurological: no complaints of headache, no confusion, no numbness or tingling, no dizziness or fainting     Objective     /74   Ht 5' 6" (1 676 m)   Wt 87 8 kg (193 lb 9 6 oz)   LMP  (LMP Unknown)   BMI 31 25 kg/m²     General:   appears stated age, cooperative, alert normal mood and affect   Lungs: Unlabored breathing     Skin normal skin turgor and no rashes     Psychiatric orientation to person, place, and time: normal  mood and affect: normal

## 2023-03-13 NOTE — LETTER
3/13/2023         RE: Gustavo Ramon  [address] [facility] [id number]        Dear Colleague,    Thank you for referring your patient, Gustavo Ramon, to the [facility]. Please see a copy of my visit note below.    The following medication was given:     MEDICATION:  Lidocaine with epinephrine 1% 1:[id number]  ROUTE: SQ  SITE: see procedure note  DOSE:   LOT #: [id number]  : [employer]  EXPIRATION DATE: 09-  NDC#: 28468-502-60  Was there drug waste?   Multi-dose vial: Yes    Jeanette VargheseKALEIGH  March 13, 2023      Vaseline and pressure dressing applied to Mohs site on Left upper cutaneous lip.  Wound care instructions reviewed with patient and AVS provided.  Patient verbalized understanding.  Patient will follow up for suture removal: N/A.  No further questions or concerns at this time.    [facility] [place] Procedure Note    Dermatology Problem List:  Last skin check 9/23/22, recommended next in 6 months  0. NUB - R scaphoid fossa, L preauricular cheek, L conchal bowl s/p biopsy 3/13/23    1. NMSC  - BCC nodular and infiltrating, left nasal side wall, MMS 9/24/20  - BCC, Right upper arm, s/p ED&C 8/27/2020  - BCC, Left upper back, s/p ED&C 8/27/2020  - SCCIS, left infraorbital, s/p MMS 9/6/18  - BCC, right elbow, s/p ED & C 1/12/16  - BCC, left forearm, s/p excision 1/12/16  - BCC, right posterior shoulder and left posterior shoulder, s/p Aldara 11/2015  - BCC, right side of nose per pathology, (right medial cheek per Dr. Christiansen's note 11/21/11), s/p excision 5/12/09   2. Actinic keratoses  - R tragus s/p LN2 3/13/23; s/p biopsy 9/23/22  - s/p Efudex 2015, Fall 2020 to face, Spring 2021 to forearms  - s/p PDT (x3)  - s/p LN2  3. Seborrheic dermatitis  - clobetasol 0.05% solution for scalp, triam 0.1% cream for ears  4. Relevant medical history: MGUS, myasthenia gravis currently on prednisone  5. Tinea  July 6, 2022     Joaquin Wood MD  [address]    Patient: Pepe Ortiz   YOB: 2000   Date of Visit: 7/6/2022       Dear Dr Opal Bettencourt: Thank you for referring Vincent Vital to me for evaluation  Below are my notes for this consultation  If you have questions, please do not hesitate to call me  I look forward to following your patient along with you  Sincerely,        Akbar Mcdonald MD        CC: No Recipients  Akbar Mcdonald MD  7/4/2022  4:05 PM  Sign when Signing Visit  Please refer to the [facility] ultrasound report in Ob Procedures for additional information regarding today's visit  gustavo  - current tx: ketoconazole cream  6. 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 Lipoma L bicep, s/p biopsy 9/23/22    Social history: The patient is retired from working as an . He has a daughter and son. He is Iranian.  Family history: Negative for skin cancer  ____________________________________________      Date of Service:  Mar 13, 2023  Surgery: Excision benign lesion with Frozen Specimen Review    Case 1  Repair Type: intermediate  Repair Size: 2.0 cm  Suture Material: Fast Absorbing Gut 5-0  Tumor Type: Other (comments) (Features consistent with trichoepithelioma)  Location: left upper cutaneous lip  Derm-Path Accession #: [id number]  PreOp Size: 0.6 x 0.5 cm  PostOp Size: 1.0 x 0.9 cm  Mohs Accession #: [id number]  Level of Defect: fascia      Procedure:  We discussed the principles of treatment and most likely complications including scarring, bleeding, infection, swelling, pain, crusting, nerve damage, large wound,  incomplete excision, wound dehiscence,  nerve damage, recurrence, and a second procedure may be recommended to obtain the best cosmetic or functional result.    Informed consent was obtained and the patient underwent the procedure as follows:  The patient was placed supine on the operating table.  The cancer was identified, outlined with a marker, and verified by the patient.  The entire surgical field was prepped with Hibiclens;Sterile saline.  The surgical site was anesthetized using Lidocaine 1% with epi 1:100,000.      The area of clinically apparent tumor was debulked. The layer of tissue was then surgically excised using a #15 blade and was then transferred onto a specimen sheet maintaining the orientation of the specimen. Hemostasis was obtained using bipolar electrocoagulation. The wound site was then covered with a dressing while the tissue samples were processed for examination.    The excised tissue was transported to the Mohs histology laboratory maintaining  the tissue orientation.  The tissue specimen was relaxed so that the entire surgical margin was in a a single horizontal plane for sectioning and inked for precise mapping.  A precise reference map was drawn to reflect the sectioning of the specimen, colored inking of the margins, and orientation on the patient. The tissue was processed using horizontal sectioning of the base and continuous peripheral margins.  The histopathologic sections were reviewed in conjunction with the reference map.    Total blocks: 1    Total slides:  2    There were no cancer cells visualized on examination, therefore excision benign lesion with frozen specimen review surgery was complete.    REPAIR: An intermediate layered linear closure was selected as the procedure which would maximally preserve both function and cosmesis.    After the excision of the tumor, the area was undermined. Hemostasis was obtained with bipolar electrocoagulation.  Closure was oriented so that the wound was in the patient's natural skin tension lines. The deep subcutaneous and dermal layers were then closed with 5-0 monocryl buried vertical mattress sutures. The epidermis was then carefully approximated along the length of the wound using 5-0 Fast Absorbing Gut simple running sutures.     Estimated blood loss was less than 10 ml for all surgical sites. A sterile pressure dressing was applied and wound care instructions, with a written handout, were given. The patient was discharged from the [facility] alert and ambulatory.      Neoplasm of uncertain behavior of skin x3  - R scaphoid fossa, superficially eroded pink papule  - L conchal bowl, scaly papule over cartilagenous prominence.   - L preauricular cheek, pale skin colored papule    Shave biopsy: Location(s) R scaphoid fossa, L conchal bowl, L preauricular cheek.  After discussion of benefits and risks including but not limited to bleeding/bruising, pain/swelling, infection, scar,  incomplete removal, nerve damage/numbness, recurrence, and non-diagnostic biopsy, verbal consent and photographs were obtained. Time-out was performed. The area was cleaned with isopropyl alcohol. 0.5mL of 1% lidocaine with epinephrine was injected to obtain adequate anesthesia of each lesion. Shave biopsy was performed x3. Hemostasis was achieved with aluminium chloride. Vaseline and a sterile dressing were applied. The patient tolerated the procedure and no complications were noted. The patient was provided with verbal and written post care instructions.     - Additionally there was a 4th NUB of the anterior left sideburn (visible in picture)  - poorly defined pink plaque.   - No scale to confirm AK, no features to confirm BCC.   - Patient has skin cancer screening with Dr. Treviño in about 1 month, will request a second opinion for her.       Inflamed seborrheic Keratosis x2  Actinic Keratosis x2 (R tragus and L scaphoid fossa)    Cryotherapy procedure note: After verbal consent and discussion of risks and benefits including but no limited to dyspigmentation/scar, blister, and pain, 2AK's and 2 ISK's were treated with 1-2mm freeze border for 2 cycles with liquid nitrogen. Post cryotherapy instructions were provided.         Follow-up pending biopsy results vs as scheduled with Dr. Treviño.     I personally performed the procedures today.      Shashank Victor DO    Department of Dermatology  [facility]: Phone: [phone], Fax:[phone]  [facility]: Phone: [phone], Fax: [phone]    Care and Laboratory Testing Performed at:  [facility]   Dermatology Clinic  [address] [place] [address]      Duplicate note opened in error.            Again, thank you for allowing me to participate in the care of your patient.        Sincerely,        Shashank Victor,  MD

## 2023-05-26 ENCOUNTER — TELEPHONE (OUTPATIENT)
Dept: PSYCHIATRY | Facility: CLINIC | Age: 23
End: 2023-05-26

## 2023-05-26 NOTE — TELEPHONE ENCOUNTER
Patient has been added to the [facility] wait list without a referral     Insurance: [employer]  Insurance Type:    Commercial []   Medicaid [x]   [place] (if applicable)   Medicare []  Location Preference: [place]  Provider Preference: female  Virtual: Yes [x] No []      Mailed outside [facility]

## 2023-06-05 ENCOUNTER — OFFICE VISIT (OUTPATIENT)
Dept: OBGYN CLINIC | Facility: CLINIC | Age: 23
End: 2023-06-05

## 2023-06-05 VITALS
HEIGHT: 66 IN | WEIGHT: 177 LBS | SYSTOLIC BLOOD PRESSURE: 117 MMHG | DIASTOLIC BLOOD PRESSURE: 75 MMHG | BODY MASS INDEX: 28.45 KG/M2

## 2023-06-05 DIAGNOSIS — Z30.09 BIRTH CONTROL COUNSELING: Primary | ICD-10-CM

## 2023-06-05 PROBLEM — O09.293 PRIOR FETAL MACROSOMIA IN THIRD TRIMESTER, ANTEPARTUM: Status: RESOLVED | Noted: 2022-05-25 | Resolved: 2023-06-05

## 2023-06-05 PROBLEM — O99.013 ANEMIA DURING PREGNANCY IN THIRD TRIMESTER: Status: RESOLVED | Noted: 2022-05-06 | Resolved: 2023-06-05

## 2023-06-05 PROBLEM — N89.8 VAGINAL DISCHARGE DURING PREGNANCY: Status: RESOLVED | Noted: 2022-06-25 | Resolved: 2023-06-05

## 2023-06-05 PROBLEM — O26.899 VAGINAL DISCHARGE DURING PREGNANCY: Status: RESOLVED | Noted: 2022-06-25 | Resolved: 2023-06-05

## 2023-06-05 PROBLEM — U07.1 COVID-19 AFFECTING PREGNANCY IN FIRST TRIMESTER: Status: RESOLVED | Noted: 2022-01-13 | Resolved: 2023-06-05

## 2023-06-05 PROBLEM — O98.511 COVID-19 AFFECTING PREGNANCY IN FIRST TRIMESTER: Status: RESOLVED | Noted: 2022-01-13 | Resolved: 2023-06-05

## 2023-06-05 PROBLEM — Z3A.39 39 WEEKS GESTATION OF PREGNANCY: Status: RESOLVED | Noted: 2022-01-31 | Resolved: 2023-06-05

## 2023-06-05 PROCEDURE — 99203 OFFICE O/P NEW LOW 30 MIN: CPT | Performed by: NURSE PRACTITIONER

## 2023-06-05 RX ORDER — NORETHINDRONE ACETATE AND ETHINYL ESTRADIOL 1MG-20(21)
1 KIT ORAL DAILY
Qty: 28 TABLET | Refills: 6 | Status: SHIPPED | OUTPATIENT
Start: 2023-06-05

## 2023-06-05 NOTE — PROGRESS NOTES
PROBLEM GYNECOLOGICAL VISIT    Soco Gilbert is a 23 y o  female who presents today for contraception  Her general medical history has been reviewed and she reports it as follows:    Past Medical History:   Diagnosis Date   • Anxiety    • Depression    • History of transfusion        • Migraine headache 2022   • Varicella     vaccine     Past Surgical History:   Procedure Laterality Date   • SKIN GRAFT Left     left elbow   • SKIN GRAFT      left elbow     OB History        4    Para   2    Term   2            AB   2    Living   2       SAB   2    IAB        Ectopic        Multiple   0    Live Births   2               Social History     Tobacco Use   • Smoking status: Never   • Smokeless tobacco: Never   Vaping Use   • Vaping Use: Never used   Substance Use Topics   • Alcohol use: Not Currently     Alcohol/week: 2 0 standard drinks of alcohol     Types: 2 Glasses of wine per week   • Drug use: No     Social History     Substance and Sexual Activity   Sexual Activity Yes   • Partners: Male       Current Outpatient Medications   Medication Instructions   • acetaminophen (TYLENOL) 500 mg, Oral, Every 6 hours PRN   • ALPRAZolam (XANAX) 0 25 mg, Oral, Daily at bedtime PRN   • docusate sodium (COLACE) 100 mg, Oral, 2 times daily PRN   • ibuprofen (MOTRIN) 600 mg, Oral, Every 6 hours PRN   • norethindrone (ORTHO MICRONOR) 0 35 mg, Oral, Daily   • Prenatal Vit-Fe Fumarate-FA (PRENATAL VITAMIN PO) No dose, route, or frequency recorded  • sertraline (ZOLOFT) 50 mg, Oral, Daily       History of Present Illness:   Dakota Chi presents today as a new patient to start contraception  She is is not currently using any contraception but would like to start a reliable form of contraception  She has used OCPs in the past and liked this method  She has not contraindications to hormone use  Review of Systems:  Review of Systems   Constitutional: Negative  Gastrointestinal: Negative  "  Genitourinary: Negative  Physical Exam:  /75 (BP Location: Left arm, Patient Position: Sitting, Cuff Size: Adult)   Ht 5' 6\" (1 676 m)   Wt 80 3 kg (177 lb)   LMP 05/30/2023 (Exact Date)   Breastfeeding No   BMI 28 57 kg/m²   Physical Exam  Constitutional:       General: She is not in acute distress  Appearance: Normal appearance  Neurological:      Mental Status: She is alert  Skin:     General: Skin is warm and dry  Psychiatric:         Mood and Affect: Mood normal          Behavior: Behavior normal    Vitals reviewed  Assessment:   1  Birth control counseling     Plan:   1  Education given regarding options for contraception, including barrier methods, injectable contraception, IUD placement, oral contraceptives, nuvaring, nexplanon and contraceptive patch     2  She would like to return to [facility] use  She has used Junel FE 1/20 in the past and felt well on this pill  3  Will return for annual exam      Reviewed with patient that test results are available in Psychiatrict immediately, but that they will not necessarily be reviewed by me immediately  Explained that I will review results at my earliest opportunity and contact patient appropriately    "

## 2023-06-14 ENCOUNTER — APPOINTMENT (OUTPATIENT)
Dept: LAB | Facility: CLINIC | Age: 23
End: 2023-06-14
Payer: COMMERCIAL

## 2023-06-14 ENCOUNTER — OCCMED (OUTPATIENT)
Dept: URGENT CARE | Facility: CLINIC | Age: 23
End: 2023-06-14
Payer: COMMERCIAL

## 2023-06-14 DIAGNOSIS — Z02.1 PRE-EMPLOYMENT HEALTH SCREENING EXAMINATION: ICD-10-CM

## 2023-06-14 DIAGNOSIS — Z02.1 PRE-EMPLOYMENT HEALTH SCREENING EXAMINATION: Primary | ICD-10-CM

## 2023-06-14 LAB — RUBV IGG SERPL IA-ACNC: 46.7 IU/ML

## 2023-06-14 PROCEDURE — 36415 COLL VENOUS BLD VENIPUNCTURE: CPT

## 2023-06-14 PROCEDURE — 86735 MUMPS ANTIBODY: CPT

## 2023-06-14 PROCEDURE — 86480 TB TEST CELL IMMUN MEASURE: CPT

## 2023-06-14 PROCEDURE — 86787 VARICELLA-ZOSTER ANTIBODY: CPT

## 2023-06-14 PROCEDURE — 86765 RUBEOLA ANTIBODY: CPT

## 2023-06-14 PROCEDURE — 86762 RUBELLA ANTIBODY: CPT

## 2023-06-15 LAB
MEV IGG SER QL IA: ABNORMAL
MUV IGG SER QL IA: NORMAL
VZV IGG SER QL IA: ABNORMAL

## 2023-06-18 LAB
GAMMA INTERFERON BACKGROUND BLD IA-ACNC: 0.03 IU/ML
M TB IFN-G BLD-IMP: NEGATIVE
M TB IFN-G CD4+ BCKGRND COR BLD-ACNC: -0.01 IU/ML
M TB IFN-G CD4+ BCKGRND COR BLD-ACNC: -0.01 IU/ML
MITOGEN IGNF BCKGRD COR BLD-ACNC: >10 IU/ML

## 2023-07-09 ENCOUNTER — APPOINTMENT (OUTPATIENT)
Dept: RADIOLOGY | Age: 23
End: 2023-07-09
Payer: COMMERCIAL

## 2023-07-09 ENCOUNTER — OFFICE VISIT (OUTPATIENT)
Dept: URGENT CARE | Age: 23
End: 2023-07-09
Payer: COMMERCIAL

## 2023-07-09 VITALS
HEART RATE: 83 BPM | DIASTOLIC BLOOD PRESSURE: 79 MMHG | BODY MASS INDEX: 27.94 KG/M2 | WEIGHT: 178 LBS | HEIGHT: 67 IN | RESPIRATION RATE: 18 BRPM | TEMPERATURE: 98 F | OXYGEN SATURATION: 99 % | SYSTOLIC BLOOD PRESSURE: 134 MMHG

## 2023-07-09 DIAGNOSIS — S99.921A INJURY OF TOE ON RIGHT FOOT, INITIAL ENCOUNTER: Primary | ICD-10-CM

## 2023-07-09 DIAGNOSIS — M79.671 RIGHT FOOT PAIN: ICD-10-CM

## 2023-07-09 DIAGNOSIS — S99.921A INJURY OF TOE ON RIGHT FOOT, INITIAL ENCOUNTER: ICD-10-CM

## 2023-07-09 PROCEDURE — 73630 X-RAY EXAM OF FOOT: CPT

## 2023-07-09 PROCEDURE — 99213 OFFICE O/P EST LOW 20 MIN: CPT | Performed by: NURSE PRACTITIONER

## 2023-07-09 NOTE — PROGRESS NOTES
[facility] Now        NAME: Perfecto Mejia is a 23 y.o. female  : 2000    MRN: [id number]  DATE: 2023  TIME: 9:58 AM    Assessment and Plan   Injury of toe on right foot, initial encounter [S99.921A]  1. Injury of toe on right foot, initial encounter  XR foot 3+ vw right    Ambulatory referral to Podiatry    CANCELED: XR toe right great min 2 view      2. Right foot pain  Ambulatory referral to Podiatry            Patient Instructions     Wet read xray negative. CAM boot applied in office. Will heed radiologist read. Educated on elevate/ice as tolerates and ibuprofen/motrin as needed for pain. Follow up with PCP in 3-5 days. Proceed to  ER if symptoms worsen-red flags discussed. Chief Complaint     Chief Complaint   Patient presents with   • Toe Injury     Right Great toe injury last night         History of Present Illness       Patient is a 78-year-old female arrives with complaints of stubbing her toe on a furniture yesterday afternoon. Patient states she was walking fine no issues following however then later in the day she decided to crack her toes and when she cracked her toe she heard a pop and now she is having pain in the great toe radiates up the medial aspect to the ankle. Neurovascular intact. No bruising, redness. Does have some associated swelling of the great right toe. Wet read x-ray negative      Review of Systems   Review of Systems   Constitutional: Negative for activity change, appetite change, chills, fatigue and fever. HENT: Negative for congestion, postnasal drip, rhinorrhea, sneezing and sore throat. Respiratory: Negative for cough, chest tightness, shortness of breath and wheezing. Cardiovascular: Negative for chest pain and palpitations. Gastrointestinal: Negative for abdominal pain, constipation, diarrhea, nausea and vomiting. Musculoskeletal: Positive for arthralgias (big toe), gait problem and joint swelling. Negative for myalgias. Skin: Negative for color change, pallor and rash. Neurological: Negative for dizziness, weakness, light-headedness and headaches. Hematological: Negative for adenopathy. Psychiatric/Behavioral: Negative for agitation and confusion.          Current Medications       Current Outpatient Medications:   •  norethindrone-ethinyl estradiol (Junel FE 1/20) 1-20 MG-MCG per tablet, Take 1 tablet by mouth daily, Disp: 28 tablet, Rfl: 6  •  acetaminophen (TYLENOL) 500 mg tablet, Take 1 tablet (500 mg total) by mouth every 6 (six) hours as needed for mild pain (Patient not taking: Reported on 7/28/2022), Disp: , Rfl: 0  •  ALPRAZolam (XANAX) 0.25 mg tablet, Take 1 tablet (0.25 mg total) by mouth daily at bedtime as needed for anxiety (Patient not taking: Reported on 6/5/2023), Disp: 10 tablet, Rfl: 0  •  docusate sodium (COLACE) 100 mg capsule, Take 1 capsule (100 mg total) by mouth 2 (two) times a day as needed for constipation, Disp: 60 capsule, Rfl: 0  •  ibuprofen (MOTRIN) 600 mg tablet, Take 1 tablet (600 mg total) by mouth every 6 (six) hours as needed for mild pain (Patient not taking: Reported on 6/5/2023), Disp: 60 tablet, Rfl: 1  •  sertraline (Zoloft) 50 mg tablet, Take 1 tablet (50 mg total) by mouth daily (Patient not taking: Reported on 6/5/2023), Disp: 60 tablet, Rfl: 2    Current Allergies     Allergies as of 07/09/2023 - Reviewed 07/09/2023   Allergen Reaction Noted   • Benadryl [diphenhydramine] Hives 04/19/2016            The following portions of the patient's history were reviewed and updated as appropriate: allergies, current medications, past family history, past medical history, past social history, past surgical history and problem list.     Past Medical History:   Diagnosis Date   • Anxiety    • Depression 2011   • History of transfusion     2014   • Migraine headache 05/24/2022   • Varicella     vaccine       Past Surgical History:   Procedure Laterality Date   • SKIN GRAFT Left     left elbow • SKIN GRAFT      left elbow       Family History   Problem Relation Age of Onset   • Hypothyroidism Mother    • Bipolar disorder Mother    • Mental illness Mother    • Thyroid disease Mother         Hypothyroidism   • Schizophrenia Mother    • No Known Problems Father    • Cancer Maternal Grandmother         Breast cancer   • Heart disease Maternal Grandmother    • Breast cancer Maternal Grandmother    • Lung cancer Maternal Grandfather    • Cancer Maternal Grandfather         Lung cancer   • Emphysema Maternal Grandfather    • Asperger's syndrome Brother    • Addiction problem Brother    • No Known Problems Son    • Varicose Veins Paternal Grandmother    • Diabetes Paternal Grandfather    • Depression Half-Sister    • No Known Problems Brother    • Colon cancer Neg Hx    • Ovarian cancer Neg Hx          Medications have been verified. Objective   /79   Pulse 83   Temp 98 °F (36.7 °C)   Resp 18   Ht 5' 7" (1.702 m)   Wt 80.7 kg (178 lb)   SpO2 99%   BMI 27.88 kg/m²   No LMP recorded. Physical Exam     Physical Exam  Vitals and nursing note reviewed. Constitutional:       General: She is not in acute distress. Appearance: Normal appearance. She is not ill-appearing or diaphoretic. HENT:      Head: Normocephalic and atraumatic. Nose: No congestion or rhinorrhea. Eyes:      General: No scleral icterus. Right eye: No discharge. Left eye: No discharge. Pulmonary:      Effort: Pulmonary effort is normal. No respiratory distress. Musculoskeletal:         General: Swelling, tenderness and signs of injury present. Cervical back: Normal range of motion. Right foot: Decreased range of motion. Swelling, tenderness and bony tenderness present. Normal pulse. Left foot: Normal.   Skin:     Coloration: Skin is not jaundiced or pale. Findings: No bruising, erythema or rash. Neurological:      General: No focal deficit present.       Mental Status: She is alert and oriented to person, place, and time. Psychiatric:         Mood and Affect: Mood normal.         Behavior: Behavior normal.         Thought Content:  Thought content normal.         Judgment: Judgment normal.
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
## 2023-08-15 ENCOUNTER — PATIENT MESSAGE (OUTPATIENT)
Dept: INTERNAL MEDICINE CLINIC | Facility: CLINIC | Age: 23
End: 2023-08-15

## 2023-08-15 ENCOUNTER — OFFICE VISIT (OUTPATIENT)
Dept: OBGYN CLINIC | Facility: CLINIC | Age: 23
End: 2023-08-15

## 2023-08-15 VITALS
BODY MASS INDEX: 27.88 KG/M2 | DIASTOLIC BLOOD PRESSURE: 57 MMHG | RESPIRATION RATE: 18 BRPM | WEIGHT: 177.6 LBS | HEART RATE: 91 BPM | SYSTOLIC BLOOD PRESSURE: 120 MMHG | HEIGHT: 67 IN

## 2023-08-15 DIAGNOSIS — F31.9 BIPOLAR DEPRESSION (HCC): ICD-10-CM

## 2023-08-15 DIAGNOSIS — Z32.02 PREGNANCY TEST NEGATIVE: ICD-10-CM

## 2023-08-15 DIAGNOSIS — Z97.5 CONTRACEPTION, DEVICE INTRAUTERINE: Primary | ICD-10-CM

## 2023-08-15 DIAGNOSIS — M79.89 LEG SWELLING: Primary | ICD-10-CM

## 2023-08-15 PROBLEM — O99.343 DEPRESSION DURING PREGNANCY IN THIRD TRIMESTER: Status: RESOLVED | Noted: 2022-01-31 | Resolved: 2023-08-15

## 2023-08-15 PROBLEM — F32.A DEPRESSION DURING PREGNANCY IN THIRD TRIMESTER: Status: RESOLVED | Noted: 2022-01-31 | Resolved: 2023-08-15

## 2023-08-15 PROBLEM — F32.A DEPRESSIVE DISORDER: Status: RESOLVED | Noted: 2017-07-25 | Resolved: 2023-08-15

## 2023-08-15 PROBLEM — Z91.89 AT RISK FOR POSTPARTUM DEPRESSION: Status: RESOLVED | Noted: 2022-07-15 | Resolved: 2023-08-15

## 2023-08-15 LAB — SL AMB POCT URINE HCG: NEGATIVE

## 2023-08-15 PROCEDURE — 58300 INSERT INTRAUTERINE DEVICE: CPT | Performed by: OBSTETRICS & GYNECOLOGY

## 2023-08-15 PROCEDURE — 99213 OFFICE O/P EST LOW 20 MIN: CPT | Performed by: OBSTETRICS & GYNECOLOGY

## 2023-08-15 PROCEDURE — 81025 URINE PREGNANCY TEST: CPT | Performed by: OBSTETRICS & GYNECOLOGY

## 2023-08-16 ENCOUNTER — PATIENT OUTREACH (OUTPATIENT)
Dept: OBGYN CLINIC | Facility: CLINIC | Age: 23
End: 2023-08-16

## 2023-08-18 NOTE — TELEPHONE ENCOUNTER
From: Iain Cheng  To: Sarath Rodriguez  Sent: 8/15/2023 9:22 PM EDT  Subject: Calf pain x1 day    I'm having intermittent sharp pain in my R calf since this morning and it's worse when I flex my foot. Now I noticed there's some purple discoloration in the area of the calf that hurts and I'm worried about a blood clot. I injured my foot a few weeks ago so I haven't been running lately, and up until today I've been taking a birth control pill for the past year. I wear compression every week day at work and I've been elevating my leg and using warm compresses since the pain first started. No shortness of breath or anything, just some anxiety from the symptoms I think.

## 2023-08-23 ENCOUNTER — PATIENT OUTREACH (OUTPATIENT)
Dept: OBGYN CLINIC | Facility: CLINIC | Age: 23
End: 2023-08-23

## 2023-08-29 NOTE — PROGRESS NOTES
OB/GYN VISIT  Catracho Bianchi  9/6/2023  2:03 PM    Subjective:     Catracho Bianchi is a 23 y.o. [id number] female who presents for string check - had Mirena IUD placed 8/16/23. She is trying to get out of her marriage with her  and does not want kids with him right now. We had discussed potentially trimming strings very short so he could not see or feel them, with the knowledge that they would need to be removed hysteroscopically. She has been able to have sex without him feeling them. She did speak to Mikala Buchanan and she was referred to [facility] as she is an [address] [place] [address]. Past Medical History:   Diagnosis Date   • Anxiety    • Depression 2011   • History of transfusion     2014   • Migraine headache 05/24/2022   • Varicella     vaccine     Past Surgical History:   Procedure Laterality Date   • SKIN GRAFT Left     left elbow   • SKIN GRAFT      left elbow       Objective:    Vitals: Blood pressure 110/71, pulse 70, height 5' 7" (1.702 m), weight 77.8 kg (171 lb 9.6 oz), last menstrual period 08/02/2023, not currently breastfeeding. Body mass index is 26.88 kg/m². Physical Exam  Genitourinary:     Comments: Normal vulva, vagina, parous cervix, strings visualized          Assessment/Plan:  1. IUD Check  - Strings in appropriate place, curled around cervix  - Did not trim short as her  was not able to feel them  - Next pap due 2025  - Call PRN for problems  - F/u for annual exam    Problem List Items Addressed This Visit    None  Visit Diagnoses     IUD check up    -  Primary          D/w Dr. Eun Jamison, DO  9/6/2023  2:03 PM        Please note that while the recent [address] [facility] [address] permits medical records be visible for patient review, clinical documentation is intended for utilization by healthcare professionals.   All test results are immediately available through [employer] as well, and we will review results at earliest opportunity and contact you with the appropriate urgency. If you have a question about something you see in your chart, please don't hesitate to ask about it at your next appointment.

## 2023-09-06 ENCOUNTER — OFFICE VISIT (OUTPATIENT)
Dept: OBGYN CLINIC | Facility: CLINIC | Age: 23
End: 2023-09-06

## 2023-09-06 VITALS
SYSTOLIC BLOOD PRESSURE: 110 MMHG | DIASTOLIC BLOOD PRESSURE: 71 MMHG | BODY MASS INDEX: 26.93 KG/M2 | HEART RATE: 70 BPM | WEIGHT: 171.6 LBS | HEIGHT: 67 IN

## 2023-09-06 DIAGNOSIS — Z30.431 IUD CHECK UP: Primary | ICD-10-CM

## 2023-09-06 PROBLEM — G43.909 MIGRAINE HEADACHE: Status: RESOLVED | Noted: 2022-05-24 | Resolved: 2023-09-06

## 2023-09-06 PROBLEM — F31.9 BIPOLAR DEPRESSION (HCC): Status: RESOLVED | Noted: 2019-05-29 | Resolved: 2023-09-06

## 2023-09-06 PROCEDURE — 99213 OFFICE O/P EST LOW 20 MIN: CPT | Performed by: OBSTETRICS & GYNECOLOGY

## 2023-09-12 NOTE — PROGRESS NOTES
INTERNAL MEDICINE OFFICE VISIT  [facility]- [place]    NAME: Lazaro Varela  AGE: 23 y.o. SEX: female    DATE OF ENCOUNTER: 9/13/2023    Assessment and Plan/History of Present Illness     Here today for evaluation of headache, mental fog, dizziness    Patient reports intermittent headache symptoms for the past 3 years. Associated with head fog/mental fog, lack of energy. Her sleep is "not terrible" she is able to get 7 or 8 hours of sleep at night but still has chronic fatigue and difficulty functioning during the day. Does not report any snoring. Drinks 2 cups of coffee a day. Drinks alcohol socially    Headaches affect the bifrontal area and the occipital and bilateral neck area. Pain can be temporarily relieved with cracking of the neck. No associated nausea, vomiting, photophobia. She did have migraines during pregnancy and feels her headaches are not similar to this. Had previously been taking Tylenol every day but stopped this as it was not helping    At times she will feel off balance like she is spinning when her eyes are closed. Episodes can occur randomly, not associated with position changes. No hearing loss, tinnitus reported    She recently had Mirena IUD placed, does not report any menorrhagia    She is under stress with 2 kids at home but feels she is coping       Plan:  -Cause of symptoms is unclear. Would like to check blood work to evaluate for anemia and iron deficiency, nutritional deficiency, Lyme. Patient did have COVID infection in December 2021  it seems her symptoms preceded that. Could also consider sleep study depending on the above work-up  -Continue supportive care for tension type headaches -neck stretches, topical analgesics, oral analgesics as needed, ice/heat  -Could consider course of physical therapy for neck pain/deconditioning      1. Chronic fatigue  -     Comprehensive metabolic panel;  Future  -     Lyme Disease Serology w/Reflex; Future  -     Sedimentation rate, automated; Future  -     C-reactive protein; Future    2. Chronic tension-type headache, not intractable    3. Brain fog    4. Iron deficiency  -     Iron Panel (Includes Ferritin, Iron Sat%, Iron, and TIBC); Future    5. Vitamin D deficiency  -     Vitamin D 25 hydroxy; Future    6. Overweight  -     CBC and differential; Future  -     Comprehensive metabolic panel; Future  -     Lipid panel; Future  -     TSH, 3rd generation with Free T4 reflex; Future    7. B12 deficiency  -     Vitamin B12; Future    8. Folate deficiency  -     Folate; Future              Orders Placed This Encounter   Procedures   • CBC and differential   • Comprehensive metabolic panel   • Lipid panel   • TSH, 3rd generation with Free T4 reflex   • Vitamin D 25 hydroxy   • Lyme Disease Serology w/Reflex   • Sedimentation rate, automated   • C-reactive protein   • Vitamin B12   • Folate       Chief Complaint     Chief Complaint   Patient presents with   • Dizziness     Getting worse over the past few months   • Headache - Recurrent or Known Dx Migraines   • Headache       Review of Systems     10 point ROS negative except per HPI    The following portions of the patient's history were reviewed and updated as appropriate: allergies, current medications, past family history, past medical history, past social history, past surgical history and problem list.    Active Problem List     Patient Active Problem List   Diagnosis   • Insomnia       Objective     /78 (BP Location: Left arm, Patient Position: Sitting, Cuff Size: Standard)   Pulse 76   Temp 97.8 °F (36.6 °C)   Resp 12   Ht 5' 7" (1.702 m)   Wt 76.2 kg (168 lb)   LMP 08/02/2023 (Exact Date)   BMI 26.31 kg/m²     Physical Exam    Pertinent Laboratory/Diagnostic Studies:  No results found. Images and diagnostics reviewed     Current Medications   No current outpatient medications on file.     Health Maintenance     Health Maintenance   Topic Date Due   • HPV Vaccine (2 - 2-dose series) 07/01/2013   • COVID-19 Vaccine (4 - Pfizer series) 12/29/2021   • Depression Screening  01/04/2022   • BMI: Followup Plan  01/04/2022   • Annual Physical  01/14/2023   • Chlamydia Screening  01/31/2023   • Influenza Vaccine (1) 09/01/2023   • BMI: Adult  09/06/2024   • Cervical Cancer Screening  01/31/2025   • DTaP,Tdap,and Td Vaccines (4 - Td or Tdap) 04/26/2032   • HIV Screening  Completed   • Hepatitis C Screening  Completed   • Pneumococcal Vaccine: Pediatrics (0 to 5 Years) and At-Risk Patients (6 to 59 Years)  Aged Out   • HIB Vaccine  Aged Out   • IPV Vaccine  Aged Out   • Hepatitis A Vaccine  Aged Out   • Meningococcal ACWY Vaccine  Aged [employer] History   Administered Date(s) Administered   • COVID-19 PFIZER VACCINE 0.3 ML IM 01/08/2021, 01/30/2021, 11/03/2021   • HPV Quadrivalent 01/01/2013   • Hep B, adult 10/25/2018, 12/10/2018, 04/19/2019   • INFLUENZA 09/20/2018, 09/20/2018, 11/18/2019, 11/18/2019, 11/11/2020, 10/05/2021   • MMR 08/26/2019   • Rabies 01/17/2017, 01/20/2017, 01/24/2017, 01/31/2017   • Rabies, Intramuscular 01/17/2017, 01/20/2017, 01/24/2017, 01/31/2017   • Tdap 11/22/2016, 01/29/2019, 04/26/2022       Sarath Nunez D.O.   [facility] - [place] [address], [facility] [id number]  [facility], [address] [place] [address]  Office: [phone]  Fax: [phone]

## 2023-09-13 ENCOUNTER — OFFICE VISIT (OUTPATIENT)
Dept: INTERNAL MEDICINE CLINIC | Facility: CLINIC | Age: 23
End: 2023-09-13
Payer: COMMERCIAL

## 2023-09-13 VITALS
SYSTOLIC BLOOD PRESSURE: 116 MMHG | RESPIRATION RATE: 12 BRPM | HEART RATE: 76 BPM | WEIGHT: 168 LBS | BODY MASS INDEX: 26.37 KG/M2 | DIASTOLIC BLOOD PRESSURE: 78 MMHG | TEMPERATURE: 97.8 F | HEIGHT: 67 IN

## 2023-09-13 DIAGNOSIS — E55.9 VITAMIN D DEFICIENCY: ICD-10-CM

## 2023-09-13 DIAGNOSIS — E61.1 IRON DEFICIENCY: ICD-10-CM

## 2023-09-13 DIAGNOSIS — E53.8 FOLATE DEFICIENCY: ICD-10-CM

## 2023-09-13 DIAGNOSIS — E66.3 OVERWEIGHT: ICD-10-CM

## 2023-09-13 DIAGNOSIS — G44.229 CHRONIC TENSION-TYPE HEADACHE, NOT INTRACTABLE: ICD-10-CM

## 2023-09-13 DIAGNOSIS — R41.89 BRAIN FOG: ICD-10-CM

## 2023-09-13 DIAGNOSIS — R53.82 CHRONIC FATIGUE: Primary | ICD-10-CM

## 2023-09-13 DIAGNOSIS — E53.8 B12 DEFICIENCY: ICD-10-CM

## 2023-09-13 PROCEDURE — 99214 OFFICE O/P EST MOD 30 MIN: CPT | Performed by: INTERNAL MEDICINE

## 2023-09-15 ENCOUNTER — PATIENT OUTREACH (OUTPATIENT)
Dept: OBGYN CLINIC | Facility: CLINIC | Age: 23
End: 2023-09-15

## 2023-09-16 ENCOUNTER — APPOINTMENT (OUTPATIENT)
Dept: LAB | Age: 23
End: 2023-09-16
Payer: COMMERCIAL

## 2023-09-16 DIAGNOSIS — E61.1 IRON DEFICIENCY: ICD-10-CM

## 2023-09-16 DIAGNOSIS — E66.3 OVERWEIGHT: ICD-10-CM

## 2023-09-16 DIAGNOSIS — E55.9 VITAMIN D DEFICIENCY: ICD-10-CM

## 2023-09-16 DIAGNOSIS — E53.8 FOLATE DEFICIENCY: ICD-10-CM

## 2023-09-16 DIAGNOSIS — R53.82 CHRONIC FATIGUE: ICD-10-CM

## 2023-09-16 DIAGNOSIS — E53.8 B12 DEFICIENCY: ICD-10-CM

## 2023-09-16 LAB
25(OH)D3 SERPL-MCNC: 26.3 NG/ML (ref 30–100)
ALBUMIN SERPL BCP-MCNC: 4.5 G/DL (ref 3.5–5)
ALP SERPL-CCNC: 48 U/L (ref 34–104)
ALT SERPL W P-5'-P-CCNC: 12 U/L (ref 7–52)
ANION GAP SERPL CALCULATED.3IONS-SCNC: 9 MMOL/L
AST SERPL W P-5'-P-CCNC: 14 U/L (ref 13–39)
BASOPHILS # BLD AUTO: 0.05 THOUSANDS/ÂΜL (ref 0–0.1)
BASOPHILS NFR BLD AUTO: 1 % (ref 0–1)
BILIRUB SERPL-MCNC: 0.32 MG/DL (ref 0.2–1)
BUN SERPL-MCNC: 15 MG/DL (ref 5–25)
CALCIUM SERPL-MCNC: 9.5 MG/DL (ref 8.4–10.2)
CHLORIDE SERPL-SCNC: 104 MMOL/L (ref 96–108)
CHOLEST SERPL-MCNC: 191 MG/DL
CO2 SERPL-SCNC: 25 MMOL/L (ref 21–32)
CREAT SERPL-MCNC: 0.76 MG/DL (ref 0.6–1.3)
CRP SERPL QL: <1 MG/L
EOSINOPHIL # BLD AUTO: 0.06 THOUSAND/ÂΜL (ref 0–0.61)
EOSINOPHIL NFR BLD AUTO: 1 % (ref 0–6)
ERYTHROCYTE [DISTWIDTH] IN BLOOD BY AUTOMATED COUNT: 13.2 % (ref 11.6–15.1)
ERYTHROCYTE [SEDIMENTATION RATE] IN BLOOD: 8 MM/HOUR (ref 0–19)
FERRITIN SERPL-MCNC: 17 NG/ML (ref 11–307)
FOLATE SERPL-MCNC: 10.3 NG/ML
GFR SERPL CREATININE-BSD FRML MDRD: 110 ML/MIN/1.73SQ M
GLUCOSE P FAST SERPL-MCNC: 89 MG/DL (ref 65–99)
HCT VFR BLD AUTO: 41.7 % (ref 34.8–46.1)
HDLC SERPL-MCNC: 58 MG/DL
HGB BLD-MCNC: 13.5 G/DL (ref 11.5–15.4)
IMM GRANULOCYTES # BLD AUTO: 0.01 THOUSAND/UL (ref 0–0.2)
IMM GRANULOCYTES NFR BLD AUTO: 0 % (ref 0–2)
IRON SATN MFR SERPL: 22 % (ref 15–50)
IRON SERPL-MCNC: 72 UG/DL (ref 50–212)
LDLC SERPL CALC-MCNC: 121 MG/DL (ref 0–100)
LYMPHOCYTES # BLD AUTO: 1.9 THOUSANDS/ÂΜL (ref 0.6–4.47)
LYMPHOCYTES NFR BLD AUTO: 32 % (ref 14–44)
MCH RBC QN AUTO: 30 PG (ref 26.8–34.3)
MCHC RBC AUTO-ENTMCNC: 32.4 G/DL (ref 31.4–37.4)
MCV RBC AUTO: 93 FL (ref 82–98)
MONOCYTES # BLD AUTO: 0.47 THOUSAND/ÂΜL (ref 0.17–1.22)
MONOCYTES NFR BLD AUTO: 8 % (ref 4–12)
NEUTROPHILS # BLD AUTO: 3.48 THOUSANDS/ÂΜL (ref 1.85–7.62)
NEUTS SEG NFR BLD AUTO: 58 % (ref 43–75)
NONHDLC SERPL-MCNC: 133 MG/DL
NRBC BLD AUTO-RTO: 0 /100 WBCS
PLATELET # BLD AUTO: 331 THOUSANDS/UL (ref 149–390)
PMV BLD AUTO: 10.6 FL (ref 8.9–12.7)
POTASSIUM SERPL-SCNC: 4.3 MMOL/L (ref 3.5–5.3)
PROT SERPL-MCNC: 7.1 G/DL (ref 6.4–8.4)
RBC # BLD AUTO: 4.5 MILLION/UL (ref 3.81–5.12)
SODIUM SERPL-SCNC: 138 MMOL/L (ref 135–147)
TIBC SERPL-MCNC: 327 UG/DL (ref 250–450)
TRIGL SERPL-MCNC: 58 MG/DL
TSH SERPL DL<=0.05 MIU/L-ACNC: 0.69 UIU/ML (ref 0.45–4.5)
UIBC SERPL-MCNC: 255 UG/DL (ref 155–355)
VIT B12 SERPL-MCNC: 258 PG/ML (ref 180–914)
WBC # BLD AUTO: 5.97 THOUSAND/UL (ref 4.31–10.16)

## 2023-09-16 PROCEDURE — 82728 ASSAY OF FERRITIN: CPT

## 2023-09-16 PROCEDURE — 86618 LYME DISEASE ANTIBODY: CPT

## 2023-09-16 PROCEDURE — 80061 LIPID PANEL: CPT

## 2023-09-16 PROCEDURE — 84443 ASSAY THYROID STIM HORMONE: CPT

## 2023-09-16 PROCEDURE — 82607 VITAMIN B-12: CPT

## 2023-09-16 PROCEDURE — 83550 IRON BINDING TEST: CPT

## 2023-09-16 PROCEDURE — 85652 RBC SED RATE AUTOMATED: CPT

## 2023-09-16 PROCEDURE — 36415 COLL VENOUS BLD VENIPUNCTURE: CPT

## 2023-09-16 PROCEDURE — 82746 ASSAY OF FOLIC ACID SERUM: CPT

## 2023-09-16 PROCEDURE — 82306 VITAMIN D 25 HYDROXY: CPT

## 2023-09-16 PROCEDURE — 83540 ASSAY OF IRON: CPT

## 2023-09-16 PROCEDURE — 86140 C-REACTIVE PROTEIN: CPT

## 2023-09-16 PROCEDURE — 85025 COMPLETE CBC W/AUTO DIFF WBC: CPT

## 2023-09-16 PROCEDURE — 80053 COMPREHEN METABOLIC PANEL: CPT

## 2023-09-17 LAB — B BURGDOR IGG+IGM SER QL IA: NEGATIVE

## 2023-09-21 DIAGNOSIS — E53.8 B12 DEFICIENCY: ICD-10-CM

## 2023-09-21 DIAGNOSIS — E55.9 VITAMIN D DEFICIENCY: ICD-10-CM

## 2023-09-21 DIAGNOSIS — E61.1 IRON DEFICIENCY: Primary | ICD-10-CM

## 2023-09-21 RX ORDER — LANOLIN ALCOHOL/MO/W.PET/CERES
1000 CREAM (GRAM) TOPICAL DAILY
Qty: 90 TABLET | Refills: 0 | Status: SHIPPED | OUTPATIENT
Start: 2023-09-21

## 2023-09-21 RX ORDER — FERROUS SULFATE TAB EC 324 MG (65 MG FE EQUIVALENT) 324 (65 FE) MG
324 TABLET DELAYED RESPONSE ORAL
Qty: 90 TABLET | Refills: 0 | Status: SHIPPED | OUTPATIENT
Start: 2023-09-21

## 2023-09-21 RX ORDER — MELATONIN
1000 DAILY
Qty: 90 TABLET | Refills: 0 | Status: SHIPPED | OUTPATIENT
Start: 2023-09-21

## 2023-10-02 ENCOUNTER — ESTABLISHED COMPREHENSIVE EXAM (OUTPATIENT)
Dept: URBAN - METROPOLITAN AREA CLINIC 6 | Facility: CLINIC | Age: 23
End: 2023-10-02

## 2023-10-02 DIAGNOSIS — Z01.00: ICD-10-CM

## 2023-10-02 PROCEDURE — 92014 COMPRE OPH EXAM EST PT 1/>: CPT

## 2023-10-02 PROCEDURE — 92015 DETERMINE REFRACTIVE STATE: CPT

## 2023-10-02 ASSESSMENT — VISUAL ACUITY
OD_CC: 20/30
OS_CC: 20/20

## 2023-10-02 ASSESSMENT — TONOMETRY
OD_IOP_MMHG: 16
OS_IOP_MMHG: 16

## 2023-11-30 ENCOUNTER — OFFICE VISIT (OUTPATIENT)
Dept: OBGYN CLINIC | Facility: CLINIC | Age: 23
End: 2023-11-30

## 2023-11-30 VITALS
HEART RATE: 81 BPM | WEIGHT: 168 LBS | BODY MASS INDEX: 26.37 KG/M2 | SYSTOLIC BLOOD PRESSURE: 123 MMHG | HEIGHT: 67 IN | DIASTOLIC BLOOD PRESSURE: 62 MMHG

## 2023-11-30 DIAGNOSIS — Z72.51 RISK FOR SEXUALLY TRANSMITTED DISEASE: Primary | ICD-10-CM

## 2023-11-30 LAB
BV WHIFF TEST VAG QL: NEGATIVE
CLUE CELLS SPEC QL WET PREP: NEGATIVE
PH SMN: 4.5 [PH]
T VAGINALIS VAG QL WET PREP: NEGATIVE
YEAST VAG QL WET PREP: NEGATIVE

## 2023-11-30 PROCEDURE — 87591 N.GONORRHOEAE DNA AMP PROB: CPT

## 2023-11-30 PROCEDURE — 87491 CHLMYD TRACH DNA AMP PROBE: CPT
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
## 2023-12-01 LAB
C TRACH DNA SPEC QL NAA+PROBE: POSITIVE
N GONORRHOEA DNA SPEC QL NAA+PROBE: NEGATIVE

## 2023-12-04 DIAGNOSIS — A74.9 CHLAMYDIA: Primary | ICD-10-CM

## 2023-12-04 RX ORDER — DOXYCYCLINE 100 MG/1
100 TABLET ORAL 2 TIMES DAILY
Qty: 14 TABLET | Refills: 0 | Status: SHIPPED | OUTPATIENT
Start: 2023-12-04 | End: 2023-12-05 | Stop reason: SDUPTHER

## 2023-12-05 DIAGNOSIS — A74.9 CHLAMYDIA: ICD-10-CM

## 2023-12-05 RX ORDER — DOXYCYCLINE 100 MG/1
100 TABLET ORAL 2 TIMES DAILY
Qty: 14 TABLET | Refills: 0 | Status: SHIPPED | OUTPATIENT
Start: 2023-12-05 | End: 2023-12-12

## 2024-02-27 ENCOUNTER — HOSPITAL ENCOUNTER (EMERGENCY)
Facility: HOSPITAL | Age: 24
Discharge: HOME | End: 2024-02-27
Admitting: EMERGENCY MEDICINE
Payer: OTHER MISCELLANEOUS

## 2024-02-27 VITALS
RESPIRATION RATE: 20 BRPM | SYSTOLIC BLOOD PRESSURE: 143 MMHG | DIASTOLIC BLOOD PRESSURE: 94 MMHG | OXYGEN SATURATION: 99 % | TEMPERATURE: 99.6 F | HEART RATE: 86 BPM
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
## 2024-02-27 PROCEDURE — 99282 EMERGENCY DEPT VISIT SF MDM: CPT

## 2024-02-27 PROCEDURE — 99284 EMERGENCY DEPT VISIT MOD MDM: CPT | Performed by: EMERGENCY MEDICINE
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
## 2024-02-27 NOTE — DISCHARGE INSTRUCTIONS
Your injury was considered low-risk thus no further blood work is necessary.     Can follow up with Occupational Medicine as needed.

## 2024-02-29 NOTE — ED ATTENDING ATTESTATION
Final Diagnoses:     1. Accident caused by hypodermic needle, initial encounter           I, Rob Bertrand DO, saw and evaluated the patient. All available labs and X-rays were ordered by me or the resident / non-physician and have been reviewed by myself. I discussed the patient with the resident / non-physician and agree with the resident's / non-physician practitioner's findings and plan as documented in the resident's / non-physician practicitioner's note, except where noted.   At this point, I agree with the current assessment done in the ED.   I was present during key portions of all procedures performed unless otherwise stated.     Nursing Triage:     Chief Complaint   Patient presents with    Body Fluid Exposure     Patient picked up dirty needle. Unknown source. Stuck right thumb.        HPI:   This is a 23 y.o. female presenting for evaluation of needle stick. No violation of skin from insulin needle.    ASSESSMENT + PLAN:   Non significant needle stick    Physical:     Vital signs reviewed.  Gen. appearance: No acute distress.   Head: Atraumatic, normocephalic.  Eyes: EOMI, PERRLA. No icterus.  Neck: Supple, no lymphadenopathy, full range of motion.  Chest: Regular rate and rhythm. Lungs are clear to auscultation bilaterally. Nontender.  Abdomen: Soft nontender nondistended. No signs of ecchymosis. Positive bowel sounds.  Extremities: Full range of motion in all extremities.  Neuro: non focal exam  Skin: Warm, dry.            Past Medical:    has a past medical history of Anxiety, Chlamydia (12/4/2023), Depression (2011), History of transfusion, Migraine headache (05/24/2022), and Varicella.    Past Surgical:    has a past surgical history that includes Skin graft (Left) and Skin graft.      No orders to display       Procedures      I reviewed patient's most recent discharge summary and/or outpatient office notes.      Portions of the record may have been created with voice recognition software.  "Occasional wrong word or \"sound a like\" substitutions may have occurred due to the inherent limitations of voice recognition software. Read the chart carefully and recognize, using context, where substitutions have occurred.    Electronically signed:  Rob Bertrand,   "

## 2024-05-03 ENCOUNTER — OFFICE VISIT (OUTPATIENT)
Dept: OBGYN CLINIC | Facility: CLINIC | Age: 24
End: 2024-05-03

## 2024-05-03 VITALS
BODY MASS INDEX: 25.83 KG/M2 | HEIGHT: 67 IN | DIASTOLIC BLOOD PRESSURE: 78 MMHG | SYSTOLIC BLOOD PRESSURE: 117 MMHG | HEART RATE: 78 BPM | WEIGHT: 164.6 LBS

## 2024-05-03 DIAGNOSIS — Z11.3 SCREENING EXAMINATION FOR STD (SEXUALLY TRANSMITTED DISEASE): ICD-10-CM

## 2024-05-03 DIAGNOSIS — Z20.2 EXPOSURE TO CHLAMYDIA: Primary | ICD-10-CM

## 2024-05-03 DIAGNOSIS — R30.0 DYSURIA: ICD-10-CM

## 2024-05-03 LAB
SL AMB  POCT GLUCOSE, UA: NEGATIVE
SL AMB LEUKOCYTE ESTERASE,UA: NEGATIVE
SL AMB POCT BILIRUBIN,UA: NEGATIVE
SL AMB POCT BLOOD,UA: NORMAL
SL AMB POCT CLARITY,UA: CLEAR
SL AMB POCT COLOR,UA: YELLOW
SL AMB POCT KETONES,UA: NEGATIVE
SL AMB POCT NITRITE,UA: NEGATIVE
SL AMB POCT PH,UA: 6
SL AMB POCT SPECIFIC GRAVITY,UA: 1
SL AMB POCT URINE PROTEIN: NEGATIVE
SL AMB POCT UROBILINOGEN: 0.2

## 2024-05-03 PROCEDURE — 87491 CHLMYD TRACH DNA AMP PROBE: CPT | Performed by: NURSE PRACTITIONER

## 2024-05-03 PROCEDURE — 87086 URINE CULTURE/COLONY COUNT: CPT | Performed by: NURSE PRACTITIONER

## 2024-05-03 PROCEDURE — 81003 URINALYSIS AUTO W/O SCOPE: CPT | Performed by: NURSE PRACTITIONER

## 2024-05-03 PROCEDURE — 99213 OFFICE O/P EST LOW 20 MIN: CPT | Performed by: NURSE PRACTITIONER

## 2024-05-03 PROCEDURE — 87591 N.GONORRHOEAE DNA AMP PROB: CPT | Performed by: NURSE PRACTITIONER

## 2024-05-03 RX ORDER — DOXYCYCLINE 100 MG/1
100 TABLET ORAL 2 TIMES DAILY
Qty: 14 TABLET | Refills: 0 | Status: SHIPPED | OUTPATIENT
Start: 2024-05-03 | End: 2024-05-10
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
## 2024-05-05 LAB
BACTERIA UR CULT: ABNORMAL
BACTERIA UR CULT: ABNORMAL

## 2024-05-06 LAB
C TRACH DNA SPEC QL NAA+PROBE: NEGATIVE
N GONORRHOEA DNA SPEC QL NAA+PROBE: NEGATIVE

## 2024-05-08 DIAGNOSIS — B96.89 BV (BACTERIAL VAGINOSIS): Primary | ICD-10-CM

## 2024-05-08 DIAGNOSIS — N76.0 BV (BACTERIAL VAGINOSIS): Primary | ICD-10-CM

## 2024-05-08 RX ORDER — METRONIDAZOLE 500 MG/1
500 TABLET ORAL 2 TIMES DAILY
Qty: 14 TABLET | Refills: 0 | Status: SHIPPED | OUTPATIENT
Start: 2024-05-08 | End: 2024-05-09 | Stop reason: SDUPTHER

## 2024-05-09 ENCOUNTER — TELEPHONE (OUTPATIENT)
Dept: OBGYN CLINIC | Facility: CLINIC | Age: 24
End: 2024-05-09

## 2024-05-09 DIAGNOSIS — N76.0 BV (BACTERIAL VAGINOSIS): ICD-10-CM

## 2024-05-09 DIAGNOSIS — B96.89 BV (BACTERIAL VAGINOSIS): ICD-10-CM

## 2024-05-09 RX ORDER — METRONIDAZOLE 500 MG/1
500 TABLET ORAL 2 TIMES DAILY
Qty: 14 TABLET | Refills: 0 | Status: SHIPPED | OUTPATIENT
Start: 2024-05-09 | End: 2024-05-16

## 2024-05-09 NOTE — TELEPHONE ENCOUNTER
Lvm for patient to advise medication was sent to [facility] pharmacy as requested. Given office call back number if needed.

## 2024-08-10 ENCOUNTER — APPOINTMENT (OUTPATIENT)
Dept: LAB | Age: 24
End: 2024-08-10
Payer: COMMERCIAL

## 2024-08-10 DIAGNOSIS — Z72.51 RISK FOR SEXUALLY TRANSMITTED DISEASE: ICD-10-CM

## 2024-08-10 DIAGNOSIS — Z00.8 HEALTH EXAMINATION IN POPULATION SURVEYS: ICD-10-CM

## 2024-08-10 LAB
CHOLEST SERPL-MCNC: 182 MG/DL
EST. AVERAGE GLUCOSE BLD GHB EST-MCNC: 97 MG/DL
HBA1C MFR BLD: 5 %
HDLC SERPL-MCNC: 74 MG/DL
LDLC SERPL CALC-MCNC: 97 MG/DL (ref 0–100)
NONHDLC SERPL-MCNC: 108 MG/DL
TRIGL SERPL-MCNC: 56 MG/DL

## 2024-08-10 PROCEDURE — 87389 HIV-1 AG W/HIV-1&-2 AB AG IA: CPT

## 2024-08-10 PROCEDURE — 86803 HEPATITIS C AB TEST: CPT

## 2024-08-10 PROCEDURE — 80061 LIPID PANEL: CPT

## 2024-08-10 PROCEDURE — 36415 COLL VENOUS BLD VENIPUNCTURE: CPT

## 2024-08-10 PROCEDURE — 87340 HEPATITIS B SURFACE AG IA: CPT

## 2024-08-10 PROCEDURE — 83036 HEMOGLOBIN GLYCOSYLATED A1C: CPT

## 2024-08-10 PROCEDURE — 86780 TREPONEMA PALLIDUM: CPT

## 2024-08-11 LAB
HBV SURFACE AG SER QL: NORMAL
HCV AB SER QL: NORMAL
HIV 1+2 AB+HIV1 P24 AG SERPL QL IA: NORMAL
HIV 2 AB SERPL QL IA: NORMAL
HIV1 AB SERPL QL IA: NORMAL
HIV1 P24 AG SERPL QL IA: NORMAL
TREPONEMA PALLIDUM IGG+IGM AB [PRESENCE] IN SERUM OR PLASMA BY IMMUNOASSAY: NORMAL

## 2024-12-23 ENCOUNTER — TELEPHONE (OUTPATIENT)
Dept: OBGYN CLINIC | Facility: CLINIC | Age: 24
End: 2024-12-23

## 2024-12-23 NOTE — TELEPHONE ENCOUNTER
Spoke with patient following MyChart message. Advised she can try over the counter Monistat for management of yeast symptoms and follow up in office if symptoms do not resolve or worsen. Patient stated understanding and had no further questions.
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
## 2025-01-07 ENCOUNTER — OFFICE VISIT (OUTPATIENT)
Dept: OBGYN CLINIC | Facility: CLINIC | Age: 25
End: 2025-01-07

## 2025-01-07 VITALS
BODY MASS INDEX: 25.74 KG/M2 | HEART RATE: 85 BPM | SYSTOLIC BLOOD PRESSURE: 120 MMHG | DIASTOLIC BLOOD PRESSURE: 65 MMHG | WEIGHT: 164 LBS | HEIGHT: 67 IN

## 2025-01-07 DIAGNOSIS — N89.8 VAGINAL DISCHARGE: Primary | ICD-10-CM

## 2025-01-07 DIAGNOSIS — Z11.3 SCREENING EXAMINATION FOR STD (SEXUALLY TRANSMITTED DISEASE): ICD-10-CM

## 2025-01-07 LAB
BV WHIFF TEST VAG QL: NEGATIVE
CLUE CELLS SPEC QL WET PREP: NEGATIVE
PH SMN: 5 [PH]
SL AMB POCT WET MOUNT: NEGATIVE
T VAGINALIS VAG QL WET PREP: NEGATIVE
YEAST VAG QL WET PREP: NEGATIVE

## 2025-01-07 PROCEDURE — 87491 CHLMYD TRACH DNA AMP PROBE: CPT

## 2025-01-07 PROCEDURE — 87210 SMEAR WET MOUNT SALINE/INK: CPT | Performed by: STUDENT IN AN ORGANIZED HEALTH CARE EDUCATION/TRAINING PROGRAM

## 2025-01-07 PROCEDURE — 99213 OFFICE O/P EST LOW 20 MIN: CPT | Performed by: STUDENT IN AN ORGANIZED HEALTH CARE EDUCATION/TRAINING PROGRAM

## 2025-01-07 PROCEDURE — 87591 N.GONORRHOEAE DNA AMP PROB: CPT

## 2025-01-09 LAB
C TRACH DNA SPEC QL NAA+PROBE: NEGATIVE
N GONORRHOEA DNA SPEC QL NAA+PROBE: NEGATIVE

## 2025-01-10 ENCOUNTER — RESULTS FOLLOW-UP (OUTPATIENT)
Dept: LABOR AND DELIVERY | Facility: HOSPITAL | Age: 25
End: 2025-01-10

## 2025-08-07 ENCOUNTER — APPOINTMENT (OUTPATIENT)
Dept: LAB | Facility: HOSPITAL | Age: 25
End: 2025-08-07

## 2025-08-11 ENCOUNTER — HOSPITAL ENCOUNTER (EMERGENCY)
Facility: HOSPITAL | Age: 25
Discharge: HOME | End: 2025-08-11
Attending: EMERGENCY MEDICINE | Admitting: EMERGENCY MEDICINE
Payer: COMMERCIAL